# Patient Record
Sex: FEMALE | Race: WHITE | Employment: OTHER | URBAN - METROPOLITAN AREA
[De-identification: names, ages, dates, MRNs, and addresses within clinical notes are randomized per-mention and may not be internally consistent; named-entity substitution may affect disease eponyms.]

---

## 2017-02-28 ENCOUNTER — HOSPITAL ENCOUNTER (OUTPATIENT)
Dept: MAMMOGRAPHY | Age: 67
Discharge: HOME | End: 2017-02-28
Attending: INTERNAL MEDICINE
Payer: MEDICARE

## 2017-02-28 DIAGNOSIS — Z78.0 POSTMENOPAUSAL: ICD-10-CM

## 2017-02-28 PROCEDURE — 77080 DXA BONE DENSITY AXIAL: CPT

## 2017-03-28 ENCOUNTER — HOSPITAL ENCOUNTER (OUTPATIENT)
Dept: PHYSICAL THERAPY | Age: 67
Discharge: HOME | End: 2017-03-28
Attending: INTERNAL MEDICINE
Payer: MEDICARE

## 2017-03-28 DIAGNOSIS — M54.41 ACUTE RIGHT-SIDED LOW BACK PAIN WITH RIGHT-SIDED SCIATICA: ICD-10-CM

## 2017-03-28 PROCEDURE — 97161 PT EVAL LOW COMPLEX 20 MIN: CPT

## 2017-03-28 PROCEDURE — 97110 THERAPEUTIC EXERCISES: CPT

## 2017-03-28 PROCEDURE — G8978 MOBILITY CURRENT STATUS: HCPCS

## 2017-03-28 PROCEDURE — G8979 MOBILITY GOAL STATUS: HCPCS

## 2017-03-28 NOTE — PROGRESS NOTES
Ambulatory/Rehab Services H2 Model Falls Risk Assessment    Risk Factor Pts. ·   Confusion/Disorientation/Impulsivity  []    4 ·   Symptomatic Depression  []   2 ·   Altered Elimination  []   1 ·   Dizziness/Vertigo  []   1 ·   Gender (Male)  []   1 ·   Any administered antiepileptics (anticonvulsants):  []   2 ·   Any administered benzodiazepines:  []   1 ·   Visual Impairment (specify):  []   1 ·   Portable Oxygen Use  []   1 ·   Orthostatic ? BP  []   1 ·   History of Recent Falls (within 3 mos.)  []   5     Ability to Rise from Chair (choose one) Pts. ·   Ability to rise in a single movement  []   0 ·   Pushes up, successful in one attempt  []   1 ·   Multiple attempts, but successful  []   3 ·   Unable to rise without assistance  []   4   Total: (5 or greater = High Risk) 0     Falls Prevention Plan:   []                Physical Limitations to Exercise (specify):   []                Mobility Assistance Device (type):   []                Exercise/Equipment Adaptation (specify):    ©2010 [facility] of [address] Patent #[id number].  Federal Law prohibits the replication, distribution or use without written permission from [facility] [employer]
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
## 2017-03-30 NOTE — PROGRESS NOTES
Received message to call Caltoshia Rolls her at 1:30pm.  She was feeling great after PT session and she believes she may have overdone it. She carried laundry up and down the stairs and woke up this morning and went to the bathroom very early and almost fell. She was able to hold onto the door frame to avoid a fall. I do see Kev Ta tomorrow and told her she is okay to continue the stretching (2) exercises (as long as no increase in pain). Planning on seeing her tomorrow for her appointment.

## 2017-03-31 ENCOUNTER — HOSPITAL ENCOUNTER (OUTPATIENT)
Dept: PHYSICAL THERAPY | Age: 67
Discharge: HOME | End: 2017-03-31
Attending: INTERNAL MEDICINE
Payer: MEDICARE

## 2017-03-31 PROCEDURE — 97140 MANUAL THERAPY 1/> REGIONS: CPT

## 2017-03-31 PROCEDURE — 97110 THERAPEUTIC EXERCISES: CPT
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
## 2017-04-03 ENCOUNTER — APPOINTMENT (OUTPATIENT)
Dept: PHYSICAL THERAPY | Age: 67
End: 2017-04-03
Attending: INTERNAL MEDICINE
Payer: MEDICARE

## 2017-04-04 ENCOUNTER — HOSPITAL ENCOUNTER (OUTPATIENT)
Dept: PHYSICAL THERAPY | Age: 67
Discharge: HOME | End: 2017-04-04
Attending: INTERNAL MEDICINE
Payer: MEDICARE

## 2017-04-06 ENCOUNTER — APPOINTMENT (OUTPATIENT)
Dept: PHYSICAL THERAPY | Age: 67
End: 2017-04-06
Attending: INTERNAL MEDICINE
Payer: MEDICARE

## 2017-04-07 ENCOUNTER — HOSPITAL ENCOUNTER (OUTPATIENT)
Dept: PHYSICAL THERAPY | Age: 67
Discharge: HOME | End: 2017-04-07
Attending: INTERNAL MEDICINE
Payer: MEDICARE

## 2017-04-07 PROCEDURE — 97140 MANUAL THERAPY 1/> REGIONS: CPT
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
## 2017-04-11 ENCOUNTER — HOSPITAL ENCOUNTER (OUTPATIENT)
Dept: PHYSICAL THERAPY | Age: 67
Discharge: HOME | End: 2017-04-11
Attending: INTERNAL MEDICINE
Payer: MEDICARE

## 2017-04-11 PROCEDURE — 97110 THERAPEUTIC EXERCISES: CPT

## 2017-04-11 PROCEDURE — 97140 MANUAL THERAPY 1/> REGIONS: CPT
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
## 2017-04-13 ENCOUNTER — HOSPITAL ENCOUNTER (OUTPATIENT)
Dept: PHYSICAL THERAPY | Age: 67
Discharge: HOME | End: 2017-04-13
Attending: INTERNAL MEDICINE
Payer: MEDICARE
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
## 2017-05-05 ENCOUNTER — HOSPITAL ENCOUNTER (OUTPATIENT)
Age: 67
Setting detail: OUTPATIENT SURGERY
Discharge: HOME | End: 2017-05-05
Attending: SURGERY | Admitting: SURGERY
Payer: MEDICARE

## 2017-05-05 ENCOUNTER — ANESTHESIA (OUTPATIENT)
Dept: ENDOSCOPY | Age: 67
End: 2017-05-05
Payer: MEDICARE

## 2017-05-05 ENCOUNTER — ANESTHESIA EVENT (OUTPATIENT)
Dept: ENDOSCOPY | Age: 67
End: 2017-05-05
Payer: MEDICARE

## 2017-05-05 VITALS
RESPIRATION RATE: 12 BRPM | SYSTOLIC BLOOD PRESSURE: 145 MMHG | TEMPERATURE: 98.6 F | DIASTOLIC BLOOD PRESSURE: 78 MMHG | OXYGEN SATURATION: 98 % | WEIGHT: 118 LBS | HEIGHT: 62 IN | BODY MASS INDEX: 21.71 KG/M2 | HEART RATE: 87 BPM

## 2017-05-05 LAB
GLUCOSE BLD STRIP.AUTO-MCNC: 229 MG/DL (ref 65–100)
GLUCOSE BLD STRIP.AUTO-MCNC: 275 MG/DL (ref 65–100)
GLUCOSE BLD STRIP.AUTO-MCNC: 313 MG/DL (ref 65–100)

## 2017-05-05 PROCEDURE — 82962 GLUCOSE BLOOD TEST: CPT

## 2017-05-05 PROCEDURE — 76060000032 HC ANESTHESIA 0.5 TO 1 HR: Performed by: SURGERY

## 2017-05-05 PROCEDURE — 74011250636 HC RX REV CODE- 250/636: Performed by: ANESTHESIOLOGY

## 2017-05-05 PROCEDURE — 74011250636 HC RX REV CODE- 250/636

## 2017-05-05 PROCEDURE — 76040000025: Performed by: SURGERY

## 2017-05-05 PROCEDURE — 74011636637 HC RX REV CODE- 636/637: Performed by: ANESTHESIOLOGY

## 2017-05-05 PROCEDURE — 74011000250 HC RX REV CODE- 250

## 2017-05-05 RX ORDER — PROPOFOL 10 MG/ML
INJECTION, EMULSION INTRAVENOUS
Status: DISCONTINUED | OUTPATIENT
Start: 2017-05-05 | End: 2017-05-05 | Stop reason: HOSPADM

## 2017-05-05 RX ORDER — SODIUM CHLORIDE, SODIUM LACTATE, POTASSIUM CHLORIDE, CALCIUM CHLORIDE 600; 310; 30; 20 MG/100ML; MG/100ML; MG/100ML; MG/100ML
100 INJECTION, SOLUTION INTRAVENOUS CONTINUOUS
Status: CANCELLED | OUTPATIENT
Start: 2017-05-05

## 2017-05-05 RX ORDER — SODIUM CHLORIDE 0.9 % (FLUSH) 0.9 %
5-10 SYRINGE (ML) INJECTION AS NEEDED
Status: CANCELLED | OUTPATIENT
Start: 2017-05-05

## 2017-05-05 RX ORDER — SODIUM CHLORIDE, SODIUM LACTATE, POTASSIUM CHLORIDE, CALCIUM CHLORIDE 600; 310; 30; 20 MG/100ML; MG/100ML; MG/100ML; MG/100ML
100 INJECTION, SOLUTION INTRAVENOUS CONTINUOUS
Status: DISCONTINUED | OUTPATIENT
Start: 2017-05-05 | End: 2017-05-05 | Stop reason: HOSPADM

## 2017-05-05 RX ORDER — LIDOCAINE HYDROCHLORIDE 20 MG/ML
INJECTION, SOLUTION EPIDURAL; INFILTRATION; INTRACAUDAL; PERINEURAL AS NEEDED
Status: DISCONTINUED | OUTPATIENT
Start: 2017-05-05 | End: 2017-05-05 | Stop reason: HOSPADM

## 2017-05-05 RX ORDER — INSULIN LISPRO 100 [IU]/ML
8 INJECTION, SOLUTION INTRAVENOUS; SUBCUTANEOUS ONCE
Status: COMPLETED | OUTPATIENT
Start: 2017-05-05 | End: 2017-05-05

## 2017-05-05 RX ADMIN — LIDOCAINE HYDROCHLORIDE 60 MG: 20 INJECTION, SOLUTION EPIDURAL; INFILTRATION; INTRACAUDAL; PERINEURAL at 11:06

## 2017-05-05 RX ADMIN — SODIUM CHLORIDE, SODIUM LACTATE, POTASSIUM CHLORIDE, AND CALCIUM CHLORIDE 100 ML/HR: 600; 310; 30; 20 INJECTION, SOLUTION INTRAVENOUS at 10:16

## 2017-05-05 RX ADMIN — PROPOFOL 160 MCG/KG/MIN: 10 INJECTION, EMULSION INTRAVENOUS at 11:06

## 2017-05-05 RX ADMIN — INSULIN LISPRO 8 UNITS: 100 INJECTION, SOLUTION INTRAVENOUS; SUBCUTANEOUS at 10:28

## 2017-05-05 NOTE — ANESTHESIA PREPROCEDURE EVALUATION
Anesthetic History               Review of Systems / Medical History  Patient summary reviewed, nursing notes reviewed and pertinent labs reviewed    Pulmonary        Sleep apnea: No treatment           Neuro/Psych              Cardiovascular    Hypertension              Exercise tolerance: >4 METS     GI/Hepatic/Renal                Endo/Other    Diabetes: poorly controlled, type 2  Hypothyroidism: well controlled       Other Findings   Comments: Sjogren's syndrome         Physical Exam    Airway  Mallampati: II  TM Distance: 4 - 6 cm  Neck ROM: decreased range of motion        Cardiovascular  Regular rate and rhythm,  S1 and S2 normal,  no murmur, click, rub, or gallop             Dental  No notable dental hx       Pulmonary  Breath sounds clear to auscultation               Abdominal  GI exam deferred       Other Findings            Anesthetic Plan    ASA: 3  Anesthesia type: total IV anesthesia            Anesthetic plan and risks discussed with: Patient and Spouse

## 2017-05-05 NOTE — ANESTHESIA POSTPROCEDURE EVALUATION
Post-Anesthesia Evaluation and Assessment    Patient: Ai Mckee MRN: [id number]  SSN: [id number]    YOB: 1950  Age: 78 y.o. Sex: female       Cardiovascular Function/Vital Signs  Visit Vitals    /83    Pulse 87    Temp 37 °C (98.6 °F)    Resp 12    Ht 5' 2\" (1.575 m)    Wt 53.5 kg (118 lb)    SpO2 98%    BMI 21.58 kg/m2       Patient is status post total IV anesthesia anesthesia for Procedure(s):  COLONOSCOPY/ 23.    Nausea/Vomiting: None    Postoperative hydration reviewed and adequate. Pain:  Pain Scale 1: Numeric (0 - 10) (05/05/17 1222)  Pain Intensity 1: 0 (05/05/17 1222)   Managed    Neurological Status: At baseline    Mental Status and Level of Consciousness: Arousable    Pulmonary Status:   O2 Device: Room air (05/05/17 1222)   Adequate oxygenation and airway patent    Complications related to anesthesia: None    Post-anesthesia assessment completed.  No concerns    Signed By: Roverto Cortez MD     May 5, 2017

## 2017-05-05 NOTE — IP AVS SNAPSHOT
Jeff Lopez 
 
 
 [address] [facility] [address] [facility] 
[phone] Patient: Darren Wallace MRN: [id number] LTR:9/58/3901 You are allergic to the following Allergen Reactions Clindamycin Anaphylaxis Amoxicillin Vertigo Cortisone Other (comments) Methylcellulose Other (comments) Vit E Acet-Min Oil-Dimethicone Other (comments) Pt reports no longer has issues with Araceli E. Recent Documentation Height Weight BMI OB Status Smoking Status 1.575 m 53.5 kg 21.58 kg/m2 Postmenopausal Never Smoker Emergency Contacts Name Discharge Info Relation Home Work Mobile Anamikakya Larry DISCHARGE CAREGIVER [3] Spouse [3] [phone] About your hospitalization You were admitted on: May 5, 2017 You last received care in the:  [facility] You were discharged on: May 5, 2017 Unit phone number:  [phone] Why you were hospitalized Your primary diagnosis was:  Not on File Providers Seen During Your Hospitalizations Provider Role Specialty Primary office phone Roxana Soto MD Attending Provider General Surgery [phone] Your Primary Care Physician (PCP) Primary Care Physician Office Phone Office Fax Abdoulaye Tim [phone] Follow-up Information None Your Appointments Wednesday May 17, 2017  3:15 PM EDT  
[facility] SCREENING with [facility] ROOM [address] [place] [address] [facility] ([address] [place] [address] Avenue) [address] [place] [address]  
[phone] ***** NOTE: Appointments for the Mobile Mammography UNIT CANNOT be made on My Chart *****  PATIENT ARRIVAL - Please report 30 minutes early to check in. GENERAL INSTRUCTIONS -  On the day of your exam do not use any bath powder, deodorant or lotions on the chest or armpit area.   Wear two-piece outfit for ease of changing. Allow at least 1 hour for test.  
  
    
  
  
 
  
  
  
Current Discharge Medication List  
  
ASK your doctor about these medications Dose & Instructions Dispensing Information Comments Morning Noon Evening Bedtime ACCU-CHEK LEANN PLUS TEST STRP strip Generic drug:  glucose blood VI test strips Your last dose was: Your next dose is:    
   
   
 Pt test 1 time daily DX: E11.9 Quantity:  100 Strip Refills:  3  
     
   
   
   
  
 acetaminophen 650 mg CR tablet Commonly known as:  TYLENOL Your last dose was: Your next dose is:    
   
   
 Dose:  650 mg Take 650 mg by mouth every six (6) hours as needed for Pain. Refills:  0 AMLODIPINE PO Your last dose was: Your next dose is: Take  by mouth. Refills:  0  
     
   
   
   
  
 ibuprofen 800 mg tablet Commonly known as:  MOTRIN Your last dose was: Your next dose is:    
   
   
 Dose:  800 mg Take 800 mg by mouth. Holding for colonoscopy Refills:  0  
     
   
   
   
  
 levothyroxine 100 mcg tablet Commonly known as:  SYNTHROID Your last dose was: Your next dose is:    
   
   
 Dose:  100 mcg Take 1 Tab by mouth Daily (before breakfast). Quantity:  90 Tab Refills:  1  
     
   
   
   
  
 metFORMIN 500 mg tablet Commonly known as:  GLUCOPHAGE Your last dose was: Your next dose is: TAKE 1 TABLET TWICE A DAY WITH MEALS Quantity:  180 Tab Refills:  1 OTHER Your last dose was: Your next dose is: Take  by mouth daily. Herbal supplement that she use to replace amlodipine  Indications: Bell Blood Pressure # 26 Refills:  0  
     
   
   
   
  
 promethazine 25 mg tablet Commonly known as:  PHENERGAN Your last dose was:     
   
Your next dose is:    
   
   
 Dose:  25 mg  
 Take 1 Tab by mouth every six (6) hours as needed for Nausea. Quantity:  30 Tab Refills:  2 tiZANidine 4 mg tablet Commonly known as:  Geovanny Tomas Your last dose was: Your next dose is:    
   
   
 Dose:  4 mg Take 1 Tab by mouth three (3) times daily. Quantity:  90 Tab Refills:  1 Discharge Instructions Gastrointestinal Colonoscopy/Flexible Sigmoidoscopy - Lower Exam Discharge Instructions 1. Call Dr. Siomara Sanon for any problems or questions. 2. Contact the doctors office for follow up appointment as directed 3. Medication may cause drowsiness for several hours, therefore, do not drive or operate machinery for remainder of the day. 4. No alcohol today. 5. Ordinarily, you may resume regular diet and activity after exam unless otherwise specified by your physician. 6. Because of air put into your colon during exam, you may experience some abdominal distension, relieved by the passage of gas, for several hours. 7. Contact your physician if you have any of the following: 
a. Excessive amount of bleeding  large amount when having a bowel movement. Occasional specks of blood with bowel movement would not be unusual. 
b. Severe abdominal pain 
c. Fever or Chills Any additional instructions:   
 
Repeat colonoscopy in 5 years Instructions given to Meera Hernandez and other family members. Discharge Orders None ACO Transitions of Care Introducing [employer] offers a voluntary care coordination program to provide high quality service and care to [facility] fee-for-service beneficiaries. Ara Gallegos was designed to help you enhance your health and well-being through the following services: ? Transitions of Care  support for individuals who are transitioning from one care setting to another (example: Hospital to home). ? Chronic and Complex Care Coordination  support for individuals and caregivers of those with serious or chronic illnesses or with more than one chronic (ongoing) condition and those who take a number of different medications. If you meet specific medical criteria, a [address] [place] [address] Rd may call you directly to coordinate your care with your primary care physician and your other care providers. For questions about the [facility] programs, please, contact your physicians office. For general questions or additional information about Accountable Care Organizations: 
Please visit www.medicare.gov/acos. html or call 1-800-MEDICARE (2-680.515.7243) TTY users should call 4-553.541.2591. Introducing \[facility] [place] [facility]\"" & HEALTH SERVICES! Dear Nataliia Mahoney: Thank you for requesting a [employer] account. Our records indicate that you already have an active [employer] account. You can access your account anytime at [email or web address] Did you know that you can access your hospital and ER discharge instructions at any time in [employer]? You can also review all of your test results from your hospital stay or ER visit. Additional Information If you have questions, please visit the Frequently Asked Questions section of the [employer] website at [email or web address] Remember, [employer] is NOT to be used for urgent needs. For medical emergencies, dial 911. Now available from your iPhone and Android! General Information Please provide this summary of care documentation to your next provider. Patient Signature:  ____________________________________________________________ Date:  ____________________________________________________________  
  
Maximus Hernández Provider Signature:  ____________________________________________________________ Date:  ____________________________________________________________

## 2017-05-05 NOTE — H&P
Colonoscopy History and Physical      Patient: Meaghan Abdullahi    Physician: Shakira Estrada MD    Referring Physician: No ref. provider found    Chief Complaint: For colonoscopy    History of Present Illness: Pt presents for colonoscopy. Family history of colon cancer in father, PGF. Has had prior exam in 2005, with 2 polyps, and (-) exam in 2011. History:  Past Medical History:   Diagnosis Date    Adverse effect of anesthesia     patient states she is slower to wake up than other and feels groggy longer    Diabetes ([address]     avfbs 225, last HA1C was 11.0, patient prefers to treat with herbal supplements    Hypertension     Sjogren's syndrome ([address]     dry eye and fatigue are her symptoms    Sleep apnea     patient does not use a c-pap, patient states she has eliminated this with meditation    Thyroid disease     hypothyroidism     Past Surgical History:   Procedure Laterality Date    HX LAP CHOLECYSTECTOMY  11/2007    HX TUBAL LIGATION  1976      Social History     Social History    Marital status:      Spouse name: N/A    Number of children: N/A    Years of education: N/A     Social History Main Topics    Smoking status: Never Smoker    Smokeless tobacco: Never Used    Alcohol use No    Drug use: No    Sexual activity: Yes     Partners: Male     Other Topics Concern    None     Social History Narrative      Family History   Problem Relation Age of Onset    Heart Disease Father     Stroke Father     Cancer Father      [place]    Heart Disease Brother     Diabetes Brother      DMII    Cancer Paternal Grandfather      [place]       Medications:   Prior to Admission medications    Medication Sig Start Date End Date Taking? Authorizing Provider   AMLODIPINE BESYLATE (AMLODIPINE PO) Take  by mouth. Yes Historical Provider   acetaminophen (TYLENOL) 650 mg CR tablet Take 650 mg by mouth every six (6) hours as needed for Pain.    Yes Historical Provider   promethazine (PHENERGAN) 25 mg tablet Take 1 Tab by mouth every six (6) hours as needed for Nausea. 3/11/16  Yes Vasyl Urban MD   metFORMIN (GLUCOPHAGE) 500 mg tablet TAKE 1 TABLET TWICE A DAY WITH MEALS  Patient taking differently: TAKE 1 TABLET TWICE A DAY WITH MEALS patient takes spiratically due to feet swelling 3/2/16  Yes Vasyl Urban MD   ibuprofen (MOTRIN) 800 mg tablet Take 800 mg by mouth. Holding for colonoscopy    Historical Provider   tiZANidine (ZANAFLEX) 4 mg tablet Take 1 Tab by mouth three (3) times daily. 4/5/17   Brandan Killian MD   levothyroxine (SYNTHROID) 100 mcg tablet Take 1 Tab by mouth Daily (before breakfast). 3/21/17   Brandan Killian MD   OTHER Take  by mouth daily. Herbal supplement that she use to replace amlodipine  Indications: Bell Blood Pressure # 26    Historical Provider   ACCU-CHEK LEANN PLUS TEST STRP strip Pt test 1 time daily DX: E11.9 3/11/16   Vasyl Urban MD       Allergies: Allergies   Allergen Reactions    Clindamycin Anaphylaxis    Amoxicillin Vertigo    Cortisone Other (comments)    Methylcellulose Other (comments)    Vit E Acet-Min Oil-Dimethicone Other (comments)     Pt reports no longer has issues with Araceli E. Physical Exam:     Vital Signs:   Visit Vitals    BP (!) 208/100    Pulse (!) 102    Temp 99.2 °F (37.3 °C)    Resp 16    Ht 5' 2\" (1.575 m)    Wt 118 lb (53.5 kg)    SpO2 96%    BMI 21.58 kg/m2     . General: in NAD      Heart: regular   Lungs: unlabored   Abdominal: soft   Neurological: grossly normal        Findings/Diagnosis: family history of colon cancer, personal hisstory of polyps    Plan of Care/Planned Procedure: Colonoscopy. Risks of endoscopy include  bleeding, perforation. They understand and agree to proceed.       Signed:  Martha Combs MD   5/5/2017

## 2017-05-05 NOTE — PROCEDURES
Procedure in Detail:  Informed consent was obtained for the procedure. The patient was placed in the left lateral decubitus position and sedation was induced by anesthesia. The [id number] was inserted into the rectum and advanced under direct vision to the cecum, which was identified by the ileocecal valve and appendiceal orifice. The quality of the colonic preparation was good. A careful inspection was made as the colonoscope was withdrawn, including a retroflexed view of the rectum; findings and interventions are described below. Appropriate photodocumentation was obtained. Findings:   ANUS: Anal exam reveals no masses or hemorrhoids, sphincter tone is normal.   RECTUM: Rectal exam reveals no masses or hemorrhoids. SIGMOID COLON: The mucosa is normal with good vascular pattern and without ulcers, diverticula, and polyps. DESCENDING COLON: The mucosa is normal with good vascular pattern and without ulcers, diverticula, and polyps. SPLENIC FLEXURE: The splenic flexure is normal.   TRANSVERSE COLON: The mucosa is normal with good vascular pattern and without ulcers, diverticula, and polyps. HEPATIC FLEXURE: The hepatic flexure is normal.   ASCENDING COLON: The mucosa is normal with good vascular pattern and without ulcers, diverticula, and polyps. CECUM: The appendiceal orifice appears normal. The ileocecal valve appears normal.   TERMINAL ILEUM: The terminal ileum was not entered. Specimens: No specimens were collected. Complications: None; patient tolerated the procedure well. \    EBL - none    Recommendations:   - Repeat colonoscopy in 5 years.      Signed By: Donn Tejada MD                        May 5, 2017

## 2017-05-05 NOTE — ROUTINE PROCESS
Vss. No complaints noted. Education given and reviewed with  who voiced understanding. Pt discharged via wheelchair by Nasrin Godfrey RN.

## 2017-05-05 NOTE — DISCHARGE INSTRUCTIONS
Gastrointestinal Colonoscopy/Flexible Sigmoidoscopy - Lower Exam Discharge Instructions  1. Call Dr. Job Brittle for any problems or questions. 2. Contact the doctors office for follow up appointment as directed  3. Medication may cause drowsiness for several hours, therefore, do not drive or operate machinery for remainder of the day. 4. No alcohol today. 5. Ordinarily, you may resume regular diet and activity after exam unless otherwise specified by your physician. 6. Because of air put into your colon during exam, you may experience some abdominal distension, relieved by the passage of gas, for several hours. 7. Contact your physician if you have any of the following:  a. Excessive amount of bleeding - large amount when having a bowel movement. Occasional specks of blood with bowel movement would not be unusual.  b. Severe abdominal pain  c. Fever or Chills  Any additional instructions:      Repeat colonoscopy in 5 years      Instructions given to Onelia Silva and other family members.

## 2019-08-17 ENCOUNTER — HOSPITAL ENCOUNTER (OUTPATIENT)
Dept: MAMMOGRAPHY | Age: 69
Discharge: HOME | End: 2019-08-17
Attending: FAMILY MEDICINE
Payer: MEDICARE

## 2019-08-17 DIAGNOSIS — Z12.31 ENCOUNTER FOR SCREENING MAMMOGRAM FOR BREAST CANCER: ICD-10-CM

## 2019-08-17 PROCEDURE — 77067 SCR MAMMO BI INCL CAD: CPT

## 2019-08-27 NOTE — PROGRESS NOTES
Your mammogram was normal.  You have dense breasts. This increases your risk of breast cancer. Recommend monthly self breast exams, yearly clinical breast exams, and yearly mammograms.   Cherise Arenas MD

## 2019-11-19 PROBLEM — H25.13 AGE-RELATED NUCLEAR CATARACT OF BOTH EYES: Status: ACTIVE | Noted: 2019-06-18

## 2019-11-19 PROBLEM — F32.9 MAJOR DEPRESSIVE DISORDER WITH SINGLE EPISODE: Status: ACTIVE | Noted: 2019-11-19

## 2019-11-19 PROBLEM — H04.123 DRY EYE SYNDROME, BILATERAL: Status: ACTIVE | Noted: 2019-06-18

## 2020-03-11 PROBLEM — F41.9 ANXIETY: Status: ACTIVE | Noted: 2020-03-11

## 2021-02-18 PROBLEM — Z78.9 STATIN INTOLERANCE: Status: ACTIVE | Noted: 2021-02-18

## 2021-02-18 PROBLEM — E78.5 HYPERLIPIDEMIA ASSOCIATED WITH TYPE 2 DIABETES MELLITUS (HCC): Status: ACTIVE | Noted: 2021-02-18

## 2021-02-18 PROBLEM — E11.69 HYPERLIPIDEMIA ASSOCIATED WITH TYPE 2 DIABETES MELLITUS (HCC): Status: ACTIVE | Noted: 2021-02-18

## 2021-10-30 ENCOUNTER — HOSPITAL ENCOUNTER (INPATIENT)
Age: 71
LOS: 3 days | Discharge: HOME | DRG: 243 | End: 2021-11-02
Attending: INTERNAL MEDICINE | Admitting: INTERNAL MEDICINE
Payer: MEDICARE

## 2021-10-30 ENCOUNTER — APPOINTMENT (OUTPATIENT)
Dept: GENERAL RADIOLOGY | Age: 71
DRG: 243 | End: 2021-10-30
Attending: NURSE PRACTITIONER
Payer: MEDICARE

## 2021-10-30 DIAGNOSIS — Z79.4 DIABETES MELLITUS DUE TO UNDERLYING CONDITION WITH HYPEROSMOLARITY WITHOUT COMA, WITH LONG-TERM CURRENT USE OF INSULIN (HCC): ICD-10-CM

## 2021-10-30 DIAGNOSIS — E08.00 DIABETES MELLITUS DUE TO UNDERLYING CONDITION WITH HYPEROSMOLARITY WITHOUT COMA, WITH LONG-TERM CURRENT USE OF INSULIN (HCC): ICD-10-CM

## 2021-10-30 DIAGNOSIS — H25.13 AGE-RELATED NUCLEAR CATARACT OF BOTH EYES: ICD-10-CM

## 2021-10-30 DIAGNOSIS — E78.5 HYPERLIPIDEMIA ASSOCIATED WITH TYPE 2 DIABETES MELLITUS (HCC): ICD-10-CM

## 2021-10-30 DIAGNOSIS — I10 PRIMARY HYPERTENSION: ICD-10-CM

## 2021-10-30 DIAGNOSIS — R00.1 BRADYCARDIA: ICD-10-CM

## 2021-10-30 DIAGNOSIS — R00.1 SYMPTOMATIC BRADYCARDIA: ICD-10-CM

## 2021-10-30 DIAGNOSIS — H04.123 DRY EYE SYNDROME, BILATERAL: ICD-10-CM

## 2021-10-30 DIAGNOSIS — F41.9 ANXIETY: ICD-10-CM

## 2021-10-30 DIAGNOSIS — E11.69 HYPERLIPIDEMIA ASSOCIATED WITH TYPE 2 DIABETES MELLITUS (HCC): ICD-10-CM

## 2021-10-30 LAB
ANION GAP SERPL CALC-SCNC: 10 MMOL/L (ref 7–16)
BUN SERPL-MCNC: 13 MG/DL (ref 8–23)
CALCIUM SERPL-MCNC: 8.1 MG/DL (ref 8.3–10.4)
CHLORIDE SERPL-SCNC: 105 MMOL/L (ref 98–107)
CO2 SERPL-SCNC: 21 MMOL/L (ref 21–32)
CREAT SERPL-MCNC: 0.77 MG/DL (ref 0.6–1)
GLUCOSE BLD STRIP.AUTO-MCNC: 315 MG/DL (ref 65–100)
GLUCOSE SERPL-MCNC: 253 MG/DL (ref 65–100)
MAGNESIUM SERPL-MCNC: 1.7 MG/DL (ref 1.8–2.4)
POTASSIUM SERPL-SCNC: 3.3 MMOL/L (ref 3.5–5.1)
SERVICE CMNT-IMP: ABNORMAL
SODIUM SERPL-SCNC: 136 MMOL/L (ref 136–145)
TSH SERPL DL<=0.005 MIU/L-ACNC: 0.15 UIU/ML (ref 0.36–3.74)

## 2021-10-30 PROCEDURE — 74011250636 HC RX REV CODE- 250/636: Performed by: INTERNAL MEDICINE

## 2021-10-30 PROCEDURE — 99223 1ST HOSP IP/OBS HIGH 75: CPT | Performed by: INTERNAL MEDICINE

## 2021-10-30 PROCEDURE — 36415 COLL VENOUS BLD VENIPUNCTURE: CPT

## 2021-10-30 PROCEDURE — 74011250636 HC RX REV CODE- 250/636: Performed by: NURSE PRACTITIONER

## 2021-10-30 PROCEDURE — 74011636637 HC RX REV CODE- 636/637: Performed by: NURSE PRACTITIONER

## 2021-10-30 PROCEDURE — 77030032994 HC SOL INJ SOD CL 0.9% LFCR 500ML

## 2021-10-30 PROCEDURE — 65620000000 HC RM CCU GENERAL

## 2021-10-30 PROCEDURE — 80048 BASIC METABOLIC PNL TOTAL CA: CPT

## 2021-10-30 PROCEDURE — 82962 GLUCOSE BLOOD TEST: CPT

## 2021-10-30 PROCEDURE — 2709999900 HC NON-CHARGEABLE SUPPLY

## 2021-10-30 PROCEDURE — 74011250637 HC RX REV CODE- 250/637: Performed by: INTERNAL MEDICINE

## 2021-10-30 PROCEDURE — 71045 X-RAY EXAM CHEST 1 VIEW: CPT

## 2021-10-30 PROCEDURE — 84443 ASSAY THYROID STIM HORMONE: CPT

## 2021-10-30 PROCEDURE — 65610000006 HC RM INTENSIVE CARE

## 2021-10-30 PROCEDURE — 74011250637 HC RX REV CODE- 250/637: Performed by: NURSE PRACTITIONER

## 2021-10-30 PROCEDURE — 83735 ASSAY OF MAGNESIUM: CPT

## 2021-10-30 RX ORDER — MAG HYDROX/ALUMINUM HYD/SIMETH 200-200-20
30 SUSPENSION, ORAL (FINAL DOSE FORM) ORAL
Status: DISCONTINUED | OUTPATIENT
Start: 2021-10-30 | End: 2021-11-02 | Stop reason: HOSPADM

## 2021-10-30 RX ORDER — LEVOTHYROXINE AND LIOTHYRONINE 38; 9 UG/1; UG/1
30 TABLET ORAL DAILY
Status: DISCONTINUED | OUTPATIENT
Start: 2021-10-31 | End: 2021-11-02 | Stop reason: HOSPADM

## 2021-10-30 RX ORDER — MONTELUKAST SODIUM 10 MG/1
10 TABLET ORAL
Status: DISCONTINUED | OUTPATIENT
Start: 2021-10-30 | End: 2021-11-02 | Stop reason: HOSPADM

## 2021-10-30 RX ORDER — SODIUM CHLORIDE 0.9 % (FLUSH) 0.9 %
5-40 SYRINGE (ML) INJECTION EVERY 8 HOURS
Status: DISCONTINUED | OUTPATIENT
Start: 2021-10-30 | End: 2021-11-02 | Stop reason: HOSPADM

## 2021-10-30 RX ORDER — LORAZEPAM 2 MG/ML
INJECTION INTRAMUSCULAR
Status: ACTIVE
Start: 2021-10-30 | End: 2021-10-31

## 2021-10-30 RX ORDER — ASCORBIC ACID 500 MG
500 TABLET ORAL DAILY
Status: DISCONTINUED | OUTPATIENT
Start: 2021-10-31 | End: 2021-11-02 | Stop reason: HOSPADM

## 2021-10-30 RX ORDER — HYDRALAZINE HYDROCHLORIDE 20 MG/ML
20 INJECTION INTRAMUSCULAR; INTRAVENOUS
Status: DISCONTINUED | OUTPATIENT
Start: 2021-10-30 | End: 2021-11-02 | Stop reason: HOSPADM

## 2021-10-30 RX ORDER — SODIUM CHLORIDE 0.9 % (FLUSH) 0.9 %
5-40 SYRINGE (ML) INJECTION AS NEEDED
Status: DISCONTINUED | OUTPATIENT
Start: 2021-10-30 | End: 2021-11-02 | Stop reason: HOSPADM

## 2021-10-30 RX ORDER — NALOXONE HYDROCHLORIDE 0.4 MG/ML
0.4 INJECTION, SOLUTION INTRAMUSCULAR; INTRAVENOUS; SUBCUTANEOUS AS NEEDED
Status: DISCONTINUED | OUTPATIENT
Start: 2021-10-30 | End: 2021-11-02 | Stop reason: HOSPADM

## 2021-10-30 RX ORDER — ACETAMINOPHEN 325 MG/1
650 TABLET ORAL
Status: DISCONTINUED | OUTPATIENT
Start: 2021-10-30 | End: 2021-11-02 | Stop reason: HOSPADM

## 2021-10-30 RX ORDER — ONDANSETRON 2 MG/ML
4 INJECTION INTRAMUSCULAR; INTRAVENOUS
Status: DISCONTINUED | OUTPATIENT
Start: 2021-10-30 | End: 2021-11-02 | Stop reason: HOSPADM

## 2021-10-30 RX ORDER — LORAZEPAM 2 MG/ML
0.5 INJECTION INTRAMUSCULAR ONCE
Status: COMPLETED | OUTPATIENT
Start: 2021-10-30 | End: 2021-10-30

## 2021-10-30 RX ORDER — DIPHENHYDRAMINE HCL 25 MG
25 CAPSULE ORAL
Status: DISCONTINUED | OUTPATIENT
Start: 2021-10-30 | End: 2021-11-02 | Stop reason: HOSPADM

## 2021-10-30 RX ORDER — INSULIN LISPRO 100 [IU]/ML
INJECTION, SOLUTION INTRAVENOUS; SUBCUTANEOUS
Status: DISCONTINUED | OUTPATIENT
Start: 2021-10-30 | End: 2021-11-02 | Stop reason: HOSPADM

## 2021-10-30 RX ORDER — LOSARTAN POTASSIUM 50 MG/1
25 TABLET ORAL DAILY
Status: DISCONTINUED | OUTPATIENT
Start: 2021-10-31 | End: 2021-11-01

## 2021-10-30 RX ORDER — MONTELUKAST SODIUM 4 MG/1
1 TABLET, CHEWABLE ORAL 2 TIMES DAILY
Status: DISCONTINUED | OUTPATIENT
Start: 2021-10-31 | End: 2021-11-01

## 2021-10-30 RX ORDER — DOCUSATE SODIUM 100 MG/1
100 CAPSULE, LIQUID FILLED ORAL
Status: DISCONTINUED | OUTPATIENT
Start: 2021-10-30 | End: 2021-11-02 | Stop reason: HOSPADM

## 2021-10-30 RX ORDER — ALOGLIPTIN 25 MG/1
25 TABLET, FILM COATED ORAL DAILY
Status: DISCONTINUED | OUTPATIENT
Start: 2021-10-31 | End: 2021-11-02 | Stop reason: HOSPADM

## 2021-10-30 RX ORDER — FLUTICASONE PROPIONATE 50 MCG
2 SPRAY, SUSPENSION (ML) NASAL DAILY
Status: DISCONTINUED | OUTPATIENT
Start: 2021-10-31 | End: 2021-11-02 | Stop reason: HOSPADM

## 2021-10-30 RX ORDER — EZETIMIBE 10 MG/1
10 TABLET ORAL DAILY
Status: DISCONTINUED | OUTPATIENT
Start: 2021-10-31 | End: 2021-11-02 | Stop reason: HOSPADM

## 2021-10-30 RX ADMIN — ACETAMINOPHEN 650 MG: 325 TABLET ORAL at 19:25

## 2021-10-30 RX ADMIN — Medication 10 ML: at 19:23

## 2021-10-30 RX ADMIN — MONTELUKAST 10 MG: 10 TABLET, FILM COATED ORAL at 19:52

## 2021-10-30 RX ADMIN — APIXABAN 5 MG: 5 TABLET, FILM COATED ORAL at 19:51

## 2021-10-30 RX ADMIN — SODIUM CHLORIDE 500 ML: 900 INJECTION, SOLUTION INTRAVENOUS at 20:01

## 2021-10-30 RX ADMIN — INSULIN LISPRO 8 UNITS: 100 INJECTION, SOLUTION INTRAVENOUS; SUBCUTANEOUS at 19:33

## 2021-10-30 RX ADMIN — Medication 1 EACH: at 20:10

## 2021-10-30 RX ADMIN — HYDRALAZINE HYDROCHLORIDE 20 MG: 20 INJECTION INTRAMUSCULAR; INTRAVENOUS at 19:26

## 2021-10-30 RX ADMIN — LORAZEPAM 0.5 MG: 2 INJECTION INTRAMUSCULAR; INTRAVENOUS at 20:09

## 2021-10-30 NOTE — Clinical Note
Sheath #2: Sheath: inserted. Sheath inserted/placed in the left subclavian  vein. Hemostasis achieved.

## 2021-10-30 NOTE — Clinical Note
TRANSFER - OUT REPORT:     Verbal report given to: CPRU. Report consisted of patient's Situation, Background, Assessment and   Recommendations(SBAR). Opportunity for questions and clarification was provided. Patient transported with a Registered Nurse and [address] [place] [address] / [facility] [employer].

## 2021-10-30 NOTE — Clinical Note
Right ventricle lead sutured in place using other (document in comment) sutures. Sheath pealed away prior to suturing.  Lead sutured down with O Ti-Cron

## 2021-10-30 NOTE — H&P
History of present illness:71 y.o. female presented to [facility] upon transfer from [facility] emergency department. Patient has a previous history of diabetes that is uncontrolled with last A1c greater than 9 additionally history of hypothyroidism on [place] Thyroid therapy. Patient presented to [facility] after a syncopal event earlier today. Patient states she was in her normal state of health lost consciousness awoke on the floor. She was taken to the [facility] emergency department at that time she was noted to be in atrial fibrillation which was a new diagnosis. Additionally heart rates in the 30 to 40 bpm range that were transient in nature and associated with emesis and brief loss of consciousness. At the time of exam today patient is awake and oriented with no complaints heart rate 90 bpm in atrial fibrillation. The patient is asymptomatic. Assessment  Atrial fibrillation new diagnosis patient with [id number] Score for Atrial Fibrillation Stroke Risk 4. Due to age and female gender history of hypertension and diabetes. Anticoagulation will be initiated with Eliquis dosing. See below regarding plans for device-based therapy  Rate control therapies will be indicated in the future now held due to symptomatic severe bradycardia associated with syncope      Bradycardia patient with episodes of symptomatic bradycardia off rate control medications suggesting underlying conduction disease. This was associated with a syncopal event and rates in the 30 to 40 bpm range. Rhythm strips reviewed. Tentative plan for dual-chamber pacemaker placement following echocardiogram to ensure patient does not have underlying left ventricular systolic dysfunction    Hypertension  Uncontrolled  Resume home therapy in the future. For now patient will be treated with IV hydralazine due to transiently elevated blood pressure readings.       Diabetes uncontrolled  Plan to initiate sliding scale insulin  Patient likely candidate for SGL 2 and GLP-1 therapy in the future    Hypothyroidism  Checking TSH T4 due to episodes of bradycardia      Review of Systems   Constitutional: Negative for fever. HENT: Negative for hearing loss. Respiratory: Negative for cough. Cardiovascular: Negative for chest pain. Gastrointestinal: Negative for heartburn. Genitourinary: Negative for dysuria. Musculoskeletal: Negative for myalgias. Skin: Negative for rash. Endo/Heme/Allergies: Does not bruise/bleed easily. Psychiatric/Behavioral: Negative for depression.      Past Medical History:   Diagnosis Date    Adverse effect of anesthesia     patient states she is slower to wake up than other and feels groggy longer    Anxiety     Depression     Diabetes ([address]     Hypercholesterolemia     Hypertension     IBS (irritable bowel syndrome)     Lumbar disc herniation     Sjogren's syndrome (HCC)     dry eye and fatigue are her symptoms    Sleep apnea     patient does not use a c-pap    TBI [facility]) [facility]     Thyroid disease     hypothyroidism     Past Surgical History:   Procedure Laterality Date    COLONOSCOPY N/A 5/5/2017    COLONOSCOPY/ 23 performed by Venice Amezquita MD at [facility] ENDOSCOPY    HX LAP CHOLECYSTECTOMY  11/2007    HX ORTHOPAEDIC      '18 laser treaatment lumber disc herniation    HX RETINAL DETACHMENT REPAIR Right 09/2021    HX TUBAL LIGATION  1976     Family History   Problem Relation Age of Onset    Heart Disease Father     Stroke Father     Cancer Father         Elma    Heart Disease Brother     Diabetes Brother         DMII    Cancer Paternal Grandfather         Elma    Cancer Paternal Aunt         ovarian    Breast Cancer Neg Hx      Social History     Socioeconomic History    Marital status:      Spouse name: Not on file    Number of children: Not on file    Years of education: Not on file    Highest education level: Not on file   Occupational History    Not on file   Tobacco Use    Smoking status: Never Smoker    Smokeless tobacco: Never Used   Vaping Use    Vaping Use: Never used   Substance and Sexual Activity    Alcohol use: No    Drug use: No    Sexual activity: Yes     Partners: Male   Other Topics Concern    Not on file   Social History Narrative    Not on file     Social Determinants of Health     Financial Resource Strain:     Difficulty of Paying Living Expenses:    Food Insecurity:     Worried About Running Out of Food in the Last Year:     [address] in the Last Year:    Transportation Needs:     Lack of Transportation (Medical):  Lack of Transportation (Non-Medical):    Physical Activity:     Days of Exercise per Week:     Minutes of Exercise per Session:    Stress:     Feeling of Stress :    Social Connections:     Frequency of Communication with Friends and Family:     Frequency of Social Gatherings with Friends and Family:     Attends Latter-day Services:     Active Member of Clubs or Organizations:     Attends Club or Organization Meetings:     Marital Status:    Intimate Partner Violence:     Fear of Current or Ex-Partner:     Emotionally Abused:     Physically Abused:     Sexually Abused:      No current facility-administered medications for this encounter. Visit Vitals  BP (!) 199/100   Pulse 98   Temp 98.9 °F (37.2 °C)   Resp 28   SpO2 97%     Physical Exam  Constitutional:       Appearance: She is well-developed. She is not diaphoretic. HENT:      Head: Normocephalic and atraumatic. Mouth/Throat:      Pharynx: No oropharyngeal exudate. Eyes:      General: No scleral icterus. Right eye: No discharge. Left eye: No discharge. Conjunctiva/sclera: Conjunctivae normal.      Pupils: Pupils are equal, round, and reactive to light. Neck:      Vascular: No JVD. Trachea: No tracheal deviation. Cardiovascular:      Rate and Rhythm: Normal rate. Heart sounds: Normal heart sounds. No murmur heard. Pulmonary:      Effort: Pulmonary effort is normal.      Breath sounds: No wheezing. Abdominal:      General: There is no distension. Palpations: There is no mass. Tenderness: There is no abdominal tenderness. There is no guarding or rebound. Musculoskeletal:         General: No tenderness. Normal range of motion. Cervical back: Normal range of motion. Skin:     General: Skin is warm and dry. Coloration: Skin is not pale. Findings: No rash. Neurological:      Mental Status: She is alert and oriented to person, place, and time. Cranial Nerves: No cranial nerve deficit. Psychiatric:         Behavior: Behavior normal.         Thought Content: Thought content normal.       No intake or output data in the 24 hours ending 10/30/21 1907  Lab Results   Component Value Date/Time    Sodium 139 10/18/2021 11:27 AM    Potassium 4.3 10/18/2021 11:27 AM    Chloride 100 10/18/2021 11:27 AM    CO2 24 10/18/2021 11:27 AM    Glucose 237 (H) 10/18/2021 11:27 AM    BUN 11 10/18/2021 11:27 AM    Creatinine 0.72 10/18/2021 11:27 AM    BUN/Creatinine ratio 15 10/18/2021 11:27 AM    GFR est AA 97 10/18/2021 11:27 AM    GFR est non-AA 84 10/18/2021 11:27 AM    Calcium 9.6 10/18/2021 11:27 AM     Lab Results   Component Value Date/Time    WBC 6.3 10/18/2021 11:27 AM    HGB 14.0 10/18/2021 11:27 AM    HCT 44.1 10/18/2021 11:27 AM    PLATELET 850 76/10/0784 11:27 AM    MCV 88 10/18/2021 11:27 AM     No results found for: CPK, RCK1, RCK2, RCK3, RCK4, CKMB, CKNDX, CKND1, TROPT, TROIQ, BNPP, BNP  Lab Results   Component Value Date/Time    Hemoglobin A1c 9.8 (H) 10/18/2021 11:27 AM         Assessment:    ICD-10-CM ICD-9-CM    1. Age-related nuclear cataract of both eyes  H25.13 366.16    2. Anxiety  F41.9 300.00    3.  Diabetes mellitus due to underlying condition with hyperosmolarity without coma, with long-term current use of insulin ([facility])  E08.00 249.20     Z79.4 V58.67    4. Dry eye syndrome, bilateral  H04. 123 375.15        Recommendations:

## 2021-10-30 NOTE — Clinical Note
Sheath #1: Sheath: inserted. Sheath inserted/placed in the left subclavian  vein. Hemostasis achieved.

## 2021-10-30 NOTE — Clinical Note
Right atrium lead screwed into place.  RAREFORM MRI - 45cm [FreeTextEntry1] : Patient is doing well, denies feeling tired, having cold intolerance, or palpitations. No dryness of the skin or hair loss. No chest pain or SOB. Taking the Levothyroxine regularly ½ hour before breakfast. Her weight has not changed.  The thyroid tests are within normal limits. The US thyroid is basically unchanged, she sees the Head and Neck surgeon Dr. Chaudhry. \par \par \par

## 2021-10-30 NOTE — Clinical Note
Right atrium lead sutured in place using other (document in comment) sutures. CapSureFix Novus MRI - 45cm/ sheath pealed away prior to suturing.  Sutured with O Ti-Cron

## 2021-10-30 NOTE — Clinical Note
TRANSFER - IN REPORT:     Verbal report received from: . Report consisted of patient's Situation, Background, Assessment and   Recommendations(SBAR). Opportunity for questions and clarification was provided. Assessment completed upon patient's arrival to unit and care assumed. Patient transported with a Registered Nurse and [address] [place] [address] / [facility] [employer].

## 2021-10-30 NOTE — Clinical Note
TRANSFER - OUT REPORT:     Verbal report given to: Kevin Whitlock . Report consisted of patient's Situation, Background, Assessment and   Recommendations(SBAR). Opportunity for questions and clarification was provided. Patient transported with a Registered Nurse. Patient transported to: [facility], 102. TRANSFER - OUT REPORT:    Medtronic dual chamber PPM with Dr. Maria Antonia Quinn  DDD 60:120    Primaseal & pressure dressing applied to Left chest.  No bleeding or hematoma, site soft    Left arm placed in sling       4 mg Versed  75 mcg Fentanyl  2 g Ancef     Verbal report given to Kevin Whitlock on Mohan Severino  being transferred to [facility] for routine progression of care       Report consisted of patient's Situation, Background, Assessment and   Recommendations(SBAR). Information from the following report(s) Procedure Summary and MAR was reviewed with the receiving nurse. Opportunity for questions and clarification was provided.       Patient transported with:   Registered Nurse

## 2021-10-31 ENCOUNTER — APPOINTMENT (OUTPATIENT)
Dept: NON INVASIVE DIAGNOSTICS | Age: 71
DRG: 243 | End: 2021-10-31
Attending: NURSE PRACTITIONER
Payer: MEDICARE

## 2021-10-31 LAB
ANION GAP SERPL CALC-SCNC: 7 MMOL/L (ref 7–16)
APPEARANCE UR: CLEAR
BACTERIA URNS QL MICRO: 0 /HPF
BILIRUB UR QL: NEGATIVE
BUN SERPL-MCNC: 13 MG/DL (ref 8–23)
CALCIUM SERPL-MCNC: 8.1 MG/DL (ref 8.3–10.4)
CASTS URNS QL MICRO: ABNORMAL /LPF
CHLORIDE SERPL-SCNC: 107 MMOL/L (ref 98–107)
CO2 SERPL-SCNC: 23 MMOL/L (ref 21–32)
COLOR UR: YELLOW
CREAT SERPL-MCNC: 0.6 MG/DL (ref 0.6–1)
ECHO AO ASC DIAM: 2.7 CM
ECHO AO ROOT DIAM: 2.7 CM
ECHO AV AREA PEAK VELOCITY: 2.05 CM2
ECHO AV AREA/BSA PEAK VELOCITY: 1.3 CM2/M2
ECHO AV PEAK GRADIENT: 12 MMHG
ECHO AV PEAK VELOCITY: 176 CM/S
ECHO EST RA PRESSURE: 3 MMHG
ECHO LA AREA 2C: 21.4 CM2
ECHO LA AREA 4C: 16.8 CM2
ECHO LA MAJOR AXIS: 5.99 CM
ECHO LA MINOR AXIS: 3.86 CM
ECHO LV E' LATERAL VELOCITY: 5.13 CM/S
ECHO LV E' SEPTAL VELOCITY: 5.22 CM/S
ECHO LV EDV A2C: 43.2 CM3
ECHO LV EDV A4C: 48.6 CM3
ECHO LV ESV A2C: 10.3 CM3
ECHO LV ESV A4C: 11.8 CM3
ECHO LV INTERNAL DIMENSION DIASTOLIC: 2.92 CM (ref 3.9–5.3)
ECHO LV INTERNAL DIMENSION SYSTOLIC: 2.19 CM
ECHO LV IVSD: 1.03 CM (ref 0.6–0.9)
ECHO LV MASS 2D: 85.1 G (ref 67–162)
ECHO LV MASS INDEX 2D: 54.9 G/M2 (ref 43–95)
ECHO LV POSTERIOR WALL DIASTOLIC: 1.07 CM (ref 0.6–0.9)
ECHO LVOT DIAM: 1.9 CM
ECHO LVOT PEAK GRADIENT: 6 MMHG
ECHO MV A VELOCITY: 135 CM/S
ECHO MV E DECELERATION TIME (DT): 190 MS
ECHO MV E VELOCITY: 102 CM/S
ECHO MV E/A RATIO: 0.76
ECHO MV E/E' LATERAL: 19.88
ECHO MV E/E' RATIO (AVERAGED): 19.71
ECHO MV E/E' SEPTAL: 19.54
ECHO RV INTERNAL DIMENSION: 2.53 CM
ECHO RV TAPSE: 1.95 CM (ref 1.5–2)
EPI CELLS #/AREA URNS HPF: ABNORMAL /HPF
ERYTHROCYTE [DISTWIDTH] IN BLOOD BY AUTOMATED COUNT: 11.6 % (ref 11.9–14.6)
GLUCOSE BLD STRIP.AUTO-MCNC: 193 MG/DL (ref 65–100)
GLUCOSE BLD STRIP.AUTO-MCNC: 213 MG/DL (ref 65–100)
GLUCOSE BLD STRIP.AUTO-MCNC: 224 MG/DL (ref 65–100)
GLUCOSE BLD STRIP.AUTO-MCNC: 243 MG/DL (ref 65–100)
GLUCOSE SERPL-MCNC: 185 MG/DL (ref 65–100)
GLUCOSE UR STRIP.AUTO-MCNC: 500 MG/DL
HCT VFR BLD AUTO: 36.1 % (ref 35.8–46.3)
HGB BLD-MCNC: 12 G/DL (ref 11.7–15.4)
HGB UR QL STRIP: ABNORMAL
KETONES UR QL STRIP.AUTO: >80 MG/DL
LEUKOCYTE ESTERASE UR QL STRIP.AUTO: NEGATIVE
MAGNESIUM SERPL-MCNC: 2.1 MG/DL (ref 1.8–2.4)
MCH RBC QN AUTO: 28.4 PG (ref 26.1–32.9)
MCHC RBC AUTO-ENTMCNC: 33.2 G/DL (ref 31.4–35)
MCV RBC AUTO: 85.3 FL (ref 79.6–97.8)
NITRITE UR QL STRIP.AUTO: NEGATIVE
NRBC # BLD: 0 K/UL (ref 0–0.2)
PH UR STRIP: 6.5 [PH] (ref 5–9)
PLATELET # BLD AUTO: 236 K/UL (ref 150–450)
PMV BLD AUTO: 10 FL (ref 9.4–12.3)
POTASSIUM SERPL-SCNC: 3.7 MMOL/L (ref 3.5–5.1)
PROT UR STRIP-MCNC: NEGATIVE MG/DL
RBC # BLD AUTO: 4.23 M/UL (ref 4.05–5.2)
RBC #/AREA URNS HPF: ABNORMAL /HPF
SERVICE CMNT-IMP: ABNORMAL
SODIUM SERPL-SCNC: 137 MMOL/L (ref 136–145)
SP GR UR REFRACTOMETRY: 1.02 (ref 1–1.02)
UROBILINOGEN UR QL STRIP.AUTO: 0.2 EU/DL (ref 0.2–1)
WBC # BLD AUTO: 8.3 K/UL (ref 4.3–11.1)
WBC URNS QL MICRO: ABNORMAL /HPF

## 2021-10-31 PROCEDURE — 93306 TTE W/DOPPLER COMPLETE: CPT

## 2021-10-31 PROCEDURE — 80048 BASIC METABOLIC PNL TOTAL CA: CPT

## 2021-10-31 PROCEDURE — 81001 URINALYSIS AUTO W/SCOPE: CPT

## 2021-10-31 PROCEDURE — 74011250637 HC RX REV CODE- 250/637: Performed by: INTERNAL MEDICINE

## 2021-10-31 PROCEDURE — 85027 COMPLETE CBC AUTOMATED: CPT

## 2021-10-31 PROCEDURE — 83735 ASSAY OF MAGNESIUM: CPT

## 2021-10-31 PROCEDURE — 74011250636 HC RX REV CODE- 250/636: Performed by: NURSE PRACTITIONER

## 2021-10-31 PROCEDURE — 74011250637 HC RX REV CODE- 250/637: Performed by: NURSE PRACTITIONER

## 2021-10-31 PROCEDURE — 2709999900 HC NON-CHARGEABLE SUPPLY

## 2021-10-31 PROCEDURE — 65660000004 HC RM CVT STEPDOWN

## 2021-10-31 PROCEDURE — 74011636637 HC RX REV CODE- 636/637: Performed by: NURSE PRACTITIONER

## 2021-10-31 PROCEDURE — 36415 COLL VENOUS BLD VENIPUNCTURE: CPT

## 2021-10-31 PROCEDURE — 99232 SBSQ HOSP IP/OBS MODERATE 35: CPT | Performed by: INTERNAL MEDICINE

## 2021-10-31 PROCEDURE — 82962 GLUCOSE BLOOD TEST: CPT

## 2021-10-31 RX ORDER — LANOLIN ALCOHOL/MO/W.PET/CERES
400 CREAM (GRAM) TOPICAL ONCE
Status: COMPLETED | OUTPATIENT
Start: 2021-10-31 | End: 2021-10-31

## 2021-10-31 RX ORDER — POTASSIUM CHLORIDE 20 MEQ/1
20 TABLET, EXTENDED RELEASE ORAL 2 TIMES DAILY
Status: COMPLETED | OUTPATIENT
Start: 2021-10-31 | End: 2021-10-31

## 2021-10-31 RX ORDER — POTASSIUM CHLORIDE 20 MEQ/1
40 TABLET, EXTENDED RELEASE ORAL
Status: COMPLETED | OUTPATIENT
Start: 2021-10-31 | End: 2021-10-31

## 2021-10-31 RX ADMIN — ACETAMINOPHEN 650 MG: 325 TABLET ORAL at 12:27

## 2021-10-31 RX ADMIN — ONDANSETRON 4 MG: 2 INJECTION INTRAMUSCULAR; INTRAVENOUS at 08:42

## 2021-10-31 RX ADMIN — INSULIN LISPRO 2 UNITS: 100 INJECTION, SOLUTION INTRAVENOUS; SUBCUTANEOUS at 05:56

## 2021-10-31 RX ADMIN — POTASSIUM CHLORIDE 20 MEQ: 20 TABLET, EXTENDED RELEASE ORAL at 17:37

## 2021-10-31 RX ADMIN — INSULIN LISPRO 4 UNITS: 100 INJECTION, SOLUTION INTRAVENOUS; SUBCUTANEOUS at 21:23

## 2021-10-31 RX ADMIN — POTASSIUM CHLORIDE 20 MEQ: 20 TABLET, EXTENDED RELEASE ORAL at 09:23

## 2021-10-31 RX ADMIN — LEVOTHYROXINE, LIOTHYRONINE 30 MG: 38; 9 TABLET ORAL at 09:23

## 2021-10-31 RX ADMIN — POTASSIUM CHLORIDE 40 MEQ: 20 TABLET, EXTENDED RELEASE ORAL at 00:45

## 2021-10-31 RX ADMIN — Medication 400 MG: at 00:45

## 2021-10-31 RX ADMIN — HYDRALAZINE HYDROCHLORIDE 20 MG: 20 INJECTION INTRAMUSCULAR; INTRAVENOUS at 05:41

## 2021-10-31 RX ADMIN — Medication 10 ML: at 21:18

## 2021-10-31 RX ADMIN — EZETIMIBE 10 MG: 10 TABLET ORAL at 09:23

## 2021-10-31 RX ADMIN — OXYCODONE HYDROCHLORIDE AND ACETAMINOPHEN 500 MG: 500 TABLET ORAL at 09:22

## 2021-10-31 RX ADMIN — ACETAMINOPHEN 650 MG: 325 TABLET ORAL at 23:24

## 2021-10-31 RX ADMIN — MONTELUKAST 10 MG: 10 TABLET, FILM COATED ORAL at 21:17

## 2021-10-31 RX ADMIN — HYDRALAZINE HYDROCHLORIDE 20 MG: 20 INJECTION INTRAMUSCULAR; INTRAVENOUS at 23:20

## 2021-10-31 RX ADMIN — Medication 10 ML: at 03:18

## 2021-10-31 RX ADMIN — ALOGLIPTIN 25 MG: 25 TABLET, FILM COATED ORAL at 09:23

## 2021-10-31 RX ADMIN — INSULIN LISPRO 4 UNITS: 100 INJECTION, SOLUTION INTRAVENOUS; SUBCUTANEOUS at 17:35

## 2021-10-31 RX ADMIN — LOSARTAN POTASSIUM 25 MG: 50 TABLET, FILM COATED ORAL at 09:23

## 2021-10-31 RX ADMIN — INSULIN LISPRO 4 UNITS: 100 INJECTION, SOLUTION INTRAVENOUS; SUBCUTANEOUS at 12:28

## 2021-10-31 RX ADMIN — FLUTICASONE PROPIONATE 2 SPRAY: 50 SPRAY, METERED NASAL at 09:23

## 2021-10-31 NOTE — PROGRESS NOTES
TRANSFER - IN REPORT:    Verbal report received from EMS(name) on Mike Mitchell  being received from [facility] (unit) for urgent transfer      Report consisted of patients Situation, Background, Assessment and   Recommendations(SBAR). Information from the following report(s) SBAR, ED Summary, Procedure Summary, MAR and Recent Results was reviewed with the receiving nurse. Screening Assessment for C Diff:     1. Three (3) or more diarrheal (liquid unformed) stools in less than 24 hours  no     2. If yes, has patient off laxatives for more than 24 hours? Not applicable     3. Was a stool specimen sent for C. Difficile toxin A and B? Not applicable     4. Was the patient placed on contact isolation? No    Opportunity for questions and clarification was provided. Assessment completed upon patients arrival to unit and care assumed. Patient's skin is intact with scarum benign.

## 2021-10-31 NOTE — PROGRESS NOTES
TRANSFER - IN REPORT:    Verbal report received from [address] (name) on Remington Lennox  being received from [facility] (unit) for routine progression of care      Report consisted of patients Situation, Background, Assessment and   Recommendations(SBAR). Information from the following report(s) SBAR, Kardex, Intake/Output, MAR and Cardiac Rhythm NSR was reviewed with the receiving nurse. Opportunity for questions and clarification was provided. Assessment completed upon patients arrival to unit and care assumed. Dual nurse skin assessment performed. No areas of breakdown noted.

## 2021-10-31 NOTE — PROGRESS NOTES
Spoke with NP with Cardiology, T. S., pt with prior to admit history of blurry vision, and dizziness. Pt is also scheduled for an outpatient MRI tomorrow, 11/1/21 by her PCP for the dizziness that pt has c/o. Pt aware that she might miss her MRI appt since she is in hospital. NP to discuss with MD in order to coordinate for patient's care.

## 2021-10-31 NOTE — PROGRESS NOTES
History of present illness:71 y.o. female anxiety worse this am No arrhythmia     Assessment    · Atrial fibrillation new diagnosis patient with [id number] Score for Atrial Fibrillation Stroke Risk 4.    · Hold Eliquis for device tonight. Bradycardia   · patient with episodes of symptomatic bradycardia off rate control medications suggesting underlying conduction disease. · This was associated with a syncopal event and rates in the 30 to 40 bpm range. · Echo today. · Class I indications for device therapy. Hypertension  · Uncontrolled  · Resumed home losartan     Diabetes uncontrolled  · sliding scale insulin  · Patient likely candidate for SGL 2 and GLP-1 therapy in the future     Hypothyroidism  · TSH suppressed. Review of Systems   Constitutional: Negative for fever. HENT: Negative for hearing loss. Respiratory: Negative for cough. Cardiovascular: Negative for chest pain. Gastrointestinal: Negative for heartburn. Genitourinary: Negative for dysuria. Musculoskeletal: Negative for myalgias. Skin: Negative for rash. Endo/Heme/Allergies: Does not bruise/bleed easily. Psychiatric/Behavioral: Negative for depression.      Past Medical History:   Diagnosis Date    Adverse effect of anesthesia     patient states she is slower to wake up than other and feels groggy longer    Anxiety     Depression     Diabetes ([address]     Hypercholesterolemia     Hypertension     IBS (irritable bowel syndrome)     Lumbar disc herniation     Sjogren's syndrome (HCC)     dry eye and fatigue are her symptoms    Sleep apnea     patient does not use a c-pap    TBI (traumatic brain injury) ([address]     Thyroid disease     hypothyroidism     Past Surgical History:   Procedure Laterality Date    COLONOSCOPY N/A 5/5/2017    COLONOSCOPY/ 23 performed by Ting Soto MD at [facility] ENDOSCOPY    HX LAP CHOLECYSTECTOMY  11/2007    HX ORTHOPAEDIC      '18 laser treaatment lumber disc herniation  HX RETINAL DETACHMENT REPAIR Right 09/2021    HX TUBAL LIGATION  1976     Family History   Problem Relation Age of Onset    Heart Disease Father     Stroke Father     Cancer Father         Godfrey    Heart Disease Brother     Diabetes Brother         DMII    Cancer Paternal Grandfather         Godfrey    Cancer Paternal Aunt         ovarian    Breast Cancer Neg Hx      Social History     Socioeconomic History    Marital status:      Spouse name: Not on file    Number of children: Not on file    Years of education: Not on file    Highest education level: Not on file   Occupational History    Not on file   Tobacco Use    Smoking status: Never Smoker    Smokeless tobacco: Never Used   Vaping Use    Vaping Use: Never used   Substance and Sexual Activity    Alcohol use: No    Drug use: No    Sexual activity: Yes     Partners: Male   Other Topics Concern    Not on file   Social History Narrative    Not on file     Social Determinants of Health     Financial Resource Strain:     Difficulty of Paying Living Expenses:    Food Insecurity:     Worried About Running Out of Food in the Last Year:     Ran Out of Food in the Last Year:    Transportation Needs:     Lack of Transportation (Medical):      Lack of Transportation (Non-Medical):    Physical Activity:     Days of Exercise per Week:     Minutes of Exercise per Session:    Stress:     Feeling of Stress :    Social Connections:     Frequency of Communication with Friends and Family:     Frequency of Social Gatherings with Friends and Family:     Attends Orthodox Services:     Active Member of Clubs or Organizations:     Attends Club or Organization Meetings:     Marital Status:    Intimate Partner Violence:     Fear of Current or Ex-Partner:     Emotionally Abused:     Physically Abused:     Sexually Abused:      Current Facility-Administered Medications   Medication Dose Route Frequency    potassium chloride (K-DUR, KLOR-CON) SR tablet 20 mEq  20 mEq Oral BID    ascorbic acid (vitamin C) (VITAMIN C) tablet 500 mg  500 mg Oral DAILY    colestipoL (COLESTID) tablet 1 g (Patient Supplied)  1 g Oral BID    ezetimibe (ZETIA) tablet 10 mg  10 mg Oral DAILY    fluticasone propionate (FLONASE) 50 mcg/actuation nasal spray 2 Spray  2 Spray Both Nostrils DAILY    alogliptin (NESINA) tablet 25 mg  25 mg Oral DAILY    losartan (COZAAR) tablet 25 mg  25 mg Oral DAILY    montelukast (SINGULAIR) tablet 10 mg  10 mg Oral QHS    thyroid (Pork) (ARMOUR) tablet 30 mg  30 mg Oral DAILY    sodium chloride (NS) flush 5-40 mL  5-40 mL IntraVENous Q8H    sodium chloride (NS) flush 5-40 mL  5-40 mL IntraVENous PRN    [Held by provider] apixaban (ELIQUIS) tablet 5 mg  5 mg Oral BID    acetaminophen (TYLENOL) tablet 650 mg  650 mg Oral Q4H PRN    naloxone (NARCAN) injection 0.4 mg  0.4 mg IntraVENous PRN    docusate sodium (COLACE) capsule 100 mg  100 mg Oral BID PRN    alum-mag hydroxide-simeth (MYLANTA) oral suspension 30 mL  30 mL Oral Q4H PRN    insulin lispro (HUMALOG) injection   SubCUTAneous AC&HS    alum-mag hydroxide-simeth (MYLANTA) oral suspension 30 mL  30 mL Oral Q4H PRN    diphenhydrAMINE (BENADRYL) capsule 25 mg  25 mg Oral Q6H PRN    ondansetron (ZOFRAN) injection 4 mg  4 mg IntraVENous Q4H PRN    hydrALAZINE (APRESOLINE) 20 mg/mL injection 20 mg  20 mg IntraVENous Q6H PRN    lip protectant (BLISTEX) ointment 1 Each  1 Each Topical PRN    LORazepam (ATIVAN) 2 mg/mL injection         Visit Vitals  BP (!) 140/65   Pulse 85   Temp 98.1 °F (36.7 °C)   Resp 19   Wt 128 lb 12 oz (58.4 kg)   SpO2 95%   BMI 24.94 kg/m²     Physical Exam  Constitutional:       Appearance: She is well-developed. She is not diaphoretic. HENT:      Head: Normocephalic and atraumatic. Mouth/Throat:      Pharynx: No oropharyngeal exudate. Eyes:      General: No scleral icterus. Right eye: No discharge. Left eye: No discharge. Conjunctiva/sclera: Conjunctivae normal.      Pupils: Pupils are equal, round, and reactive to light. Neck:      Vascular: No JVD. Trachea: No tracheal deviation. Cardiovascular:      Rate and Rhythm: Normal rate. Heart sounds: Normal heart sounds. No murmur heard. Pulmonary:      Effort: Pulmonary effort is normal.      Breath sounds: No wheezing. Abdominal:      General: There is no distension. Palpations: There is no mass. Tenderness: There is no abdominal tenderness. There is no guarding or rebound. Musculoskeletal:         General: No tenderness. Normal range of motion. Cervical back: Normal range of motion. Skin:     General: Skin is warm and dry. Coloration: Skin is not pale. Findings: No rash. Neurological:      Mental Status: She is alert and oriented to person, place, and time. Cranial Nerves: No cranial nerve deficit. Psychiatric:         Behavior: Behavior normal.         Thought Content:  Thought content normal.         Intake/Output Summary (Last 24 hours) at 10/31/2021 0717  Last data filed at 10/31/2021 0112  Gross per 24 hour   Intake 801.67 ml   Output 600 ml   Net 201.67 ml     Lab Results   Component Value Date/Time    Sodium 137 10/31/2021 03:17 AM    Potassium 3.7 10/31/2021 03:17 AM    Chloride 107 10/31/2021 03:17 AM    CO2 23 10/31/2021 03:17 AM    Anion gap 7 10/31/2021 03:17 AM    Glucose 185 (H) 10/31/2021 03:17 AM    BUN 13 10/31/2021 03:17 AM    Creatinine 0.60 10/31/2021 03:17 AM    BUN/Creatinine ratio 15 10/18/2021 11:27 AM    GFR est AA >60 10/31/2021 03:17 AM    GFR est non-AA >60 10/31/2021 03:17 AM    Calcium 8.1 (L) 10/31/2021 03:17 AM     Lab Results   Component Value Date/Time    WBC 8.3 10/31/2021 03:17 AM    HGB 12.0 10/31/2021 03:17 AM    HCT 36.1 10/31/2021 03:17 AM    PLATELET 544 21/20/9714 03:17 AM    MCV 85.3 10/31/2021 03:17 AM     No results found for: CPK, RCK1, RCK2, RCK3, RCK4, CKMB, CKNDX, CKND1, TROPT, TROIQ, BNPP, BNP  Lab Results   Component Value Date/Time    Hemoglobin A1c 9.8 (H) 10/18/2021 11:27 AM         Assessment:    ICD-10-CM ICD-9-CM    1. Age-related nuclear cataract of both eyes  H25.13 366.16    2. Anxiety  F41.9 300.00    3. Diabetes mellitus due to underlying condition with hyperosmolarity without coma, with long-term current use of insulin ([facility])  E08.00 249.20     Z79.4 V58.67    4. Dry eye syndrome, bilateral  H04. 123 375.15    5.  Symptomatic bradycardia  R00.1 427.89        Recommendations:

## 2021-10-31 NOTE — PROGRESS NOTES
TRANSFER - OUT REPORT:    Verbal report given to Aron Baltazar on [facility]  being transferred to [facility] [id number] [phone] for routine progression of care       Report consisted of patients Situation, Background, Assessment and Recommendations(SBAR). Information from the following report(s) SBAR, Procedure Summary, MAR, Recent Results and Cardiac Rhythm Sinus rhythm was reviewed with the receiving nurse. Opportunity for questions and clarification was provided.

## 2021-10-31 NOTE — PROGRESS NOTES
Dr. Elza Larkin in to see. Notified of last night's patient complaints of blurry vision, chest pain.

## 2021-11-01 ENCOUNTER — APPOINTMENT (OUTPATIENT)
Dept: GENERAL RADIOLOGY | Age: 71
DRG: 243 | End: 2021-11-01
Attending: INTERNAL MEDICINE
Payer: MEDICARE

## 2021-11-01 LAB
ANION GAP SERPL CALC-SCNC: 10 MMOL/L (ref 7–16)
ATRIAL RATE: 87 BPM
BUN SERPL-MCNC: 16 MG/DL (ref 8–23)
CALCIUM SERPL-MCNC: 8.8 MG/DL (ref 8.3–10.4)
CALCULATED P AXIS, ECG09: 49 DEGREES
CALCULATED R AXIS, ECG10: -6 DEGREES
CALCULATED T AXIS, ECG11: 45 DEGREES
CHLORIDE SERPL-SCNC: 104 MMOL/L (ref 98–107)
CO2 SERPL-SCNC: 20 MMOL/L (ref 21–32)
CREAT SERPL-MCNC: 0.79 MG/DL (ref 0.6–1)
DIAGNOSIS, 93000: NORMAL
ERYTHROCYTE [DISTWIDTH] IN BLOOD BY AUTOMATED COUNT: 11.9 % (ref 11.9–14.6)
GLUCOSE BLD STRIP.AUTO-MCNC: 175 MG/DL (ref 65–100)
GLUCOSE BLD STRIP.AUTO-MCNC: 193 MG/DL (ref 65–100)
GLUCOSE BLD STRIP.AUTO-MCNC: 206 MG/DL (ref 65–100)
GLUCOSE BLD STRIP.AUTO-MCNC: 214 MG/DL (ref 65–100)
GLUCOSE SERPL-MCNC: 203 MG/DL (ref 65–100)
HCT VFR BLD AUTO: 37.7 % (ref 35.8–46.3)
HGB BLD-MCNC: 12.6 G/DL (ref 11.7–15.4)
MAGNESIUM SERPL-MCNC: 2.1 MG/DL (ref 1.8–2.4)
MCH RBC QN AUTO: 28.8 PG (ref 26.1–32.9)
MCHC RBC AUTO-ENTMCNC: 33.4 G/DL (ref 31.4–35)
MCV RBC AUTO: 86.1 FL (ref 79.6–97.8)
NRBC # BLD: 0 K/UL (ref 0–0.2)
P-R INTERVAL, ECG05: 168 MS
PLATELET # BLD AUTO: 278 K/UL (ref 150–450)
PMV BLD AUTO: 10.3 FL (ref 9.4–12.3)
POTASSIUM SERPL-SCNC: 3.5 MMOL/L (ref 3.5–5.1)
Q-T INTERVAL, ECG07: 376 MS
QRS DURATION, ECG06: 68 MS
QTC CALCULATION (BEZET), ECG08: 452 MS
RBC # BLD AUTO: 4.38 M/UL (ref 4.05–5.2)
SERVICE CMNT-IMP: ABNORMAL
SODIUM SERPL-SCNC: 134 MMOL/L (ref 136–145)
VENTRICULAR RATE, ECG03: 87 BPM
WBC # BLD AUTO: 9.9 K/UL (ref 4.3–11.1)

## 2021-11-01 PROCEDURE — 74011000250 HC RX REV CODE- 250: Performed by: INTERNAL MEDICINE

## 2021-11-01 PROCEDURE — 77030041279 HC DRSG PRMSL AG MDII -B: Performed by: INTERNAL MEDICINE

## 2021-11-01 PROCEDURE — 0JH606Z INSERTION OF PACEMAKER, DUAL CHAMBER INTO CHEST SUBCUTANEOUS TISSUE AND FASCIA, OPEN APPROACH: ICD-10-PCS | Performed by: INTERNAL MEDICINE

## 2021-11-01 PROCEDURE — 74011250637 HC RX REV CODE- 250/637: Performed by: NURSE PRACTITIONER

## 2021-11-01 PROCEDURE — 33208 INSRT HEART PM ATRIAL & VENT: CPT | Performed by: INTERNAL MEDICINE

## 2021-11-01 PROCEDURE — 02H63JZ INSERTION OF PACEMAKER LEAD INTO RIGHT ATRIUM, PERCUTANEOUS APPROACH: ICD-10-PCS | Performed by: INTERNAL MEDICINE

## 2021-11-01 PROCEDURE — 71045 X-RAY EXAM CHEST 1 VIEW: CPT

## 2021-11-01 PROCEDURE — 77030031139 HC SUT VCRL2 J&J -A: Performed by: INTERNAL MEDICINE

## 2021-11-01 PROCEDURE — 74011636637 HC RX REV CODE- 636/637: Performed by: NURSE PRACTITIONER

## 2021-11-01 PROCEDURE — 74011250636 HC RX REV CODE- 250/636: Performed by: NURSE PRACTITIONER

## 2021-11-01 PROCEDURE — C1892 INTRO/SHEATH,FIXED,PEEL-AWAY: HCPCS | Performed by: INTERNAL MEDICINE

## 2021-11-01 PROCEDURE — 99152 MOD SED SAME PHYS/QHP 5/>YRS: CPT | Performed by: INTERNAL MEDICINE

## 2021-11-01 PROCEDURE — 77030003028 HC SUT VCRL J&J -A: Performed by: INTERNAL MEDICINE

## 2021-11-01 PROCEDURE — L3650 SO 8 ABD RESTRAINT PRE OTS: HCPCS | Performed by: INTERNAL MEDICINE

## 2021-11-01 PROCEDURE — C1785 PMKR, DUAL, RATE-RESP: HCPCS | Performed by: INTERNAL MEDICINE

## 2021-11-01 PROCEDURE — 77030018579 HC SUT TICRN1 COVD -B: Performed by: INTERNAL MEDICINE

## 2021-11-01 PROCEDURE — 99232 SBSQ HOSP IP/OBS MODERATE 35: CPT | Performed by: INTERNAL MEDICINE

## 2021-11-01 PROCEDURE — 2709999900 HC NON-CHARGEABLE SUPPLY

## 2021-11-01 PROCEDURE — 83735 ASSAY OF MAGNESIUM: CPT

## 2021-11-01 PROCEDURE — C1898 LEAD, PMKR, OTHER THAN TRANS: HCPCS | Performed by: INTERNAL MEDICINE

## 2021-11-01 PROCEDURE — 77030011747 HC SEAL HEMSTAT TELE -C: Performed by: INTERNAL MEDICINE

## 2021-11-01 PROCEDURE — 74011250636 HC RX REV CODE- 250/636: Performed by: INTERNAL MEDICINE

## 2021-11-01 PROCEDURE — 65660000004 HC RM CVT STEPDOWN

## 2021-11-01 PROCEDURE — 80048 BASIC METABOLIC PNL TOTAL CA: CPT

## 2021-11-01 PROCEDURE — 36415 COLL VENOUS BLD VENIPUNCTURE: CPT

## 2021-11-01 PROCEDURE — 82962 GLUCOSE BLOOD TEST: CPT

## 2021-11-01 PROCEDURE — 77030010507 HC ADH SKN DERMBND J&J -B: Performed by: INTERNAL MEDICINE

## 2021-11-01 PROCEDURE — 77030013504 HC DEV ELECSURG MEDT -F: Performed by: INTERNAL MEDICINE

## 2021-11-01 PROCEDURE — 93005 ELECTROCARDIOGRAM TRACING: CPT | Performed by: INTERNAL MEDICINE

## 2021-11-01 PROCEDURE — 85027 COMPLETE CBC AUTOMATED: CPT

## 2021-11-01 PROCEDURE — 02HK3JZ INSERTION OF PACEMAKER LEAD INTO RIGHT VENTRICLE, PERCUTANEOUS APPROACH: ICD-10-PCS | Performed by: INTERNAL MEDICINE

## 2021-11-01 PROCEDURE — 99153 MOD SED SAME PHYS/QHP EA: CPT | Performed by: INTERNAL MEDICINE

## 2021-11-01 DEVICE — LEAD 5076-52 MRI US RCMCRD
Type: IMPLANTABLE DEVICE | Status: FUNCTIONAL
Brand: CAPSUREFIX NOVUS MRI™ SURESCAN®

## 2021-11-01 DEVICE — LEAD 5076-45 MRI US RCMCRD
Type: IMPLANTABLE DEVICE | Status: FUNCTIONAL
Brand: CAPSUREFIX NOVUS MRI™ SURESCAN®

## 2021-11-01 DEVICE — IPG W1DR01 AZURE XT DR MRI USA
Type: IMPLANTABLE DEVICE | Status: FUNCTIONAL
Brand: AZURE™ XT DR MRI SURESCAN™

## 2021-11-01 RX ORDER — LIDOCAINE HYDROCHLORIDE 10 MG/ML
INJECTION INFILTRATION; PERINEURAL AS NEEDED
Status: DISCONTINUED | OUTPATIENT
Start: 2021-11-01 | End: 2021-11-01 | Stop reason: HOSPADM

## 2021-11-01 RX ORDER — LOSARTAN POTASSIUM 50 MG/1
25 TABLET ORAL ONCE
Status: COMPLETED | OUTPATIENT
Start: 2021-11-01 | End: 2021-11-01

## 2021-11-01 RX ORDER — SODIUM CHLORIDE 0.9 % (FLUSH) 0.9 %
5-40 SYRINGE (ML) INJECTION EVERY 8 HOURS
Status: DISCONTINUED | OUTPATIENT
Start: 2021-11-01 | End: 2021-11-02 | Stop reason: HOSPADM

## 2021-11-01 RX ORDER — INSULIN GLARGINE 100 [IU]/ML
5 INJECTION, SOLUTION SUBCUTANEOUS DAILY
Status: DISCONTINUED | OUTPATIENT
Start: 2021-11-01 | End: 2021-11-02 | Stop reason: HOSPADM

## 2021-11-01 RX ORDER — CEFAZOLIN SODIUM/WATER 2 G/20 ML
SYRINGE (ML) INTRAVENOUS
Status: COMPLETED | OUTPATIENT
Start: 2021-11-01 | End: 2021-11-01

## 2021-11-01 RX ORDER — CEFAZOLIN SODIUM/WATER 2 G/20 ML
2 SYRINGE (ML) INTRAVENOUS EVERY 6 HOURS
Status: DISCONTINUED | OUTPATIENT
Start: 2021-11-01 | End: 2021-11-02 | Stop reason: HOSPADM

## 2021-11-01 RX ORDER — SODIUM CHLORIDE 0.9 % (FLUSH) 0.9 %
5-40 SYRINGE (ML) INJECTION AS NEEDED
Status: DISCONTINUED | OUTPATIENT
Start: 2021-11-01 | End: 2021-11-02 | Stop reason: HOSPADM

## 2021-11-01 RX ORDER — LOSARTAN POTASSIUM 50 MG/1
50 TABLET ORAL DAILY
Status: DISCONTINUED | OUTPATIENT
Start: 2021-11-02 | End: 2021-11-02

## 2021-11-01 RX ORDER — MONTELUKAST SODIUM 4 MG/1
1 TABLET, CHEWABLE ORAL AS NEEDED
Status: DISCONTINUED | OUTPATIENT
Start: 2021-11-01 | End: 2021-11-02 | Stop reason: HOSPADM

## 2021-11-01 RX ORDER — FENTANYL CITRATE 50 UG/ML
INJECTION, SOLUTION INTRAMUSCULAR; INTRAVENOUS AS NEEDED
Status: DISCONTINUED | OUTPATIENT
Start: 2021-11-01 | End: 2021-11-01 | Stop reason: HOSPADM

## 2021-11-01 RX ORDER — MIDAZOLAM HYDROCHLORIDE 1 MG/ML
INJECTION, SOLUTION INTRAMUSCULAR; INTRAVENOUS AS NEEDED
Status: DISCONTINUED | OUTPATIENT
Start: 2021-11-01 | End: 2021-11-01 | Stop reason: HOSPADM

## 2021-11-01 RX ADMIN — LOSARTAN POTASSIUM 25 MG: 50 TABLET, FILM COATED ORAL at 08:14

## 2021-11-01 RX ADMIN — EZETIMIBE 10 MG: 10 TABLET ORAL at 08:14

## 2021-11-01 RX ADMIN — Medication 10 ML: at 16:11

## 2021-11-01 RX ADMIN — LEVOTHYROXINE, LIOTHYRONINE 30 MG: 38; 9 TABLET ORAL at 06:34

## 2021-11-01 RX ADMIN — INSULIN LISPRO 4 UNITS: 100 INJECTION, SOLUTION INTRAVENOUS; SUBCUTANEOUS at 21:28

## 2021-11-01 RX ADMIN — OXYCODONE HYDROCHLORIDE AND ACETAMINOPHEN 500 MG: 500 TABLET ORAL at 08:14

## 2021-11-01 RX ADMIN — LOSARTAN POTASSIUM 25 MG: 50 TABLET, FILM COATED ORAL at 16:08

## 2021-11-01 RX ADMIN — Medication 10 ML: at 21:29

## 2021-11-01 RX ADMIN — ACETAMINOPHEN 650 MG: 325 TABLET ORAL at 18:27

## 2021-11-01 RX ADMIN — INSULIN LISPRO 2 UNITS: 100 INJECTION, SOLUTION INTRAVENOUS; SUBCUTANEOUS at 16:08

## 2021-11-01 RX ADMIN — CEFAZOLIN 2 G: 10 INJECTION, POWDER, FOR SOLUTION INTRAVENOUS at 21:28

## 2021-11-01 RX ADMIN — Medication 10 ML: at 06:34

## 2021-11-01 RX ADMIN — Medication 10 ML: at 16:12

## 2021-11-01 RX ADMIN — INSULIN GLARGINE 5 UNITS: 100 INJECTION, SOLUTION SUBCUTANEOUS at 08:25

## 2021-11-01 RX ADMIN — ALOGLIPTIN 25 MG: 25 TABLET, FILM COATED ORAL at 08:14

## 2021-11-01 RX ADMIN — INSULIN LISPRO 4 UNITS: 100 INJECTION, SOLUTION INTRAVENOUS; SUBCUTANEOUS at 08:26

## 2021-11-01 RX ADMIN — MONTELUKAST 10 MG: 10 TABLET, FILM COATED ORAL at 21:28

## 2021-11-01 RX ADMIN — FLUTICASONE PROPIONATE 2 SPRAY: 50 SPRAY, METERED NASAL at 08:27

## 2021-11-01 RX ADMIN — HYDRALAZINE HYDROCHLORIDE 20 MG: 20 INJECTION INTRAMUSCULAR; INTRAVENOUS at 23:29

## 2021-11-01 NOTE — PROGRESS NOTES
Reviewed notes for concerns      Per notes:      Patient progressing in her care goals        Will continue to assess how to best serve this family

## 2021-11-01 NOTE — PROGRESS NOTES
[facility] CARDIOLOGY PROGRESS NOTE    11/1/2021 7:31 AM    Admit Date: 10/30/2021        Subjective:   Stable overnight without angina, CHF, or palpitations. Vitals stable and controlled. No other complaints overnight. Tolerating meds well. No pathologic bradycardia overnight but symptomatic bradycardia in the absence of any rate slowing meds as noted in history and physical. Discussed benefits and risks of dual-chamber pacemaker implantation, plan to proceed today. Objective:      Vitals:    10/31/21 2307 10/31/21 2345 11/01/21 0259 11/01/21 0634   BP: (!) 177/84 (!) 149/67 (!) 155/63    Pulse: 91 (!) 108 99    Resp: 18  20    Temp: 98.9 °F (37.2 °C)  99.3 °F (37.4 °C)    SpO2: 97%  95%    Weight:    123 lb 4.8 oz (55.9 kg)   Height:           Physical Exam:  Neck- supple, no JVD  CV- regular rate and rhythm no MRG  Lung- clear bilaterally  Abd- soft, nontender, nondistended  Ext- no edema  Skin- warm and dry    Data Review:   Recent Labs     11/01/21  0321 10/31/21  0317   * 137   K 3.5 3.7   MG 2.1 2.1   BUN 16 13   CREA 0.79 0.60   * 185*   WBC 9.9 8.3   HGB 12.6 12.0   HCT 37.7 36.1    236     Echo 10/31/2021:  Left Ventricle Normal cavity size and systolic function (ejection fraction normal). Mildly increased wall thickness. The estimated EF is 65 - 70%. There is inconclusive left ventricular diastolic function. Left Atrium Normal cavity size. Right Ventricle Normal cavity size and global systolic function. Right Atrium Normal cavity size. Aortic Valve Aortic valve not well visualized. Moderate aortic valve sclerosis. Mitral Valve Mitral valve thickening. Moderate mitral annular calcification. Trace regurgitation. Tricuspid Valve Normal valve structure. Trace regurgitation. Pulmonic Valve Pulmonic valve not well visualized. Aorta The visualized portion of the aortic root is normal size.        Assessment and Plan:     Paroxysmal atrial fibrillation- new diagnosis patient with [id number] Score for Atrial Fibrillation Stroke Risk 4.    · Hold Eliquis for pacemaker today, resume tomorrow afternoon if site and interrogation stable tomorrow morning.     Bradycardia   ·   episodes of symptomatic bradycardia off rate control medications suggesting underlying conduction disease/sinus node dysfunction. · This was associated with a syncopal event and rates in the 30 to 40 bpm range. · Echo as noted above  · Class I indications for device therapy. The benefits and risks of dual-chamber pacemaker implantation were discussed with the patient. Risks including but not limited to bleeding, infection, contrast allergy reaction, acute kidney injury, MI, stroke, emergent CABG, pericardiocentesis and death were discussed. The patient understands the risks of the procedure and wishes to proceed.        Hypertension  · Uncontrolled  · Resumed home losartan -titrate meds as tolerated post pacemaker implantation, augment beta-blockers given PAF history     Diabetes uncontrolled  · sliding scale insulin  · Patient likely candidate for SGL 2 and GLP-1 therapy in the future     Hypothyroidism  · TSH suppressed.        DARRYL Coleman MD  [facility] Cardiology  Pager [phone]

## 2021-11-01 NOTE — PROGRESS NOTES
TRANSFER - IN REPORT:    Verbal report received from cath lab, RN(name) on Blaze Colby  being received from cath lab(unit) for routine post - op      Report consisted of patients Situation, Background, Assessment and   Recommendations(SBAR). Information from the following report(s) SBAR, Kardex, Procedure Summary, Intake/Output, MAR, Recent Results, Med Rec Status and Cardiac Rhythm NSR was reviewed with the receiving nurse. Opportunity for questions and clarification was provided. Assessment completed upon patients arrival to unit and care assumed. Dual skin assessment completed with RN. No breakdown noted. Left subclavian pacemaker site with pressure dressing c/d/i with no bruising or hematoma.

## 2021-11-01 NOTE — PROGRESS NOTES
Notified cath lab staff SBP is 170 and asked if they wanted me to give her the losartan that was prescribed. They said not at the moment. Will reassess upon patient's return to unit.

## 2021-11-01 NOTE — DIABETES MGMT
Patient admitted with symptomatic bradycardia. Admitting blood glucose 315. Blood glucose ranged 185-243 yesterday with patient receiving Humalog 14 units and Alogliptin 25mg. Blood glucose this morning was 214. Reviewed patient current regimen: Humalog correctional insulin and Alogliptin 25mg. Patient NPO for pacemaker implantation today. Patient would likely benefit from initiation of low dose basal insulin as fasting blood glucose is not at goal. Discussed patient glycemic control with provider. New orders received to initiate Lantus 5 units daily.

## 2021-11-01 NOTE — PROGRESS NOTES
Problem: Diabetes Self-Management  Goal: *Disease process and treatment process  Description: Define diabetes and identify own type of diabetes; list 3 options for treating diabetes. Outcome: Progressing Towards Goal  Goal: *Incorporating nutritional management into lifestyle  Description: Describe effect of type, amount and timing of food on blood glucose; list 3 methods for planning meals. Outcome: Progressing Towards Goal  Goal: *Incorporating physical activity into lifestyle  Description: State effect of exercise on blood glucose levels. Outcome: Progressing Towards Goal  Goal: *Developing strategies to promote health/change behavior  Description: Define the ABC's of diabetes; identify appropriate screenings, schedule and personal plan for screenings. Outcome: Progressing Towards Goal  Goal: *Monitoring blood glucose, interpreting and using results  Description: Identify recommended blood glucose targets  and personal targets. Outcome: Progressing Towards Goal  Goal: *Prevention, detection, treatment of acute complications  Description: List symptoms of hyper- and hypoglycemia; describe how to treat low blood sugar and actions for lowering  high blood glucose level. Outcome: Progressing Towards Goal     Problem: Patient Education: Go to Patient Education Activity  Goal: Patient/Family Education  Outcome: Progressing Towards Goal     Problem: Falls - Risk of  Goal: *Absence of Falls  Description: Document Earma Gordy Fall Risk and appropriate interventions in the flowsheet.   Outcome: Progressing Towards Goal  Note: Fall Risk Interventions:  Mobility Interventions: Bed/chair exit alarm, Patient to call before getting OOB         Medication Interventions: Teach patient to arise slowly, Patient to call before getting OOB, Bed/chair exit alarm    Elimination Interventions: Call light in reach    History of Falls Interventions: Bed/chair exit alarm, Door open when patient unattended, Room close to nurse's station         Problem: Patient Education: Go to Patient Education Activity  Goal: Patient/Family Education  Outcome: Progressing Towards Goal     Problem: Pain  Goal: *Control of Pain  Outcome: Progressing Towards Goal     Problem: Patient Education: Go to Patient Education Activity  Goal: Patient/Family Education  Outcome: Progressing Towards Goal

## 2021-11-01 NOTE — PROGRESS NOTES
Pt BP reported to nurse 177/84. Pt given Hydralazine 20 mg IVP as ordered. PT was sinus rhythm now sinus tach on monitor with heart rate up to 110's. Will continue to monitor pt with this also noted some couplets and bigeminy as well.

## 2021-11-01 NOTE — DIABETES MGMT
Attempted to see pt for diabetes education. Spouse at bedside. Pt is off the unit for a procedure. Plan is to see pt tomorrow for diabetes education.

## 2021-11-01 NOTE — PROGRESS NOTES
Care Management Interventions  Transition of Care Consult (CM Consult): Discharge Planning  Support Systems: Spouse/Significant Other    Chart screened by  for discharge planning. No needs identified at this time. Please consult  if any new issues arise.

## 2021-11-02 ENCOUNTER — APPOINTMENT (OUTPATIENT)
Dept: NON INVASIVE DIAGNOSTICS | Age: 71
DRG: 243 | End: 2021-11-02
Attending: NURSE PRACTITIONER
Payer: MEDICARE

## 2021-11-02 VITALS
WEIGHT: 130 LBS | OXYGEN SATURATION: 96 % | TEMPERATURE: 98 F | RESPIRATION RATE: 20 BRPM | BODY MASS INDEX: 25.52 KG/M2 | DIASTOLIC BLOOD PRESSURE: 81 MMHG | HEART RATE: 96 BPM | SYSTOLIC BLOOD PRESSURE: 161 MMHG | HEIGHT: 60 IN

## 2021-11-02 LAB
ANION GAP SERPL CALC-SCNC: 11 MMOL/L (ref 7–16)
BUN SERPL-MCNC: 16 MG/DL (ref 8–23)
CALCIUM SERPL-MCNC: 8.3 MG/DL (ref 8.3–10.4)
CHLORIDE SERPL-SCNC: 104 MMOL/L (ref 98–107)
CO2 SERPL-SCNC: 17 MMOL/L (ref 21–32)
CREAT SERPL-MCNC: 0.74 MG/DL (ref 0.6–1)
ERYTHROCYTE [DISTWIDTH] IN BLOOD BY AUTOMATED COUNT: 11.9 % (ref 11.9–14.6)
GLUCOSE BLD STRIP.AUTO-MCNC: 217 MG/DL (ref 65–100)
GLUCOSE BLD STRIP.AUTO-MCNC: 239 MG/DL (ref 65–100)
GLUCOSE SERPL-MCNC: 244 MG/DL (ref 65–100)
HCT VFR BLD AUTO: 39.8 % (ref 35.8–46.3)
HGB BLD-MCNC: 13.4 G/DL (ref 11.7–15.4)
MAGNESIUM SERPL-MCNC: 1.9 MG/DL (ref 1.8–2.4)
MCH RBC QN AUTO: 29.3 PG (ref 26.1–32.9)
MCHC RBC AUTO-ENTMCNC: 33.7 G/DL (ref 31.4–35)
MCV RBC AUTO: 87.1 FL (ref 79.6–97.8)
NRBC # BLD: 0 K/UL (ref 0–0.2)
PLATELET # BLD AUTO: 257 K/UL (ref 150–450)
PMV BLD AUTO: 10.1 FL (ref 9.4–12.3)
POTASSIUM SERPL-SCNC: 3.7 MMOL/L (ref 3.5–5.1)
RBC # BLD AUTO: 4.57 M/UL (ref 4.05–5.2)
SERVICE CMNT-IMP: ABNORMAL
SERVICE CMNT-IMP: ABNORMAL
SODIUM SERPL-SCNC: 132 MMOL/L (ref 136–145)
WBC # BLD AUTO: 9.1 K/UL (ref 4.3–11.1)

## 2021-11-02 PROCEDURE — 74011250637 HC RX REV CODE- 250/637: Performed by: NURSE PRACTITIONER

## 2021-11-02 PROCEDURE — 85027 COMPLETE CBC AUTOMATED: CPT

## 2021-11-02 PROCEDURE — 99024 POSTOP FOLLOW-UP VISIT: CPT | Performed by: INTERNAL MEDICINE

## 2021-11-02 PROCEDURE — 82962 GLUCOSE BLOOD TEST: CPT

## 2021-11-02 PROCEDURE — 36415 COLL VENOUS BLD VENIPUNCTURE: CPT

## 2021-11-02 PROCEDURE — 74011250636 HC RX REV CODE- 250/636: Performed by: INTERNAL MEDICINE

## 2021-11-02 PROCEDURE — 83735 ASSAY OF MAGNESIUM: CPT

## 2021-11-02 PROCEDURE — 74011250636 HC RX REV CODE- 250/636: Performed by: NURSE PRACTITIONER

## 2021-11-02 PROCEDURE — 74011636637 HC RX REV CODE- 636/637: Performed by: NURSE PRACTITIONER

## 2021-11-02 PROCEDURE — 74011250637 HC RX REV CODE- 250/637: Performed by: INTERNAL MEDICINE

## 2021-11-02 PROCEDURE — 2709999900 HC NON-CHARGEABLE SUPPLY

## 2021-11-02 PROCEDURE — 80048 BASIC METABOLIC PNL TOTAL CA: CPT

## 2021-11-02 PROCEDURE — 93308 TTE F-UP OR LMTD: CPT

## 2021-11-02 PROCEDURE — 74011000250 HC RX REV CODE- 250: Performed by: INTERNAL MEDICINE

## 2021-11-02 RX ORDER — METOPROLOL SUCCINATE 50 MG/1
50 TABLET, EXTENDED RELEASE ORAL DAILY
Qty: 30 TABLET | Refills: 5 | Status: SHIPPED | OUTPATIENT
Start: 2021-11-03 | End: 2022-03-11 | Stop reason: SINTOL

## 2021-11-02 RX ORDER — LOSARTAN POTASSIUM 50 MG/1
50 TABLET ORAL DAILY
Status: DISCONTINUED | OUTPATIENT
Start: 2021-11-02 | End: 2021-11-02 | Stop reason: HOSPADM

## 2021-11-02 RX ORDER — ACETAMINOPHEN 325 MG/1
650 TABLET ORAL
Status: SHIPPED | COMMUNITY
Start: 2021-11-02

## 2021-11-02 RX ORDER — METOPROLOL SUCCINATE 25 MG/1
25 TABLET, EXTENDED RELEASE ORAL DAILY
Status: DISCONTINUED | OUTPATIENT
Start: 2021-11-02 | End: 2021-11-02 | Stop reason: HOSPADM

## 2021-11-02 RX ADMIN — CEFAZOLIN 2 G: 10 INJECTION, POWDER, FOR SOLUTION INTRAVENOUS at 03:09

## 2021-11-02 RX ADMIN — INSULIN LISPRO 4 UNITS: 100 INJECTION, SOLUTION INTRAVENOUS; SUBCUTANEOUS at 09:07

## 2021-11-02 RX ADMIN — CEFAZOLIN 2 G: 10 INJECTION, POWDER, FOR SOLUTION INTRAVENOUS at 09:22

## 2021-11-02 RX ADMIN — ALOGLIPTIN 25 MG: 25 TABLET, FILM COATED ORAL at 09:07

## 2021-11-02 RX ADMIN — INSULIN LISPRO 4 UNITS: 100 INJECTION, SOLUTION INTRAVENOUS; SUBCUTANEOUS at 10:52

## 2021-11-02 RX ADMIN — Medication 10 ML: at 06:43

## 2021-11-02 RX ADMIN — OXYCODONE HYDROCHLORIDE AND ACETAMINOPHEN 500 MG: 500 TABLET ORAL at 09:07

## 2021-11-02 RX ADMIN — METOPROLOL SUCCINATE 25 MG: 25 TABLET, EXTENDED RELEASE ORAL at 06:41

## 2021-11-02 RX ADMIN — Medication 10 ML: at 06:44

## 2021-11-02 RX ADMIN — FLUTICASONE PROPIONATE 2 SPRAY: 50 SPRAY, METERED NASAL at 09:08

## 2021-11-02 RX ADMIN — INSULIN GLARGINE 5 UNITS: 100 INJECTION, SOLUTION SUBCUTANEOUS at 09:07

## 2021-11-02 RX ADMIN — LOSARTAN POTASSIUM 50 MG: 50 TABLET, FILM COATED ORAL at 06:41

## 2021-11-02 RX ADMIN — LEVOTHYROXINE, LIOTHYRONINE 30 MG: 38; 9 TABLET ORAL at 06:41

## 2021-11-02 RX ADMIN — EZETIMIBE 10 MG: 10 TABLET ORAL at 09:07

## 2021-11-02 RX ADMIN — ONDANSETRON 4 MG: 2 INJECTION INTRAMUSCULAR; INTRAVENOUS at 03:14

## 2021-11-02 NOTE — DISCHARGE SUMMARY
[facility] Cardiology Discharge Summary     Patient ID:  Ofelia Oreilly  [id number]  66 y.o.  1950    Admit date: 10/30/2021    Discharge date and time:  11/02/21     Admitting Physician: Edgar Lucero MD     Discharge Physician: Janeth De La Fuente NP/Dr. 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 ([facility]    Symptomatic bradycardia    Hyperlipidemia associated with type 2 diabetes mellitus ([facility]    Statin intolerance    Anxiety    Major depressive disorder with single episode    Age-related nuclear cataract of both eyes    Dry eye syndrome, bilateral    Primary hypothyroidism    DM (diabetes mellitus) ([facility]    HTN (hypertension)    IBS (irritable bowel syndrome)    Sjogren's syndrome [facility])       Cardiology Procedures this admission:  Pacemaker Implantation, CXR  Consults: none    Hospital Course: Pt was admitted with Sick Sinus Syndrome and new onset A Fib from [facility] as a transfer. Pt was scheduled for an AM Admission PM implantation at [facility] on 11/1/2021 after echo showed normal EF with no major valvular problems noted. Pt came in to [facility] and was taken to the cath lab by Dr. Camryn Edmonds. Pt received a [employer] pacemaker without complication. Follow up CXR showed no pneumothorax. Pt tolerated the procedure well and was taken to the telemetry floor for recovery. With tachycardia post procedure follow up limited echo showed the following:    Pericardium: Trivial circumferential pericardial effusion. LV: Estimated LVEF is 60 - 65%. Normal cavity size, wall thickness and systolic function (ejection fraction normal). Right Ventricle: Normal cavity size, wall thickness and global systolic function. The following morning Pt was up feeling well without any complaints of CP, SOB or palpitations. Pt's left subclavian PM site was clean, dry and intact without hematoma. Pt's labs were WNL.  Pt was seen and examined by Dr. Vero Barnes and determined stable and ready for discharge. Pt was instructed to follow PM discharge instructions given by nursing staff. The patient BB was increased and she was started on Eliquis as well for new onset A Fib and she will restart tomorrow morning. With HTN the patient had increased in BB therapy after implantation of PPM device. The patient will follow up with Dr Vero Barnes and the [address] [place] [address] per his request.    DISPOSITION: The patient is being discharged home in stable condition on a low saturated fat, low cholesterol and low salt diet. Pt is instructed to advance activities as tolerated limited to fatigue or shortness of breath. Pt is instructed not to lift anything over 5-10 lbs with affected arm. No pushing or pulling or lifting arm above shoulder. See complete discharge instructions. Pt is instructed to watch PM site for bleeding/oozing, if seen Pt is instructed to apply firm pressure with clean cloth and call office at [phone]. Pt is instructed to watch for signs of infection which include increasing area of redness around site, fever/hot to touch or purulent drainage. Pt is instructed to call office or return to ER for immediate evaluation of any shortness of breath, chest pain or palpitations. Discharge Exam:   Visit Vitals  BP (!) 154/70   Pulse (!) 101   Temp 99.1 °F (37.3 °C) Comment: RN WILMA NOTIFIED   Resp 20   Ht 5' (1.524 m)   Wt 59 kg (130 lb)   SpO2 96%   BMI 25.39 kg/m²   Pt has been seen by Vero Barnes MD: see his progress note for exam details.     Recent Results (from the past 24 hour(s))   GLUCOSE, POC    Collection Time: 11/01/21 11:33 AM   Result Value Ref Range    Glucose (POC) 175 (H) 65 - 100 mg/dL    Performed by EmeliaTabisi    EKG, 12 LEAD, INITIAL    Collection Time: 11/01/21  3:40 PM   Result Value Ref Range    Ventricular Rate 87 BPM    Atrial Rate 87 BPM    P-R Interval 168 ms    QRS Duration 68 ms    Q-T Interval 376 ms    QTC Calculation (Bezet) 452 ms    Calculated P Axis 49 degrees    Calculated R Axis -6 degrees    Calculated T Axis 45 degrees    Diagnosis       Normal sinus rhythm  Normal ECG  No previous ECGs available  Confirmed by ERIKA DIXON ()DARRYL ([id number]) on 11/1/2021 4:01:15 PM     GLUCOSE, POC    Collection Time: 11/01/21  3:59 PM   Result Value Ref Range    Glucose (POC) 193 (H) 65 - 100 mg/dL    Performed by Lamar    GLUCOSE, POC    Collection Time: 11/01/21  8:26 PM   Result Value Ref Range    Glucose (POC) 206 (H) 65 - 100 mg/dL    [facility] by [facility]    METABOLIC PANEL, BASIC    Collection Time: 11/02/21  3:35 AM   Result Value Ref Range    Sodium 132 (L) 136 - 145 mmol/L    Potassium 3.7 3.5 - 5.1 mmol/L    Chloride 104 98 - 107 mmol/L    CO2 17 (L) 21 - 32 mmol/L    Anion gap 11 7 - 16 mmol/L    Glucose 244 (H) 65 - 100 mg/dL    BUN 16 8 - 23 MG/DL    Creatinine 0.74 0.6 - 1.0 MG/DL    GFR est AA >60 >60 ml/min/1.73m2    GFR est non-AA >60 >60 ml/min/1.73m2    Calcium 8.3 8.3 - 10.4 MG/DL   MAGNESIUM    Collection Time: 11/02/21  3:35 AM   Result Value Ref Range    Magnesium 1.9 1.8 - 2.4 mg/dL   CBC W/O DIFF    Collection Time: 11/02/21  3:35 AM   Result Value Ref Range    WBC 9.1 4.3 - 11.1 K/uL    RBC 4.57 4.05 - 5.2 M/uL    HGB 13.4 11.7 - 15.4 g/dL    HCT 39.8 35.8 - 46.3 %    MCV 87.1 79.6 - 97.8 FL    MCH 29.3 26.1 - 32.9 PG    MCHC 33.7 31.4 - 35.0 g/dL    RDW 11.9 11.9 - 14.6 %    PLATELET 648 851 - 978 K/uL    MPV 10.1 9.4 - 12.3 FL    ABSOLUTE NRBC 0.00 0.0 - 0.2 K/uL   GLUCOSE, POC    Collection Time: 11/02/21  6:33 AM   Result Value Ref Range    Glucose (POC) 239 (H) 65 - 100 mg/dL    Performed by Satnam          Patient Instructions:   Current Discharge Medication List        START taking these medications    Details   metoprolol succinate (TOPROL-XL) 50 mg XL tablet Take 1 Tablet by mouth daily.   Qty: 30 Tablet, Refills: 5      apixaban (ELIQUIS) 5 mg tablet Take 1 Tablet by mouth two (2) times a day.  Qty: 60 Tablet, Refills: 5      acetaminophen (TYLENOL) 325 mg tablet Take 2 Tablets by mouth every four (4) hours as needed. CONTINUE these medications which have NOT CHANGED    Details   meclizine (ANTIVERT) 25 mg tablet Take 2 mg by mouth as needed. thyroid, Pork, ([place] Thyroid) 30 mg tablet Take 1 Tablet by mouth daily. Qty: 90 Tablet, Refills: 0    Associated Diagnoses: Primary hypothyroidism      zinc sulfate (ZINC-15 PO) Take  by mouth. ascorbic acid, vitamin C, (Vitamin C) 500 mg tablet Take  by mouth. selenium 200 mcg cap Take  by mouth. SKULLCAP PO Take  by mouth. VITAMIN A PO Take  by mouth. cholecalciferol, vitamin D3, (VITAMIN D3) 2,000 unit tab Take 5,000 Units by mouth. FLAXSEED PO Take  by mouth. turmeric (CURCUMIN) by Does Not Apply route. dietary supplement cap Take  by mouth daily. Indications: AD-Pro Vit A & D      ezetimibe (ZETIA) 10 mg tablet Take 1 Tablet by mouth daily. Indications: high cholesterol  Qty: 90 Tablet, Refills: 0    Associated Diagnoses: Hyperlipidemia associated with type 2 diabetes mellitus (HCC)      losartan (COZAAR) 25 mg tablet Take 1 Tablet by mouth daily. Indications: high blood pressure  Qty: 90 Tablet, Refills: 0    Associated Diagnoses: Essential hypertension      pioglitazone (ACTOS) 30 mg tablet Take 1 Tablet by mouth nightly. Indications: type 2 diabetes mellitus  Qty: 90 Tablet, Refills: 0    Associated Diagnoses: Type 2 diabetes mellitus with hyperglycemia, without long-term current use of insulin (HCC)      linaGLIPtin (TRADJENTA) 5 mg tablet Take 1 Tablet by mouth daily. Indications: type 2 diabetes mellitus  Qty: 90 Tablet, Refills: 0    Associated Diagnoses: Type 2 diabetes mellitus with hyperglycemia, without long-term current use of insulin ([facility])      colestipoL (COLESTID) 1 gram tablet Take 1 Tab by mouth two (2) times a day.   Qty: 60 Tab, Refills: 0    Associated Diagnoses: Acute diarrhea multivitamin (ONE A DAY) tablet Take 1 Tab by mouth daily. !! OTHER,NON-FORMULARY, Recovery metabollc rescue      !! OTHER,NON-FORMULARY, Total restore      Blood-Glucose Meter monitoring kit 1 glucometer, use as directed  Qty: 1 Kit, Refills: 0    Associated Diagnoses: Type 2 diabetes mellitus without complication, without long-term current use of insulin (HCC)      glucose blood VI test strips (blood glucose test) strip 1 strip daily  Qty: 100 Strip, Refills: 1    Associated Diagnoses: Type 2 diabetes mellitus without complication, without long-term current use of insulin ([facility])      montelukast (SINGULAIR) 10 mg tablet Take 1 Tab by mouth nightly. Indications: inflammation of the nose due to an allergy  Qty: 90 Tab, Refills: 1    Associated Diagnoses: Seasonal allergic rhinitis due to other allergic trigger      fluticasone propionate (FLONASE) 50 mcg/actuation nasal spray 2 Sprays by Both Nostrils route daily. Qty: 1 Bottle, Refills: 5    Associated Diagnoses: Seasonal allergic rhinitis due to other allergic trigger      !! OTHER,NON-FORMULARY, 1 Tab daily. Indications: Lectin Shield, intestinal health      vitamin E acetate (VITAMIN E PO) Take  by mouth. !! - Potential duplicate medications found. Please discuss with provider. STOP taking these medications       ibuprofen (MOTRIN) 800 mg tablet Comments:   Reason for Stopping:                 Signed:  Philippe Kendall NP  11/2/2021  9:53 AM  ATTENDING ADDENDUM:    Patient seen and examined by me. Agree with above note by physician extender. Key findings are:  No CP or JEFFERS, pacer site and interrogation look good today. Ready to go. CV- RRR without murmur  Lungs- Clear bilaterally  Abd- soft, nontender, nondistended  Ext- no edema, pacer site CDI    Plan: As above.     Nola Ramírez MD  [facility] Cardiology  Pager [phone]

## 2021-11-02 NOTE — ROUTINE PROCESS
Discharge instructions reviewed with patient. Prescriptions given for all new medications with Info sheets. Opportunity for questions provided. Patient voiced understanding of all discharge instructions. IVs removed. Heart monitor returned to monitor room. Pt waiting on her  to arrive.

## 2021-11-02 NOTE — PROGRESS NOTES
Problem: Diabetes Self-Management  Goal: *Disease process and treatment process  Description: Define diabetes and identify own type of diabetes; list 3 options for treating diabetes. Outcome: Progressing Towards Goal     Problem: Patient Education: Go to Patient Education Activity  Goal: Patient/Family Education  Outcome: Progressing Towards Goal     Problem: Falls - Risk of  Goal: *Absence of Falls  Description: Document Travis Mitchell Fall Risk and appropriate interventions in the flowsheet.   Outcome: Progressing Towards Goal  Note: Fall Risk Interventions:  Mobility Interventions: Bed/chair exit alarm, Patient to call before getting OOB         Medication Interventions: Teach patient to arise slowly    Elimination Interventions: Bed/chair exit alarm, Call light in reach    History of Falls Interventions: Bed/chair exit alarm, Room close to nurse's station         Problem: Patient Education: Go to Patient Education Activity  Goal: Patient/Family Education  Outcome: Progressing Towards Goal     Problem: Pain  Goal: *Control of Pain  Outcome: Progressing Towards Goal  Goal: *PALLIATIVE CARE:  Alleviation of Pain  Outcome: Progressing Towards Goal     Problem: Patient Education: Go to Patient Education Activity  Goal: Patient/Family Education  Outcome: Progressing Towards Goal     Problem: Pacer/ICD: Post-Procedure  Goal: Off Pathway (Use only if patient is Off Pathway)  Outcome: Progressing Towards Goal  Goal: Activity/Safety  Outcome: Progressing Towards Goal  Goal: Consults, if ordered  Outcome: Progressing Towards Goal  Goal: Diagnostic Test/Procedures  Outcome: Progressing Towards Goal  Goal: Nutrition/Diet  Outcome: Progressing Towards Goal  Goal: Treatments/Interventions/Procedures  Outcome: Progressing Towards Goal  Goal: Psychosocial  Outcome: Progressing Towards Goal  Goal: *Hemodynamically stable  Outcome: Progressing Towards Goal  Goal: *Absence of signs and symptoms of infection or wound complication  Outcome: Progressing Towards Goal

## 2021-11-02 NOTE — DIABETES MGMT
Patient admitted with symptomatic bradycardia. Blood glucose ranged 175-214 yesterday with patient receiving Lantus 5 units daily, Humalog correctional insulin, and Alogliptin 25g. Blood glucose this morning was 239. Most recent FSBS 217. Reviewed patient current regimen: Lantus 5 units daily, Humalog correctional insulin, and Alogliptin 25mg. Spoke with provider plan is for patient to discharge on oral diabetes medications and follow up with PCP.

## 2021-11-02 NOTE — PROGRESS NOTES
Pt with discharge orders this day. Chart screened by  for discharge planning. No CM needs identified at time of discharge. Milestones met.      Care Management Interventions  Transition of Care Consult (CM Consult): Discharge Planning  Support Systems: Spouse/Significant Other  Discharge Location  Discharge Placement: Home

## 2021-11-02 NOTE — PROGRESS NOTES
Pt became nauseous and vomited 120 oz of yellow bile fluid. Zofran 4mg given to pt. Will continue to monitor pt.

## 2021-11-02 NOTE — DISCHARGE INSTRUCTIONS
DISPOSITION: The patient is being discharged home in stable condition on a low saturated fat, low cholesterol and low salt diet. Pt is instructed to advance activities as tolerated limited to fatigue or shortness of breath. Pt is instructed not to lift anything over 5-10 lbs with affected arm. No pushing or pulling or lifting arm above shoulder. See complete discharge instructions. Pt is instructed to watch PM site for bleeding/oozing, if seen Pt is instructed to apply firm pressure with clean cloth and call office at [phone]. Pt is instructed to watch for signs of infection which include increasing area of redness around site, fever/hot to touch or purulent drainage. Pt is instructed to call office or return to ER for immediate evaluation of any shortness of breath, chest pain or palpitations.

## 2021-11-02 NOTE — DIABETES MGMT
Patient admitted with symptomatic bradycardia s/p [employer] pacemaker. Patient has been discharged. History of retinopathy, hyperlipidemia, anxiety, major depressive disorder, HTN, IBS, and hypothyroidism. Patient states that she was diagnosed with diabetes about 20-25 years ago and has a positive family history. Patient states that prior to admission medications include Metformin 500 mg bid. Pt states that she has taken Actos and Januvia in the past, but didn't like the side effects and stopped taking them. HbA1c 9.8%. Pt states that she has a working blood glucose meter at home and monitors her blood glucose 2x/week. Discussed target goals for blood glucose and HbA1c and the significance of an Hba1c of 9.8% (eA). Discussed the relationship between hyperglycemia and infection. Recommended monitoring bid until follow up with her PCP and to record in log book to bring to PCP appointments. Patient given educational material, \"Diabetes Self-Management: A Patient Teaching Guide\", which was reviewed with patient. Explained basic physiology of diabetes, as well as causes, signs and symptoms, and treatments for hypoglycemia and hyperglycemia. Described the effects of poor glycemic control and the development of long-term complications such as renal, eye, nerve, and cardiovascular disease. Described proper diabetic foot care and the importance of checking feet daily. Reviewed effects of sweetened beverages on glycemic control. Patient states that she only drinks unsweetened beverages. Educated re: effects of carbohydrates on blood glucose, the \"plate method\" of healthy meal planning, basics of healthy meal plan, and Consistent Carbohydrate Diet. Explained the relationship between hyperglycemia and infection and delayed healing. Educated patient regarding diabetic medications including mechanism of action, timing, and possible side effects. Patient verbalizes understanding of teaching.   Stressed the importance pf follow up care with PCP regarding diabetes management and HbA1c of 9.8%. Recommended that pt call PCP if blood glucose levels are above 180. Pt would likely benefit from further outpatient diabetes education. Referral has been initiated to [facility] and pt is agreeable to plan. Offered to provide insulin instruction. Pt states that she has given herself allergy injections in the past and would prefer to discuss with her primary care provider. Pt has no further questions regarding diabetes management at this time.

## 2021-11-08 NOTE — PROGRESS NOTES
Physician Progress Note      PATIENTDenise Mcallister  [facility] #:                  [id number]  :                       1950  ADMIT DATE:       10/30/2021 6:43 PM  Tye Felton DATE:        2021 12:36 PM  RESPONDING  PROVIDER #:        Mickie Mayorga MD          QUERY TEXT:    Patient admitted with symptomatic bradycardia, noted to have new onset atrial fibrillation and is maintained on Eliquis. If possible, please document in progress notes and discharge summary if you are evaluating and/or treating any of the following: The medical record reflects the following:  Risk Factors: Age > 72, HTN, DM  Clinical Indicators: Age 66, /100, , \"[id number] Score for Atrial Fibrillation \"  Treatment: Eliquis 5 mg 2 times a day  Options provided:  -- Secondary hypercoagulable state in a patient with atrial fibrillation  -- Other - I will add my own diagnosis  -- Disagree - Not applicable / Not valid  -- Disagree - Clinically unable to determine / Unknown  -- Refer to Clinical Documentation Reviewer    PROVIDER RESPONSE TEXT:    This patient has secondary hypercoagulable state related to atrial fibrillation.     Query created by: Greer Hunt on 2021 8:19 AM      Electronically signed by:  Mickie Mayorga MD 2021 9:41 AM

## 2021-11-16 PROBLEM — M54.50 ACUTE MIDLINE LOW BACK PAIN WITHOUT SCIATICA: Status: ACTIVE | Noted: 2021-11-16

## 2021-11-16 PROBLEM — Z95.0 PACEMAKER: Status: ACTIVE | Noted: 2021-11-16

## 2021-11-16 PROBLEM — G44.309 POST-TRAUMATIC HEADACHE: Status: ACTIVE | Noted: 2021-11-16

## 2021-11-24 ENCOUNTER — HOSPITAL ENCOUNTER (OUTPATIENT)
Dept: CT IMAGING | Age: 71
Discharge: HOME | End: 2021-11-24
Attending: INTERNAL MEDICINE
Payer: MEDICARE

## 2021-11-24 DIAGNOSIS — I10 ESSENTIAL HYPERTENSION: ICD-10-CM

## 2021-11-24 DIAGNOSIS — G44.329 CHRONIC POST-TRAUMATIC HEADACHE, NOT INTRACTABLE: ICD-10-CM

## 2021-11-24 PROCEDURE — 70450 CT HEAD/BRAIN W/O DYE: CPT

## 2021-12-09 ENCOUNTER — HOSPITAL ENCOUNTER (OUTPATIENT)
Dept: LAB | Age: 71
Discharge: HOME | End: 2021-12-09
Payer: MEDICARE

## 2021-12-09 DIAGNOSIS — I10 ESSENTIAL HYPERTENSION: ICD-10-CM

## 2021-12-09 LAB
ANION GAP SERPL CALC-SCNC: 5 MMOL/L (ref 7–16)
BUN SERPL-MCNC: 16 MG/DL (ref 8–23)
CALCIUM SERPL-MCNC: 9.8 MG/DL (ref 8.3–10.4)
CHLORIDE SERPL-SCNC: 101 MMOL/L (ref 98–107)
CO2 SERPL-SCNC: 27 MMOL/L (ref 21–32)
CREAT SERPL-MCNC: 0.98 MG/DL (ref 0.6–1)
GLUCOSE SERPL-MCNC: 262 MG/DL (ref 65–100)
POTASSIUM SERPL-SCNC: 4.4 MMOL/L (ref 3.5–5.1)
SODIUM SERPL-SCNC: 133 MMOL/L (ref 136–145)

## 2021-12-09 PROCEDURE — 36415 COLL VENOUS BLD VENIPUNCTURE: CPT

## 2021-12-09 PROCEDURE — 80048 BASIC METABOLIC PNL TOTAL CA: CPT

## 2021-12-16 ENCOUNTER — HOSPITAL ENCOUNTER (OUTPATIENT)
Dept: MRI IMAGING | Age: 71
Discharge: HOME | End: 2021-12-16
Attending: FAMILY MEDICINE
Payer: MEDICARE

## 2021-12-16 ENCOUNTER — HOSPITAL ENCOUNTER (OUTPATIENT)
Dept: MRI IMAGING | Age: 71
Discharge: HOME | End: 2021-12-16
Attending: PHYSICIAN ASSISTANT
Payer: MEDICARE

## 2021-12-16 DIAGNOSIS — R41.3 MEMORY IMPAIRMENT: ICD-10-CM

## 2021-12-16 DIAGNOSIS — M54.6 THORACIC SPINE PAIN: ICD-10-CM

## 2021-12-16 DIAGNOSIS — S22.050A COMPRESSION FRACTURE OF T6 VERTEBRA, INITIAL ENCOUNTER (HCC): ICD-10-CM

## 2021-12-16 DIAGNOSIS — M54.6 ACUTE MIDLINE THORACIC BACK PAIN: ICD-10-CM

## 2021-12-16 PROCEDURE — 72146 MRI CHEST SPINE W/O DYE: CPT

## 2021-12-16 PROCEDURE — 70551 MRI BRAIN STEM W/O DYE: CPT

## 2021-12-16 NOTE — PROGRESS NOTES
She has some changes that are consistent with encephalopathy. But could also be signs of small strokes in the past.  The radiologist is recommending another MRI of the brain in about 3 months to see if these lesions resolve or not. The MRI does show some evidence of some fluid buildup in the sinuses. If she is having any sinusitis symptoms I recommend she schedule an appointment.

## 2022-02-18 ENCOUNTER — TRANSCRIBE ORDER (OUTPATIENT)
Dept: SCHEDULING | Age: 72
End: 2022-02-18

## 2022-02-18 DIAGNOSIS — C50.912 MALIGNANT NEOPLASM OF LEFT FEMALE BREAST, UNSPECIFIED ESTROGEN RECEPTOR STATUS, UNSPECIFIED SITE OF BREAST (HCC): Primary | ICD-10-CM

## 2022-02-18 DIAGNOSIS — Z12.31 SCREENING MAMMOGRAM FOR HIGH-RISK PATIENT: Primary | ICD-10-CM

## 2022-03-01 ENCOUNTER — NURSE TRIAGE (OUTPATIENT)
Dept: OTHER | Facility: CLINIC | Age: 72
End: 2022-03-01

## 2022-03-01 NOTE — TELEPHONE ENCOUNTER
Received call from [facility] with Red Flag Complaint. Subjective: Caller states \"feeling off today and sugars are up\"     Current Symptoms: HX short term memory loss for several years. Woke up this morning feeling shaky. Checked glucose this morning, 150. Normal ranges from 170 to 116 fasting over the last several days. Just started insulin therapy 2mo ago. No dizziness, no HA. Goal fasting glucose below 120 with direction to increase by 2 units until goal reached. Stopped at 18units after 120 goal reached. . Eating breakfast while talking to RN. No dizziness, HA or nausea. Denies any other complaints. Onset: Today    Associated Symptoms: NA    Pain Severity: N/A    Temperature: Denies - chill last night    What has been tried: Eating breakfast    LMP: NA Pregnant: NA    Recommended disposition: Home Care - Pt would like a call back from PCP office to discuss Insulin and Goal ranges. Care advice provided, patient verbalizes understanding; denies any other questions or concerns; instructed to call back for any new or worsening symptoms. Spoke with Kassie Dobbins at [facility] who will have care team contact patient for further discussion on insulin    Attention Provider: Thank you for allowing me to participate in the care of your patient. The patient was connected to triage in response to information provided to the ECC. Please do not respond through this encounter as the response is not directed to a shared pool.       Reason for Disposition   Blood glucose  mg/dL (3.9 -13.3 mmol/L)    Protocols used: DIABETES - HIGH BLOOD SUGAR-ADULT-OH

## 2022-03-06 ENCOUNTER — HOSPITAL ENCOUNTER (OUTPATIENT)
Dept: SLEEP MEDICINE | Age: 72
Discharge: HOME | End: 2022-03-06
Payer: MEDICARE

## 2022-03-06 PROCEDURE — 95810 POLYSOM 6/> YRS 4/> PARAM: CPT

## 2022-03-08 ENCOUNTER — HOSPITAL ENCOUNTER (EMERGENCY)
Age: 72
Discharge: HOME | End: 2022-03-08
Attending: STUDENT IN AN ORGANIZED HEALTH CARE EDUCATION/TRAINING PROGRAM
Payer: MEDICARE

## 2022-03-08 ENCOUNTER — APPOINTMENT (OUTPATIENT)
Dept: GENERAL RADIOLOGY | Age: 72
End: 2022-03-08
Attending: EMERGENCY MEDICINE
Payer: MEDICARE

## 2022-03-08 VITALS
RESPIRATION RATE: 20 BRPM | BODY MASS INDEX: 22.09 KG/M2 | HEIGHT: 61 IN | SYSTOLIC BLOOD PRESSURE: 175 MMHG | TEMPERATURE: 98.4 F | HEART RATE: 84 BPM | DIASTOLIC BLOOD PRESSURE: 87 MMHG | WEIGHT: 117 LBS | OXYGEN SATURATION: 98 %

## 2022-03-08 DIAGNOSIS — R07.89 ATYPICAL CHEST PAIN: Primary | ICD-10-CM

## 2022-03-08 LAB
ALBUMIN SERPL-MCNC: 3.9 G/DL (ref 3.2–4.6)
ALBUMIN/GLOB SERPL: 1 {RATIO} (ref 1.2–3.5)
ALP SERPL-CCNC: 65 U/L (ref 50–136)
ALT SERPL-CCNC: 26 U/L (ref 12–65)
ANION GAP SERPL CALC-SCNC: 7 MMOL/L (ref 7–16)
AST SERPL-CCNC: 21 U/L (ref 15–37)
BASOPHILS # BLD: 0.1 K/UL (ref 0–0.2)
BASOPHILS NFR BLD: 1 % (ref 0–2)
BILIRUB SERPL-MCNC: 0.3 MG/DL (ref 0.2–1.1)
BUN SERPL-MCNC: 16 MG/DL (ref 8–23)
CALCIUM SERPL-MCNC: 9.4 MG/DL (ref 8.3–10.4)
CHLORIDE SERPL-SCNC: 101 MMOL/L (ref 98–107)
CO2 SERPL-SCNC: 28 MMOL/L (ref 21–32)
CREAT SERPL-MCNC: 0.9 MG/DL (ref 0.6–1)
DIFFERENTIAL METHOD BLD: ABNORMAL
EOSINOPHIL # BLD: 0.2 K/UL (ref 0–0.8)
EOSINOPHIL NFR BLD: 2 % (ref 0.5–7.8)
ERYTHROCYTE [DISTWIDTH] IN BLOOD BY AUTOMATED COUNT: 11.8 % (ref 11.9–14.6)
GLOBULIN SER CALC-MCNC: 3.8 G/DL (ref 2.3–3.5)
GLUCOSE SERPL-MCNC: 258 MG/DL (ref 65–100)
HCT VFR BLD AUTO: 42.7 % (ref 35.8–46.3)
HGB BLD-MCNC: 14 G/DL (ref 11.7–15.4)
IMM GRANULOCYTES # BLD AUTO: 0 K/UL (ref 0–0.5)
IMM GRANULOCYTES NFR BLD AUTO: 0 % (ref 0–5)
LIPASE SERPL-CCNC: 188 U/L (ref 73–393)
LYMPHOCYTES # BLD: 2.9 K/UL (ref 0.5–4.6)
LYMPHOCYTES NFR BLD: 32 % (ref 13–44)
MAGNESIUM SERPL-MCNC: 2.2 MG/DL (ref 1.8–2.4)
MCH RBC QN AUTO: 28.7 PG (ref 26.1–32.9)
MCHC RBC AUTO-ENTMCNC: 32.8 G/DL (ref 31.4–35)
MCV RBC AUTO: 87.7 FL (ref 79.6–97.8)
MONOCYTES # BLD: 0.4 K/UL (ref 0.1–1.3)
MONOCYTES NFR BLD: 4 % (ref 4–12)
NEUTS SEG # BLD: 5.3 K/UL (ref 1.7–8.2)
NEUTS SEG NFR BLD: 60 % (ref 43–78)
NRBC # BLD: 0 K/UL (ref 0–0.2)
PLATELET # BLD AUTO: 311 K/UL (ref 150–450)
PMV BLD AUTO: 10.6 FL (ref 9.4–12.3)
POTASSIUM SERPL-SCNC: 3.8 MMOL/L (ref 3.5–5.1)
PROT SERPL-MCNC: 7.7 G/DL (ref 6.3–8.2)
RBC # BLD AUTO: 4.87 M/UL (ref 4.05–5.2)
SODIUM SERPL-SCNC: 136 MMOL/L (ref 136–145)
TROPONIN-HIGH SENSITIVITY: 4.1 PG/ML (ref 0–14)
WBC # BLD AUTO: 8.8 K/UL (ref 4.3–11.1)

## 2022-03-08 PROCEDURE — 93005 ELECTROCARDIOGRAM TRACING: CPT | Performed by: STUDENT IN AN ORGANIZED HEALTH CARE EDUCATION/TRAINING PROGRAM

## 2022-03-08 PROCEDURE — 71046 X-RAY EXAM CHEST 2 VIEWS: CPT

## 2022-03-08 PROCEDURE — 85025 COMPLETE CBC W/AUTO DIFF WBC: CPT

## 2022-03-08 PROCEDURE — 80053 COMPREHEN METABOLIC PANEL: CPT

## 2022-03-08 PROCEDURE — 83735 ASSAY OF MAGNESIUM: CPT

## 2022-03-08 PROCEDURE — 99285 EMERGENCY DEPT VISIT HI MDM: CPT

## 2022-03-08 PROCEDURE — 83690 ASSAY OF LIPASE: CPT

## 2022-03-08 PROCEDURE — 93005 ELECTROCARDIOGRAM TRACING: CPT | Performed by: EMERGENCY MEDICINE

## 2022-03-08 PROCEDURE — 84484 ASSAY OF TROPONIN QUANT: CPT

## 2022-03-08 RX ORDER — SODIUM CHLORIDE 0.9 % (FLUSH) 0.9 %
5-10 SYRINGE (ML) INJECTION AS NEEDED
Status: DISCONTINUED | OUTPATIENT
Start: 2022-03-08 | End: 2022-03-08 | Stop reason: HOSPADM

## 2022-03-08 RX ORDER — SODIUM CHLORIDE 0.9 % (FLUSH) 0.9 %
5-10 SYRINGE (ML) INJECTION EVERY 8 HOURS
Status: DISCONTINUED | OUTPATIENT
Start: 2022-03-08 | End: 2022-03-08 | Stop reason: HOSPADM

## 2022-03-08 NOTE — ED NOTES
First encounter with pt assumed care.  Patient ambulated steadily and independently to room 4 in ED, changed into gown, awaiting ed provider evaluation at this time

## 2022-03-08 NOTE — ED NOTES
I have reviewed discharge instructions with the patient. The spouse verbalized understanding. Patient left ED via Discharge Method: ambulatory to Home with     Opportunity for questions and clarification provided. Patient given 0 scripts. To continue your aftercare when you leave the hospital, you may receive an automated call from our care team to check in on how you are doing. This is a free service and part of our promise to provide the best care and service to meet your aftercare needs.  If you have questions, or wish to unsubscribe from this service please call [phone]. Thank you for Choosing our [facility] Emergency Department.

## 2022-03-08 NOTE — ED TRIAGE NOTES
Patient arrives ambulatory to triage with mask in place. Patient reports onset of chest pain that began 3 days ago. Reports pain that feels like a squeezing band around chest.  Denies sob. Saw cardiology last week.

## 2022-03-08 NOTE — DISCHARGE INSTRUCTIONS
Take Tylenol if your chest pain returns. Follow-up with primary care physician as well as cardiology within 1 week. Call to make these appointments. Return to the emergency department for worsening or worrisome symptoms.

## 2022-03-08 NOTE — ED PROVIDER NOTES
69-year-old female history of atrial fibrillation presents to the emerge department complaining of chest and back pain which began earlier today. History of sick sinus syndrome for which she has a pacemaker. Also is compliant on her Eliquis. States naproxen 11 AM this morning while at rest, eating her breakfast, she began to have back pain that radiated to her chest around her torso in a bandlike pattern. Described as a squeezing sensation states it was 8/10 in severity. Did not radiate. No other associated symptoms. No improving or worsening factors. No exertional component. States the chest pain improved on its own but lasted for 1 hour at 8/10 in severity. States the chest pain then was mild for 4-5 more hours. Reports it has resolved since being here in the emergency department. Again denies any history of CAD.            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 ([address]     Sjogren's syndrome ([address]     dry eye and fatigue are her symptoms    Sleep apnea     patient does not use a c-pap    SSS (sick sinus syndrome) ([address]     TBI (traumatic brain injury) ([address]     Thyroid disease     hypothyroidism       Past Surgical History:   Procedure Laterality Date    COLONOSCOPY N/A 5/5/2017    COLONOSCOPY/ 23 performed by Raymond Richmond MD at [facility] ENDOSCOPY    HX LAP CHOLECYSTECTOMY  11/2007    HX ORTHOPAEDIC      '18 laser treaatment lumber disc herniation    HX PACEMAKER  11/01/2021    HX RETINAL DETACHMENT REPAIR Right 09/2021    HX TUBAL LIGATION  1976         Family History:   Problem Relation Age of Onset    Heart Disease Father    Themichi Milder Stroke Father     Cancer Father         [place]    Heart Disease Brother     Diabetes Brother         DMII    Cancer Paternal Grandfather         [place]    Cancer Paternal Aunt         ovarian    Breast Cancer Neg Hx        Social History     Socioeconomic History    Marital status:      Spouse name: Not on file    Number of children: Not on file    Years of education: Not on file    Highest education level: Not on file   Occupational History    Not on file   Tobacco Use    Smoking status: Never Smoker    Smokeless tobacco: Never Used   Vaping Use    Vaping Use: Never used   Substance and Sexual Activity    Alcohol use: No    Drug use: No    Sexual activity: Yes     Partners: Male   Other Topics Concern    Not on file   Social History Narrative    Not on file     Social Determinants of Health     Financial Resource Strain:     Difficulty of Paying Living Expenses: Not on file   Food Insecurity:     Worried About Running Out of Food in the Last Year: Not on file    Dequan of Food in the Last Year: Not on file   Transportation Needs:     Lack of Transportation (Medical): Not on file    Lack of Transportation (Non-Medical):  Not on file   Physical Activity:     Days of Exercise per Week: Not on file    Minutes of Exercise per Session: Not on file   Stress:     Feeling of Stress : Not on file   Social Connections:     Frequency of Communication with Friends and Family: Not on file    Frequency of Social Gatherings with Friends and Family: Not on file    Attends Taoism Services: Not on file    Active Member of Clubs or Organizations: Not on file    Attends Club or Organization Meetings: Not on file    Marital Status: Not on file   Intimate Partner Violence:     Fear of Current or Ex-Partner: Not on file    Emotionally Abused: Not on file    Physically Abused: Not on file    Sexually Abused: Not on file   Housing Stability:     Unable to Pay for Housing in the Last Year: Not on file    Number of Places Lived in the Last Year: Not on file    Unstable Housing in the Last Year: Not on file         ALLERGIES: Clindamycin, Amlodipine, Metformin, Amoxicillin, Cortisone, Methylcellulose, Statins-hmg-coa reductase inhibitors, Tradjenta [linagliptin], and Vit e acet-min oil-dimethicone    Review of Systems   Constitutional: Negative for chills and fever. HENT: Negative for sinus pressure and sore throat. Eyes: Negative for visual disturbance. Respiratory: Negative for cough and shortness of breath. Cardiovascular: Positive for chest pain. Gastrointestinal: Negative for abdominal pain, diarrhea, nausea and vomiting. Endocrine: Negative for polyuria. Genitourinary: Negative for difficulty urinating and dysuria. Musculoskeletal: Negative for neck pain and neck stiffness. Skin: Negative for rash. Neurological: Negative for weakness and headaches. Psychiatric/Behavioral: Negative for confusion. All other systems reviewed and are negative. Vitals:    03/08/22 1649 03/08/22 1748   BP: (!) 215/98    Pulse: 95    Resp: 16    Temp: 98.4 °F (36.9 °C)    SpO2: 98% 97%   Weight: 53.1 kg (117 lb)    Height: 5' 1\" (1.549 m)             Physical Exam  Vitals and nursing note reviewed. Constitutional:       Appearance: Normal appearance. She is not ill-appearing or toxic-appearing. HENT:      Head: Normocephalic and atraumatic. Nose: Nose normal.      Mouth/Throat:      Mouth: Mucous membranes are moist.   Eyes:      Extraocular Movements: Extraocular movements intact. Cardiovascular:      Rate and Rhythm: Normal rate and regular rhythm. Pulses: Normal pulses. Heart sounds: Normal heart sounds. Pulmonary:      Effort: Pulmonary effort is normal. No respiratory distress. Breath sounds: Normal breath sounds. Abdominal:      General: Abdomen is flat. There is no distension. Palpations: Abdomen is soft. Tenderness:  There is no abdominal tenderness. Musculoskeletal:         General: Normal range of motion. Cervical back: Normal range of motion. No rigidity. Skin:     General: Skin is warm and dry. Neurological:      General: No focal deficit present. Mental Status: She is alert and oriented to person, place, and time. Psychiatric:         Mood and Affect: Mood normal.          MDM  Number of Diagnoses or Management Options  Diagnosis management comments: 79-year-old female history of A. fib with pacemaker, compliant on Eliquis presents to the emerge department complaining of chest pain. Began 7 hours prior to arrival, not present currently. No history of CAD. Is established with cardiology. Lab work obtained approximately 5 PM, 6 hours after onset of her symptoms showed normal troponin, normal white count, stable H&H, normal electrolytes and kidney function, normal liver enzymes and lipase. Chest x-ray was normal.  Given the patient's chest pain is completely resolved and her initial troponin is normal.  Patient's chest pain has been present for approximately 6 hours prior to the initial troponin which resulted 4. I feel the patient is safe to discharge home with no additional work-up. 1 troponin rule ACS in the patient who has a history of CAD and had chest pain 6 hours prior to arrival.  Blood pressure was slightly elevated initially, has improved without interventions. She be discharged home with close outpatient follow-up. Patient given strict return precautions. Patient and family voiced understanding and agreement with this plan. EKG obtained shows sinus rhythm, rate 94, , QRS 84, QTc 467, normal axis, no significant ST elevation or depression.        Amount and/or Complexity of Data Reviewed  Clinical lab tests: reviewed and ordered  Tests in the radiology section of CPT®: ordered and reviewed  Independent visualization of images, tracings, or specimens: yes    Risk of Complications, Morbidity, and/or Mortality  Presenting problems: moderate  Diagnostic procedures: moderate  Management options: moderate           Procedures

## 2022-03-09 LAB
ATRIAL RATE: 94 BPM
CALCULATED P AXIS, ECG09: 57 DEGREES
CALCULATED R AXIS, ECG10: 43 DEGREES
CALCULATED T AXIS, ECG11: 10 DEGREES
DIAGNOSIS, 93000: NORMAL
P-R INTERVAL, ECG05: 190 MS
Q-T INTERVAL, ECG07: 374 MS
QRS DURATION, ECG06: 74 MS
QTC CALCULATION (BEZET), ECG08: 467 MS
VENTRICULAR RATE, ECG03: 94 BPM

## 2022-03-10 ENCOUNTER — HOSPITAL ENCOUNTER (OUTPATIENT)
Dept: MRI IMAGING | Age: 72
Discharge: HOME | End: 2022-03-10
Attending: PSYCHIATRY & NEUROLOGY
Payer: MEDICARE

## 2022-03-10 ENCOUNTER — HOSPITAL ENCOUNTER (OUTPATIENT)
Dept: CT IMAGING | Age: 72
Discharge: HOME | End: 2022-03-10
Attending: PSYCHIATRY & NEUROLOGY
Payer: MEDICARE

## 2022-03-10 DIAGNOSIS — I67.83 POSTERIOR REVERSIBLE ENCEPHALOPATHY SYNDROME: ICD-10-CM

## 2022-03-10 DIAGNOSIS — I63.9 CEREBROVASCULAR ACCIDENT (CVA), UNSPECIFIED MECHANISM (HCC): ICD-10-CM

## 2022-03-10 LAB — CREAT BLD-MCNC: 0.55 MG/DL (ref 0.8–1.5)

## 2022-03-10 PROCEDURE — 70553 MRI BRAIN STEM W/O & W/DYE: CPT

## 2022-03-10 PROCEDURE — 82565 ASSAY OF CREATININE: CPT

## 2022-03-10 PROCEDURE — A9576 INJ PROHANCE MULTIPACK: HCPCS | Performed by: PSYCHIATRY & NEUROLOGY

## 2022-03-10 PROCEDURE — 74011000258 HC RX REV CODE- 258: Performed by: PSYCHIATRY & NEUROLOGY

## 2022-03-10 PROCEDURE — 74011000636 HC RX REV CODE- 636: Performed by: PSYCHIATRY & NEUROLOGY

## 2022-03-10 PROCEDURE — 74011250636 HC RX REV CODE- 250/636: Performed by: PSYCHIATRY & NEUROLOGY

## 2022-03-10 PROCEDURE — 70498 CT ANGIOGRAPHY NECK: CPT

## 2022-03-10 RX ORDER — SODIUM CHLORIDE 0.9 % (FLUSH) 0.9 %
10 SYRINGE (ML) INJECTION
Status: COMPLETED | OUTPATIENT
Start: 2022-03-10 | End: 2022-03-10

## 2022-03-10 RX ADMIN — GADOTERIDOL 10 ML: 279.3 INJECTION, SOLUTION INTRAVENOUS at 12:04

## 2022-03-10 RX ADMIN — Medication 10 ML: at 11:22

## 2022-03-10 RX ADMIN — SODIUM CHLORIDE 100 ML: 9 INJECTION, SOLUTION INTRAVENOUS at 11:22

## 2022-03-10 RX ADMIN — IOPAMIDOL 50 ML: 755 INJECTION, SOLUTION INTRAVENOUS at 11:21

## 2022-03-10 RX ADMIN — Medication 10 ML: at 12:04

## 2022-03-14 ENCOUNTER — HOSPITAL ENCOUNTER (OUTPATIENT)
Dept: MAMMOGRAPHY | Age: 72
Discharge: HOME | End: 2022-03-14
Attending: FAMILY MEDICINE
Payer: MEDICARE

## 2022-03-14 DIAGNOSIS — Z12.31 ENCOUNTER FOR SCREENING MAMMOGRAM FOR MALIGNANT NEOPLASM OF BREAST: ICD-10-CM

## 2022-03-14 DIAGNOSIS — N64.4 BREAST PAIN, LEFT: ICD-10-CM

## 2022-03-14 PROCEDURE — 76642 ULTRASOUND BREAST LIMITED: CPT

## 2022-03-14 PROCEDURE — 77066 DX MAMMO INCL CAD BI: CPT

## 2022-03-18 PROBLEM — G44.309 POST-TRAUMATIC HEADACHE: Status: ACTIVE | Noted: 2021-11-16

## 2022-03-18 PROBLEM — H25.13 AGE-RELATED NUCLEAR CATARACT OF BOTH EYES: Status: ACTIVE | Noted: 2019-06-18

## 2022-03-18 PROBLEM — F41.9 ANXIETY: Status: ACTIVE | Noted: 2020-03-11

## 2022-03-19 PROBLEM — H04.123 DRY EYE SYNDROME, BILATERAL: Status: ACTIVE | Noted: 2019-06-18

## 2022-03-19 PROBLEM — Z78.9 STATIN INTOLERANCE: Status: ACTIVE | Noted: 2021-02-18

## 2022-03-19 PROBLEM — F32.9 MAJOR DEPRESSIVE DISORDER WITH SINGLE EPISODE: Status: ACTIVE | Noted: 2019-11-19

## 2022-03-19 PROBLEM — E11.69 HYPERLIPIDEMIA ASSOCIATED WITH TYPE 2 DIABETES MELLITUS (HCC): Status: ACTIVE | Noted: 2021-02-18

## 2022-03-19 PROBLEM — R00.1 SYMPTOMATIC BRADYCARDIA: Status: ACTIVE | Noted: 2021-10-30

## 2022-03-19 PROBLEM — Z95.0 PACEMAKER: Status: ACTIVE | Noted: 2021-11-16

## 2022-03-19 PROBLEM — E78.5 HYPERLIPIDEMIA ASSOCIATED WITH TYPE 2 DIABETES MELLITUS (HCC): Status: ACTIVE | Noted: 2021-02-18

## 2022-03-20 PROBLEM — M54.50 ACUTE MIDLINE LOW BACK PAIN WITHOUT SCIATICA: Status: ACTIVE | Noted: 2021-11-16

## 2022-04-12 ENCOUNTER — HOSPITAL ENCOUNTER (OUTPATIENT)
Dept: PHYSICAL THERAPY | Age: 72
Discharge: HOME | End: 2022-04-12
Payer: MEDICARE

## 2022-04-12 PROCEDURE — 97110 THERAPEUTIC EXERCISES: CPT

## 2022-04-12 PROCEDURE — 97161 PT EVAL LOW COMPLEX 20 MIN: CPT

## 2022-04-12 PROCEDURE — 97140 MANUAL THERAPY 1/> REGIONS: CPT

## 2022-04-12 NOTE — PROGRESS NOTES
Mandy Benson  : 1950  [place]  Secondary: [address] at [facility]  [address], [address], [place], [address]  [phone]   [phone]      OUTPATIENT PHYSICAL THERAPY: Daily Treatment Note 2022    ICD-10: Treatment Diagnosis: Left shoulder pain [M25.512]                Treatment Diagnosis 2: Primary osteoarthritis, left shoulder [M19.012]                Treatment Diagnosis 3: Left shoulder stiffness [M25.612]                 Treatment Diagnosis 4: Muscle weakness, generalized [M62.81]  Precautions: Pacemaker, h/o A-fib, osteoporosis, h/o T3/T6 spine fractures. Allergies: Clindamycin, Amlodipine, Metformin, Amoxicillin, Cortisone, Methylcellulose, Statins-hmg-coa reductase inhibitors, Tradjenta [linagliptin], and Vit e acet-min oil-dimethicone   TREATMENT PLAN:  Effective Dates: 2022 TO 2022. Frequency/Duration: 2 times a week for 8 weeks. MEDICAL/REFERRING DIAGNOSIS:  Pain in left shoulder [M25.512]  Primary osteoarthritis, left shoulder [M19.012]   DATE OF ONSET: Original injury from fall 10/2021. REFERRING PHYSICIAN: JUSTIN Huber MD Orders: Evaluate and Treat   Return MD Appointment: 2022. Pre-treatment Symptoms/Complaints:  Pt reports since shoulder injection pain levels have been minimal at rest and moderate with active use of LUE. Pain: Initial: Pain Intensity 1: 3  Pain Location 1: Shoulder  Pain Orientation 1: Left  Post Session:  2/10   Medications Last Reviewed:  2022  Updated Objective Findings:  See evaluation note from today   TREATMENT:   THERAPEUTIC EXERCISE: (23 minutes):  Exercises per grid below to improve mobility, strength and coordination. Required moderate visual, verbal, manual and tactile cues to promote proper body alignment, promote proper body posture and promote proper body mechanics.   Progressed resistance, range, repetitions and complexity of movement as indicated. Date:  4/12/2022 Date:   Date:     Activity/Exercise Parameters Parameters Parameters   Scap retract x20     Upper trap stretch [id number]     Shoulder flexion Supine with cane, x10     Shoulder ER Supine with cane, x10                         Time spent with patient reviewing proper muscle recruitment and technique with exercises. MANUAL THERAPY: (15 minutes): Joint mobilization, Soft tissue mobilization and PROM was utilized and necessary because of the patient's restricted joint motion, painful spasm, loss of articular motion and restricted motion of soft tissue   Soft tissue mobilization: Deltoid, upper trap, bicep.  Joint mobs: [address] joint AP grade II   PROM: all planes to tolerance. MODALITIES: (0 minutes):      None today. HEP: As above; handouts given to patient for all exercises. Treatment/Session Summary:    · Response to Treatment:  Good tolerance to manual and therex interentions. .  · Communication/Consultation:  HEP handout provided. · Equipment provided today:  None today  · Recommendations/Intent for next treatment session: Next visit will focus on pain control, improve left shoulder ROM, improve gh joint/scapulohumeral joint mobility, improve left shoulder strength/stability. Total Treatment Billable Duration:  58 minutes: 20 evaluation, 23 therex, 15 manual therapy.   PT Patient Time In/Time Out  Time In: 1330  Time Out: 1639  No Phelan, PT

## 2022-04-12 NOTE — THERAPY EVALUATION
Josiah De Anda  : 1950  [place]  Secondary: [facility] at [address], [address]  [phone]   [phone]       OUTPATIENT PHYSICAL THERAPY:Initial Assessment 2022    ICD-10: Treatment Diagnosis: Left shoulder pain [M25.512]                Treatment Diagnosis 2: Primary osteoarthritis, left shoulder [M19.012]                Treatment Diagnosis 3: Left shoulder stiffness [M25.612]                 Treatment Diagnosis 4: Muscle weakness, generalized [M62.81]  Precautions: Pacemaker, h/o A-fib, osteoporosis, h/o T3/T6 spine fractures. Allergies: Clindamycin, Amlodipine, Metformin, Amoxicillin, Cortisone, Methylcellulose, Statins-hmg-coa reductase inhibitors, Tradjenta [linagliptin], and Vit e acet-min oil-dimethicone   TREATMENT PLAN:  Effective Dates: 2022 TO 2022. Frequency/Duration: 2 times a week for 8 weeks. MEDICAL/REFERRING DIAGNOSIS:  Pain in left shoulder [M25.512]  Primary osteoarthritis, left shoulder [M19.012]   DATE OF ONSET: Original injury from fall 10/2021. REFERRING PHYSICIAN: JUSTIN Avalos MD Orders: Evaluate and Treat   Return MD Appointment: 2022. INITIAL ASSESSMENT:  Ms. Gen Farley is a 66 y.o. female presenting to physical therapy with complaints of left shoulder pain and stiffness secondary to MD diagnosis left shoulder/AC joint OA. Per pt original injury occurred from fall 2777 due to complications from A-fib. Per pt had pacemaker 2021. Pt does reports did experience a concussion from fall however has recovered from concussion symptoms fully at this time. Pt reports history of spinal fractures at T3 and T6 from fall which are being monitored by MD. Pt reports x-ray performed on let shoulder indicating AC joint OA. Pt denies numbness and tingling symptoms. Pt reports gets most relief from laying supine.  Primary complaints at this time include pain with reaching and lifting activities. Pt received injection to left shoulder at most recent MD visit which significantly reduced overall pain levels. Pt referred by MD for PT eval and treat. Pt will benefit from skilled PT treatment to address deficits in strength, AROM, and functional mobility in order to return to prior level of function and improve quality of life. PROBLEM LIST (Impacting functional limitations):  1. Decreased Strength  2. Decreased ADL/Functional Activities  3. Decreased Transfer Abilities  4. Increased Pain  5. Decreased Activity Tolerance  6. Increased Fatigue  7. Decreased Flexibility/Joint Mobility  8. Decreased [place] with Home Exercise Program INTERVENTIONS PLANNED: (Treatment may consist of any combination of the following)  1. Bed Mobility  2. Cold  3. Electrical Stimulation  4. Heat  5. Home Exercise Program (HEP)  6. Iontophoresis  7. Manual Therapy  8. Neuromuscular Re-education/Strengthening  9. Range of Motion (ROM)  10. Therapeutic Activites  11. Therapeutic Exercise/Strengthening  12. Transcutaneous Electrical Nerve Stimulation (TENS)  13. Transfer Training  14. Ultrasound (US)  15. Therapeutic dry needling     GOALS: (Goals have been discussed and agreed upon with patient.)  Short-Term Functional Goals: Time Frame: 4/12/2022 to 5/10/2022  1. Patient demonstrates independence with home exercise program without verbal cueing provided by therapist.  2. Pt will reports worst pain 3/10 or less at rest in order to return to unrestricted prolonged sitting 30 mins or greater. 3. Pt will return to unrestricted sleeping through night 75% of week in order to return to sleeping habits consistent with prior level of function. Discharge Goals: Time Frame: 4/12/2022 to 6/7/2022  1. Pt will demonstrate left shoulder active flexion ROM to 135 or greater in order to improve independence with functional reaching activities.   2. Pt will demonstrate left shoulder functional IR to T10 or greater in order to improve activities requiring reaching behind back including ADL dressing/bathing. 3. Pt will demonstrate gross LUE strength 4/5 or greater all planes in order to improve independence with functional lifting and carrying during housework activities. 4. Quick DASH score of 15/55 or less in order to demonstrate overall functional improvement. Outcome Measure: Tool Used: Disabilities of the Arm, Shoulder and Hand (DASH) Questionnaire - Quick Version  Score:  Initial: 22/55  Most Recent: X/55 (Date: -- )   Interpretation of Score: The DASH is designed to measure the activities of daily living in person's with upper extremity dysfunction or pain. Each section is scored on a 1-5 scale, 5 representing the greatest disability. The scores of each section are added together for a total score of 55. Medical Necessity:   · Patient is expected to demonstrate progress in strength, range of motion, coordination and functional technique to increase independence with functional reaching/carrying activities, house work, ADLs including dressing and bathing. .  · Skilled intervention continues to be required due to decreased AROM, decreased strength, decreased functional mobility. .  Reason for Services/Other Comments:  · Patient continues to require skilled intervention due to increasing complexity of exercise. .  Total Evaluation Duration: 20 minutes    Rehabilitation Potential For Stated Goals: Good  Regarding Lorraine Gray therapy, I certify that the treatment plan above will be carried out by a therapist or under their direction.   Thank you for this referral,  Kyle Landeros, PT   DPT  Referring Physician Signature: JUSTIN Eddy _______________________________ Date _____________             PAIN/SUBJECTIVE:    Initial: Pain Intensity 1: 3  Pain Location 1: Shoulder  Pain Orientation 1: Left  Post Session:  2/10    HISTORY:    History of Injury/Illness (Reason for Referral):  Ms. Purvi Levine is a 66 y.o. female presenting to physical therapy with complaints of left shoulder pain and stiffness secondary to MD diagnosis left shoulder/AC joint OA. Per pt original injury occurred from fall October 4418 due to complications from A-fib. Per pt had pacemaker November 2021. Pt does reports did experience a concussion from fall however has recovered from concussion symptoms fully at this time. Pt reports history of spinal fractures at T3 and T6 from fall which are being monitored by MD. Pt reports x-ray performed on let shoulder indicating AC joint OA. Pt denies numbness and tingling symptoms. Pt reports gets most relief from laying supine. Primary complaints at this time include pain with reaching and lifting activities. Pt received injection to left shoulder at most recent MD visit which significantly reduced overall pain levels. Pt referred by MD for PT eval and treat. Past Medical History/Comorbidities:   Ms. Purvi Levine  has a past medical history of Adverse effect of anesthesia, Anxiety, Atrial fibrillation ([address] Depression, Diabetes ([address] Hypercholesterolemia, Hypertension, IBS (irritable bowel syndrome), Lumbar disc herniation, Pacemaker, 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 ([address] Sjogren's syndrome ([address] Sleep apnea, SSS (sick sinus syndrome) ([address] TBI (traumatic brain injury) ([address] and Thyroid disease. Ms. Purvi Levine  has a past surgical history that includes hx lap cholecystectomy (11/2007); hx tubal ligation (1976); colonoscopy (N/A, 5/5/2017); hx orthopaedic; hx retinal detachment repair (Right, 09/2021); and hx pacemaker (11/01/2021). Social History/Living Environment:     Lives with spouse. Prior Level of Function/Work/Activity:  Prior level of function includes independent functional reaching all levels, independent ADLs, independent sleeping through night, independent housework with intermittent mild left shoulder pain.   Dominant Side:         RIGHT Ambulatory/Rehab Services H2 Model Falls Risk Assessment    Risk Factors:       No Risk Factors Identified Ability to Rise from Chair:       (1)  Pushes up, successful in one attempt    Falls Prevention Plan:       No modifications necessary    Total: (5 or greater = High Risk): 1    ©2010 [facility]. All Rights Reserved. Robbi Thapa Patent #[id number]. Federal Law prohibits the replication, distribution or use without written permission from [facility]    Current Medications:        Current Outpatient Medications:     insulin glargine (LANTUS,BASAGLAR) 100 unit/mL (3 mL) inpn, 30 Units by SubCUTAneous route nightly., Disp: 3 Adjustable Dose Pre-filled Pen Syringe, Rfl: 2    OTHER, Healthy Blood Pressure Support-OTC, Disp: , Rfl:     losartan (COZAAR) 25 mg tablet, Take 1 Tablet by mouth two (2) times a day. Indications: high blood pressure (Patient taking differently: Take 25 mg by mouth two (2) times a day. Pt reports she takes 1 tab in the morning and 1.5 tabs in the evening  Indications: high blood pressure), Disp: 180 Tablet, Rfl: 1    ezetimibe (ZETIA) 10 mg tablet, TAKE 1 TABLET DAILY FOR HIGH CHOLESTEROL, Disp: 90 Tablet, Rfl: 0    apixaban (ELIQUIS) 5 mg tablet, Take 1 Tablet by mouth two (2) times a day., Disp: 180 Tablet, Rfl: 3    thyroid, [place], ([place] Thyroid) 30 mg tablet, Take 1 Tablet by mouth daily. , Disp: 90 Tablet, Rfl: 0    OTHER, Herbal supplements, Disp: , Rfl:     azelastine (ASTELIN) 137 mcg (0.1 %) nasal spray, 2 Sprays by Both Nostrils route two (2) times a day. Use in each nostril as directed, Disp: 2 Each, Rfl: 5    Insulin Needles, Disposable, (Pen Needle) 32 gauge x 5/32\" ndle, 1 needle daily with insulin, Disp: 100 Pen Needle, Rfl: 2    acetaminophen (TYLENOL) 325 mg tablet, Take 2 Tablets by mouth every four (4) hours as needed. , Disp: , Rfl:     meclizine (ANTIVERT) 25 mg tablet, Take 2 mg by mouth as needed. , Disp: , Rfl:     zinc sulfate (ZINC-15 PO), Take  by mouth., Disp: , Rfl:     multivitamin (ONE A DAY) tablet, Take 1 Tab by mouth daily. , Disp: , Rfl:     ascorbic acid, vitamin C, (Vitamin C) 500 mg tablet, Take  by mouth., Disp: , Rfl:     OTHER,NON-FORMULARY,, Recovery metabollc rescue, Disp: , Rfl:     OTHER,NON-FORMULARY,, Total restore, Disp: , Rfl:     Blood-Glucose Meter monitoring kit, 1 glucometer, use as directed, Disp: 1 Kit, Rfl: 0    glucose blood VI test strips (blood glucose test) strip, 1 strip daily, Disp: 100 Strip, Rfl: 1    selenium 200 mcg cap, Take  by mouth., Disp: , Rfl:     SKULLCAP PO, Take  by mouth., Disp: , Rfl:     fluticasone propionate (FLONASE) 50 mcg/actuation nasal spray, 2 Sprays by Both Nostrils route daily. , Disp: 1 Bottle, Rfl: 5    OTHER,NON-FORMULARY,, 1 Tablet daily. Indications: Lectin Shield, intestinal health, Disp: , Rfl:     FLAXSEED PO, Take  by mouth., Disp: , Rfl:     turmeric (CURCUMIN), by Does Not Apply route., Disp: , Rfl:     dietary supplement cap, Take  by mouth daily. Indications: AD-Pro Vit A & D, Disp: , Rfl:     Date Last Reviewed:  4/12/2022    Number of Personal Factors/Comorbidities that affect the Plan of Care: 1-2: MODERATE COMPLEXITY    EXAMINATION:    Observation/Postural and Gait Assessment:  Posture: Fair, moderate thoracic kyphosis,     Palpation: Mild tenderness left upper trap, deltoid, bicep. ROM:   PROM in degrees Left Right   Shoulder flexion 110° 142°   Shoulder abduction  NT° NT°   Shoulder internal rotation (IR) 70° WNL°   Shoulder external rotation (ER) 55° WNL°   Functional ER C7 T4   Functional IR L3 T7     Passive Accessory Motion: Mild hypomobility left GH joint. Strength:     Manual Muscle Test (out of 5) Left Right   Shoulder scaption 4- 4   Shoulder ER 4- 4+   Shoulder IR 4+ 5   Shoulder Abduction 4- 4     Special Tests:  + impingement. Neurological Screen:  Myotomes: Key muscle strength testing for bilateral UE is WNL.   Dermatomes: Sensation testing through bilateral UE for light touch is WNL. Reflexes: Biceps (C5), brachioradialis (C6), and triceps (C7) are WNL and WNL. Functional Mobility:  Functional reach: Mild shrugging compensations observed L shoulder when reaching above shoulder level. Body Structures Involved:  1. Nerves  2. Bones  3. Joints  4. Muscles  5. Ligaments Body Functions Affected:  1. Neuromusculoskeletal  2. Movement Related Activities and Participation Affected:  1. Mobility  2. Self Care  3. Domestic Life  4. Interpersonal Interactions and Relationships  5.  Community, Social and [place]    Number of elements (examined above) that affect the Plan of Care: 4+: HIGH COMPLEXITY    CLINICAL PRESENTATION:    Presentation: Evolving clinical presentation with changing clinical characteristics: MODERATE COMPLEXITY    CLINICAL DECISION MAKING:    Use of outcome tool(s) and clinical judgement create a POC that gives a: Clear prediction of patient's progress: LOW COMPLEXITY

## 2022-04-15 ENCOUNTER — HOSPITAL ENCOUNTER (OUTPATIENT)
Dept: PHYSICAL THERAPY | Age: 72
Discharge: HOME | End: 2022-04-15
Payer: MEDICARE

## 2022-04-15 PROCEDURE — 97110 THERAPEUTIC EXERCISES: CPT

## 2022-04-15 PROCEDURE — 97140 MANUAL THERAPY 1/> REGIONS: CPT

## 2022-04-15 NOTE — PROGRESS NOTES
Andrew Dawson  : 1950  [place]  Secondary: [address] [facility] [address] at [facility]  [address], [address] [facility], [address]  [phone]   [phone]      OUTPATIENT PHYSICAL THERAPY: Daily Treatment Note 4/15/2022    ICD-10: Treatment Diagnosis: Left shoulder pain [M25.512]                Treatment Diagnosis 2: Primary osteoarthritis, left shoulder [M19.012]                Treatment Diagnosis 3: Left shoulder stiffness [M25.612]                 Treatment Diagnosis 4: Muscle weakness, generalized [M62.81]  Precautions: Pacemaker, h/o A-fib, osteoporosis, h/o T3/T6 spine fractures. Allergies: Clindamycin, Amlodipine, Metformin, Amoxicillin, Cortisone, Methylcellulose, Statins-hmg-coa reductase inhibitors, Tradjenta [linagliptin], and Vit e acet-min oil-dimethicone   TREATMENT PLAN:  Effective Dates: 2022 TO 2022. Frequency/Duration: 2 times a week for 8 weeks. MEDICAL/REFERRING DIAGNOSIS:  Pain in left shoulder [M25.512]  Primary osteoarthritis, left shoulder [M19.012]   DATE OF ONSET: Original injury from fall 10/2021. REFERRING PHYSICIAN: JUSTIN Pak MD Orders: Evaluate and Treat   Return MD Appointment: 2022. Pre-treatment Symptoms/Complaints:  Pt reports should \"feels about the same\". HEP compliance good. Pain: Initial: Pain Location 1: Shoulder  Pain Orientation 1: Left  Post Session:  2/10   Medications Last Reviewed:  4/15/2022  Updated Objective Findings:  Palpation: upper trap tightness/trigger points. TREATMENT:   THERAPEUTIC EXERCISE: (30 minutes):  Exercises per grid below to improve mobility, strength and coordination. Required moderate visual, verbal, manual and tactile cues to promote proper body alignment, promote proper body posture and promote proper body mechanics. Progressed resistance, range, repetitions and complexity of movement as indicated.      Date:  2022 Date:  4/15/22 Date:     Activity/Exercise Parameters Parameters Parameters   Pulley  Next visit    Scap retract x20 x20    Upper trap stretch [id number] each    Shoulder flexion Supine with cane, x10 Supine with cane, 2x10    Shoulder ER Supine with cane, x10 Supine with cane, 2x10    Supine punch  Supine with cane, x10 (watching for back pain)    Sidelying ER  x10    Pec stretch  Next visit, arms low    Theraband row  Next visit    Theraband extension  Next visit            Time spent with patient reviewing proper muscle recruitment and technique with exercises. MANUAL THERAPY: (25 minutes): Joint mobilization, Soft tissue mobilization and PROM was utilized and necessary because of the patient's restricted joint motion, painful spasm, loss of articular motion and restricted motion of soft tissue   Soft tissue mobilization: Deltoid, upper trap, bicep.  Joint mobs: GH joint AP grade II/III, AC joint AP grade II.   PROM: all planes to tolerance. MODALITIES: (0 minutes):      None today. HEP: As above; handouts given to patient for all exercises. Treatment/Session Summary:    · Response to Treatment:  Good tolerance to progression of exercise program. Verbal cues provided throughout treatment to ensure correct exercise technique. · Communication/Consultation:  HEP update provided. · Equipment provided today:  None today  · Recommendations/Intent for next treatment session: Next visit will focus on pain control, improve left shoulder ROM, improve gh joint/scapulohumeral joint mobility, improve left shoulder strength/stability. Total Treatment Billable Duration:  55 mins.   PT Patient Time In/Time Out  Time In: 1432  Time Out: Stan 32, PT

## 2022-04-19 ENCOUNTER — HOSPITAL ENCOUNTER (OUTPATIENT)
Dept: PHYSICAL THERAPY | Age: 72
Discharge: HOME | End: 2022-04-19
Payer: MEDICARE

## 2022-04-21 ENCOUNTER — HOSPITAL ENCOUNTER (OUTPATIENT)
Dept: PHYSICAL THERAPY | Age: 72
Discharge: HOME | End: 2022-04-21
Payer: MEDICARE

## 2022-04-21 PROCEDURE — 97110 THERAPEUTIC EXERCISES: CPT

## 2022-04-21 PROCEDURE — 97140 MANUAL THERAPY 1/> REGIONS: CPT

## 2022-04-21 NOTE — PROGRESS NOTES
Jolene Guillen  : 1950  [place]  Secondary: [address] [facility] [address] at [facility]  [address], [address] [facility], [address]  [phone]   [phone]      OUTPATIENT PHYSICAL THERAPY: Daily Treatment Note 2022    ICD-10: Treatment Diagnosis: Left shoulder pain [M25.512]                Treatment Diagnosis 2: Primary osteoarthritis, left shoulder [M19.012]                Treatment Diagnosis 3: Left shoulder stiffness [M25.612]                 Treatment Diagnosis 4: Muscle weakness, generalized [M62.81]  Precautions: Pacemaker, h/o A-fib, osteoporosis, h/o T3/T6 spine fractures. Allergies: Clindamycin, Amlodipine, Metformin, Amoxicillin, Cortisone, Methylcellulose, Statins-hmg-coa reductase inhibitors, Tradjenta [linagliptin], and Vit e acet-min oil-dimethicone   TREATMENT PLAN:  Effective Dates: 2022 TO 2022. Frequency/Duration: 2 times a week for 8 weeks. MEDICAL/REFERRING DIAGNOSIS:  Pain in left shoulder [M25.512]  Primary osteoarthritis, left shoulder [M19.012]   DATE OF ONSET: Original injury from fall 10/2021. REFERRING PHYSICIAN: JUSTIN Schreiber MD Orders: Evaluate and Treat   Return MD Appointment: 2022. Pre-treatment Symptoms/Complaints:  Pt. Reported cardiologist did not want her to take her left arm above her head. Pt. Reported no pain today. Pain: Initial: Pain Intensity 1: 0  Pain Location 1: Shoulder  Pain Orientation 1: Left  Post Session:  2/10   Medications Last Reviewed:  2022  Updated Objective Findings:  Pt. was guarded initially then relaxed and was compliant with all exercises. TREATMENT:   THERAPEUTIC EXERCISE: (40 minutes):  Exercises per grid below to improve mobility, strength and coordination. Required moderate visual, verbal, manual and tactile cues to promote proper body alignment, promote proper body posture and promote proper body mechanics.   Progressed resistance, range, repetitions and complexity of movement as indicated. Date:  4/12/2022 Date:  4/15/22 Date:     Activity/Exercise Parameters Parameters Parameters   Pulley  Next visit Modified not to go over her head. X 3 mins    Scap retract x20 x20 X 20 reps    Upper trap stretch [id number] each 4x30 sec hold    Shoulder flexion Supine with cane, x10 Supine with cane, 2x10 Supine with wand to 90 degrees 2x10 reps    Shoulder ER Supine with cane, x10 Supine with cane, 2x10 Supine, with wand 2x10 reps     Supine punch  Supine with cane, x10 (watching for back pain) Supine with cane 2x10 reps    Sidelying ER  x10 X 10 reps    Pec stretch  Next visit, arms low 4x30 sec hold    Theraband row  Next visit Yellow band 2x10 reps    Theraband extension  Next visit Yellow band 2x10 reps            Time spent with patient reviewing proper muscle recruitment and technique with exercises. MANUAL THERAPY: (15 minutes): Joint mobilization, Soft tissue mobilization and PROM was utilized and necessary because of the patient's restricted joint motion, painful spasm, loss of articular motion and restricted motion of soft tissue   Soft tissue mobilization: Deltoid, upper trap, bicep.  Joint mobs: GH joint AP grade II/III, AC joint AP grade II.   PROM: all planes to tolerance. MODALITIES: (0 minutes):      None today. HEP: As above; handouts given to patient for all exercises. Treatment/Session Summary:    · Response to Treatment:  Pt. compliant with all exercises and reported feeling better after session  · Communication/Consultation:  HEP update provided. · Equipment provided today:  Pt. issued wall pulley and yellow t-band  · Recommendations/Intent for next treatment session: Next visit will focus on pain control, improve left shoulder ROM, improve gh joint/scapulohumeral joint mobility, improve left shoulder strength/stability. Total Treatment Billable Duration:  55 mins.   PT Patient Time In/Time Out  Time In: 1430  Time Out: [place], [place]

## 2022-04-25 ENCOUNTER — HOSPITAL ENCOUNTER (OUTPATIENT)
Dept: PHYSICAL THERAPY | Age: 72
Discharge: HOME | End: 2022-04-25
Payer: MEDICARE

## 2022-04-25 PROCEDURE — 97140 MANUAL THERAPY 1/> REGIONS: CPT

## 2022-04-25 PROCEDURE — 97110 THERAPEUTIC EXERCISES: CPT

## 2022-04-25 NOTE — PROGRESS NOTES
Lamar Chinchilla  : 1950  [place]  Secondary: [facility] at [facility]  [address], [address], [place], [address] [place] [address] Street  [phone]   [phone]      OUTPATIENT PHYSICAL THERAPY: Daily Treatment Note 2022    ICD-10: Treatment Diagnosis: Left shoulder pain [M25.512]                Treatment Diagnosis 2: Primary osteoarthritis, left shoulder [M19.012]                Treatment Diagnosis 3: Left shoulder stiffness [M25.612]                 Treatment Diagnosis 4: Muscle weakness, generalized [M62.81]  Precautions: Pacemaker, h/o A-fib, osteoporosis, h/o T3/T6 spine fractures. Allergies: Clindamycin, Amlodipine, Metformin, Amoxicillin, Cortisone, Methylcellulose, Statins-hmg-coa reductase inhibitors, Tradjenta [linagliptin], and Vit e acet-min oil-dimethicone   TREATMENT PLAN:  Effective Dates: 2022 TO 2022. Frequency/Duration: 2 times a week for 8 weeks. MEDICAL/REFERRING DIAGNOSIS:  Pain in left shoulder [M25.512]  Primary osteoarthritis, left shoulder [M19.012]   DATE OF ONSET: Original injury from fall 10/2021. REFERRING PHYSICIAN: JUSTIN Avila MD Orders: Evaluate and Treat   Return MD Appointment: 2022. Pre-treatment Symptoms/Complaints:  Pt reports has noticed less pain during the night; per pt pain mostly before going to sleep and first thing in AM.    Pain: Initial: Pain Intensity 1: 1  Pain Location 1: Shoulder  Pain Orientation 1: Left  Post Session:  1/10   Medications Last Reviewed:  2022  Updated Objective Findings:  Observation: shrugging compensations observed during band exercise; correct with verbal cues. TREATMENT:   THERAPEUTIC EXERCISE: (38 minutes):  Exercises per grid below to improve mobility, strength and coordination.   Required moderate visual, verbal, manual and tactile cues to promote proper body alignment, promote proper body posture and promote proper body mechanics. Progressed resistance, range, repetitions and complexity of movement as indicated. Date:  4/12/2022 Date:  4/15/22 Date:  4/25/22   Activity/Exercise Parameters Parameters Parameters   [place]  Next visit Shoulder level, 3 mins   Shoulder rolls   x15   Scap retract x20 x20 --   Upper trap stretch [id number] each 3x30 sec hold    Shoulder flexion Supine with cane, x10 Supine with cane, 2x10 Supine with wand to 90 degrees 2x10 reps    Shoulder ER Supine with cane, x10 Supine with cane, 2x10 Supine, with wand 2x10 reps     Supine punch  Supine with cane, x10 (watching for back pain) Supine with cane 2x10 reps    Sidelying ER  x10 2X 10 reps    Pec stretch  Next visit, arms low 3x30 sec hold    Theraband row  Next visit Yellow band x10 reps    Theraband extension  Next visit Yellow band x10 reps    Wall slides   x10 shoulder level. Time spent with patient reviewing proper muscle recruitment and technique with exercises. MANUAL THERAPY: (15 minutes): Joint mobilization, Soft tissue mobilization and PROM was utilized and necessary because of the patient's restricted joint motion, painful spasm, loss of articular motion and restricted motion of soft tissue   Soft tissue mobilization: Deltoid, upper trap, bicep.  Joint mobs: GH joint AP grade II/III, AC joint AP grade II.   PROM: all planes to tolerance. MODALITIES: (0 minutes):      None today. HEP: As above; handouts given to patient for all exercises. Treatment/Session Summary:    · Response to Treatment:  Exercise repititions reduced slightly today to prevent excessive post treatment soreness. · Communication/Consultation:  None today  · Equipment provided today:  None today  · Recommendations/Intent for next treatment session: Next visit will focus on pain control, improve left shoulder ROM, improve gh joint/scapulohumeral joint mobility, improve left shoulder strength/stability.     Total Treatment Billable Duration:  53 mins.  PT Patient Time In/Time Out  Time In: 1333  Time Out: 1429  Nasim Reyes PT

## 2022-04-29 ENCOUNTER — HOSPITAL ENCOUNTER (OUTPATIENT)
Dept: PHYSICAL THERAPY | Age: 72
Discharge: HOME | End: 2022-04-29
Payer: MEDICARE

## 2022-05-05 ENCOUNTER — HOSPITAL ENCOUNTER (OUTPATIENT)
Dept: PHYSICAL THERAPY | Age: 72
Discharge: HOME | End: 2022-05-05

## 2022-05-05 NOTE — PROGRESS NOTES
Physical Therapy  [facility]  Date: 5/5/2022    Holding PT treatment at this time per pt request until after MD follow up appointment week of 5/9/2022.       [facility], DPT

## 2022-05-06 ENCOUNTER — APPOINTMENT (OUTPATIENT)
Dept: PHYSICAL THERAPY | Age: 72
End: 2022-05-06

## 2022-05-11 ENCOUNTER — HOSPITAL ENCOUNTER (OUTPATIENT)
Dept: PHYSICAL THERAPY | Age: 72
Discharge: HOME | End: 2022-05-11

## 2022-05-11 NOTE — PROGRESS NOTES
Physical Therapy  [address] [place] [address]  Date: 5/11/2022    Patient requesting to hold additional treatment at this time per pt request and MD advisement.       Conception NADYA Breaux

## 2022-05-13 ENCOUNTER — APPOINTMENT (OUTPATIENT)
Dept: PHYSICAL THERAPY | Age: 72
End: 2022-05-13

## 2022-05-16 ENCOUNTER — APPOINTMENT (OUTPATIENT)
Dept: PHYSICAL THERAPY | Age: 72
End: 2022-05-16

## 2022-05-17 NOTE — THERAPY DISCHARGE
Chidi Farr  : 1950  [place]  Secondary: [address] [facility] at [place] [id number] [address], [address]  [phone]   [phone]       OUTPATIENT PHYSICAL THERAPY:Discontinuation Summary 2022    ICD-10: Treatment Diagnosis: Left shoulder pain [M25.512]                Treatment Diagnosis 2: Primary osteoarthritis, left shoulder [M19.012]                Treatment Diagnosis 3: Left shoulder stiffness [M25.612]                 Treatment Diagnosis 4: Muscle weakness, generalized [M62.81]  Precautions: Pacemaker, h/o A-fib, osteoporosis, h/o T3/T6 spine fractures. Allergies: Clindamycin, Amlodipine, Metformin, Amoxicillin, Cortisone, Methylcellulose, Statins-hmg-coa reductase inhibitors, Tradjenta [linagliptin], and Vit e acet-min oil-dimethicone   TREATMENT PLAN:  Effective Dates: 2022 TO 2022. Frequency/Duration: 2 times a week for 8 weeks. MEDICAL/REFERRING DIAGNOSIS:  Pain in left shoulder [M25.512]  Primary osteoarthritis, left shoulder [M19.012]   DATE OF ONSET: Original injury from fall 10/2021. REFERRING PHYSICIAN: JUSTIN Newsome MD Orders: Evaluate and Treat   Return MD Appointment: 2022. INITIAL ASSESSMENT:  Ms. Amanda Gomes is a 66 y.o. female presenting to physical therapy with complaints of left shoulder pain and stiffness secondary to MD diagnosis left shoulder/AC joint OA. Per pt original injury occurred from fall 8753 due to complications from A-fib. Per pt had pacemaker 2021. Pt does reports did experience a concussion from fall however has recovered from concussion symptoms fully at this time. Pt reports history of spinal fractures at T3 and T6 from fall which are being monitored by MD. Pt reports x-ray performed on let shoulder indicating AC joint OA. Pt denies numbness and tingling symptoms. Pt reports gets most relief from laying supine.  Primary complaints at this time include pain with reaching and lifting activities. Pt received injection to left shoulder at most recent MD visit which significantly reduced overall pain levels. Pt referred by MD for PT eval and treat. Pt will benefit from skilled PT treatment to address deficits in strength, AROM, and functional mobility in order to return to prior level of function and improve quality of life. DISCONTINUATION NOTE 5/17/2022: Pt completed 4 PT treatment sessions and 3 cancelled visits from 4/12/2022 to 4/25/2022. Pt contacted PT office following last cancelled visit and informed PT office that she was instructed to hold PT treatment per advisement from her cardiac MD. Pt reported overall improved pain levels and function at last contact. Pt will be discharged from PT treatment at this time. PROBLEM LIST (Impacting functional limitations):  1. Decreased Strength  2. Decreased ADL/Functional Activities  3. Decreased Transfer Abilities  4. Increased Pain  5. Decreased Activity Tolerance  6. Increased Fatigue  7. Decreased Flexibility/Joint Mobility  8. Decreased [place] with Home Exercise Program INTERVENTIONS PLANNED: (Treatment may consist of any combination of the following)  1. Bed Mobility  2. Cold  3. Electrical Stimulation  4. Heat  5. Home Exercise Program (HEP)  6. Iontophoresis  7. Manual Therapy  8. Neuromuscular Re-education/Strengthening  9. Range of Motion (ROM)  10. Therapeutic Activites  11. Therapeutic Exercise/Strengthening  12. Transcutaneous Electrical Nerve Stimulation (TENS)  13. Transfer Training  14. Ultrasound (US)  15. Therapeutic dry needling     GOALS: (Goals have been discussed and agreed upon with patient.)  Short-Term Functional Goals: Time Frame: 4/12/2022 to 5/10/2022  1. Patient demonstrates independence with home exercise program without verbal cueing provided by therapist. - NOT MET  2.  Pt will reports worst pain 3/10 or less at rest in order to return to unrestricted prolonged sitting 30 mins or greater.- NOT MET  3. Pt will return to unrestricted sleeping through night 75% of week in order to return to sleeping habits consistent with prior level of function.- NOT MET  Discharge Goals: Time Frame: 4/12/2022 to 6/7/2022  1. Pt will demonstrate left shoulder active flexion ROM to 135 or greater in order to improve independence with functional reaching activities.- NOT MET  2. Pt will demonstrate left shoulder functional IR to T10 or greater in order to improve activities requiring reaching behind back including ADL dressing/bathing.- NOT MET  3. Pt will demonstrate gross LUE strength 4/5 or greater all planes in order to improve independence with functional lifting and carrying during housework activities.- NOT MET  4. Quick DASH score of 15/55 or less in order to demonstrate overall functional improvement.- NOT MET    Outcome Measure: Tool Used: Disabilities of the Arm, Shoulder and Hand (DASH) Questionnaire - Quick Version  Score:  Initial: 22/55  Most Recent: X/55 (Date: -- )   Interpretation of Score: The DASH is designed to measure the activities of daily living in person's with upper extremity dysfunction or pain. Each section is scored on a 1-5 scale, 5 representing the greatest disability. The scores of each section are added together for a total score of 55. Observation/Postural and Gait Assessment:  Posture: Fair, moderate thoracic kyphosis,     Palpation: Mild tenderness left upper trap, deltoid, bicep. ROM:   PROM in degrees Left Right   Shoulder flexion 110° 142°   Shoulder abduction  NT° NT°   Shoulder internal rotation (IR) 70° WNL°   Shoulder external rotation (ER) 55° WNL°   Functional ER C7 T4   Functional IR L3 T7     Passive Accessory Motion: Mild hypomobility left GH joint.     Strength:     Manual Muscle Test (out of 5) Left Right   Shoulder scaption 4- 4   Shoulder ER 4- 4+   Shoulder IR 4+ 5   Shoulder Abduction 4- 4     Special Tests:  + impingement. Neurological Screen:  Myotomes: Key muscle strength testing for bilateral UE is WNL. Dermatomes: Sensation testing through bilateral UE for light touch is WNL. Reflexes: Biceps (C5), brachioradialis (C6), and triceps (C7) are WNL and WNL. Functional Mobility:  Functional reach: Mild shrugging compensations observed L shoulder when reaching above shoulder level. Regarding Baron Delgado's therapy, I certify that the treatment plan above will be carried out by a therapist or under their direction. Thank you for this referral,  Joanne Casper, PT   DPT  Referring Physician Signature: JUSTIN Nunez No Signature is Required for this note.

## 2022-05-19 ENCOUNTER — APPOINTMENT (OUTPATIENT)
Dept: PHYSICAL THERAPY | Age: 72
End: 2022-05-19

## 2022-05-23 ENCOUNTER — APPOINTMENT (OUTPATIENT)
Dept: PHYSICAL THERAPY | Age: 72
End: 2022-05-23

## 2022-05-25 ENCOUNTER — APPOINTMENT (OUTPATIENT)
Dept: PHYSICAL THERAPY | Age: 72
End: 2022-05-25

## 2022-05-31 ENCOUNTER — APPOINTMENT (OUTPATIENT)
Dept: PHYSICAL THERAPY | Age: 72
End: 2022-05-31

## 2022-06-02 ENCOUNTER — APPOINTMENT (OUTPATIENT)
Dept: PHYSICAL THERAPY | Age: 72
End: 2022-06-02

## 2022-06-07 ENCOUNTER — APPOINTMENT (OUTPATIENT)
Dept: PHYSICAL THERAPY | Age: 72
End: 2022-06-07

## 2022-06-07 ASSESSMENT — SLEEP AND FATIGUE QUESTIONNAIRES
HOW LIKELY ARE YOU TO NOD OFF OR FALL ASLEEP WHILE WATCHING TV: 1
HOW LIKELY ARE YOU TO NOD OFF OR FALL ASLEEP WHEN YOU ARE A PASSENGER IN A CAR FOR AN HOUR WITHOUT A BREAK: 2
HOW LIKELY ARE YOU TO NOD OFF OR FALL ASLEEP WHILE LYING DOWN TO REST IN THE AFTERNOON WHEN CIRCUMSTANCES PERMIT: 3
HOW LIKELY ARE YOU TO NOD OFF OR FALL ASLEEP IN A CAR, WHILE STOPPED FOR A FEW MINUTES IN TRAFFIC: 0
HOW LIKELY ARE YOU TO NOD OFF OR FALL ASLEEP WHILE SITTING AND TALKING TO SOMEONE: 1
HOW LIKELY ARE YOU TO NOD OFF OR FALL ASLEEP WHILE SITTING AND READING: 1
HOW LIKELY ARE YOU TO NOD OFF OR FALL ASLEEP WHILE SITTING INACTIVE IN A PUBLIC PLACE: 1
HOW LIKELY ARE YOU TO NOD OFF OR FALL ASLEEP WHILE SITTING QUIETLY AFTER LUNCH WITHOUT ALCOHOL: 2
ESS TOTAL SCORE: 11

## 2022-06-07 NOTE — PROGRESS NOTES
[address] [place] [address], [address] [facility]  [phone]    Patient Name:  Adiel Oh  YOB: 1950      Office Visit 6/8/2022    CHIEF COMPLAINT:    Chief Complaint   Patient presents with    New Patient    Other       HISTORY OF PRESENT ILLNESS:      The patient presents in outpatient consultation at the request of Dr. Duane Rakers for management of obstructive sleep apnea. PMH includes HTN, symptomatic bradycardia now with PPM, DM, HLD, TBI, Afib, hypothyroidism, anxiety, depression, and sjogren's. The diagnostic polysomnography was notable for an apnea hypopnea index of 2.3 including 4 obstructive apneas, 8 hypopneas. Oxygen desaturations are low as 84% were noted with SpO2 less than 89% for a total of 37.2 minutes of the test.  Sleep efficiency was only 67%. Significant cardiac arrhythmias were not evident. The patient was noted to have 17.0 limb movements with a limb movement arousal index being about 0.6. Patient has been told that she snores, has witnessed apneas but denies any awakening gasping or choking. She has rare vivid dreams but denies any parasomnias. She does have evidence of bruxism and has previously had a guard for this. She does have leg cramps but rare aching sensation, does endorse frequent leg movements. She states she is falling asleep easily but will awaken 3-4 times per night due to nocturia or just because and has some difficulty going back to sleep. She typically sleeps from 11 PM and will wake up around 9 AM-11 AM.  She states she tosses and turns all night. She does not feel rested usually in the morning although this has improved somewhat with her herbal supplement. She is having daytime fatigue and is napping daily. She has rare morning headaches but does awaken with dry mouth and dry eyes. Current [place] score is 11/24.     She denies any tobacco or alcohol, has 2 cups of tea with caffeine per day and will drink herbal tea throughout the day as well. She has previously tried melatonin but is now taking blue skullcap which she states is an herbal supplement that is helping her with sleep. The pathophysiology of obstructive sleep apnea was reviewed with the patient. It's potential to promote severe neurologic, cardiac, pulmonary, and gastrointestinal problems was discussed. Specifically, the increased incidence of hypertension, coronary artery disease, congestive heart failure, pulmonary hypertension, gastroesophageal reflux, pathologic hypersomnolence, memory loss, and glucose intolerance was related to the consequences of hypoxemia, hypercapnia, airway obstruction, and sympathetic overdrive. We also discussed the ability of nasal CPAP to correct these abnormalities through maintenance of a patent airway. Therapeutic options including surgery, oral appliances, and weight loss were also reviewed.         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 ([address]     Sjogren's syndrome (HCC)     dry eye and fatigue are her symptoms    Sleep apnea     patient does not use a c-pap    SSS (sick sinus syndrome) ([address]     TBI (traumatic brain injury) ([address]     Thyroid disease     hypothyroidism         Patient Active Problem List   Diagnosis    Anxiety    Post-traumatic headache    DM (diabetes mellitus) ([address]    Age-related nuclear cataract of both eyes    Statin intolerance    Symptomatic bradycardia    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 (HCC)    Major depressive disorder with single episode    Hyperlipidemia associated with type 2 diabetes mellitus ([facility]    Dry eye syndrome, bilateral    Pacemaker    Sjogren's syndrome ([facility]    Primary hypothyroidism    IBS (irritable bowel syndrome)    HTN (hypertension)    Acute midline low back pain without sciatica           Past Surgical History:   Procedure Laterality Date    CHOLECYSTECTOMY, LAPAROSCOPIC  11/2007    COLONOSCOPY N/A 5/5/2017    COLONOSCOPY/ 23 performed by Eligio Pate MD at [address]      '18 laser treaatment lumber disc herniation    PACEMAKER  11/01/2021    RETINAL DETACHMENT SURGERY Right 09/2021    TUBAL LIGATION  1976         Social History     Socioeconomic History    Marital status:      Spouse name: Not on file    Number of children: Not on file    Years of education: Not on file    Highest education level: Not on file   Occupational History    Not on file   Tobacco Use    Smoking status: Never Smoker    Smokeless tobacco: Never Used   Substance and Sexual Activity    Alcohol use: No    Drug use: No    Sexual activity: Not on file   Other Topics Concern    Not on file   Social History Narrative    Not on file     Social Determinants of Health     Financial Resource Strain:     Difficulty of Paying Living Expenses: Not on file   Food Insecurity:     Worried About [facility] in the Last Year: Not on file    Jonny of Food in the Last Year: Not on file   Transportation Needs:     Lack of Transportation (Medical): Not on file    Lack of Transportation (Non-Medical):  Not on file   Physical Activity:     Days of Exercise per Week: Not on file    Minutes of Exercise per Session: Not on file   Stress:     Feeling of Stress : Not on file   Social Connections:     Frequency of Communication with Friends and Family: Not on file    Frequency of Social Gatherings with Friends and Family: Not on file    Attends Hindu Services: Not on file    Active Member of Clubs or Organizations: Not on file   Primo Lomas Attends Club or Organization Meetings: Not on file    Marital Status: Not on file   Intimate Partner Violence:     Fear of Current or Ex-Partner: Not on file    Emotionally Abused: Not on file    Physically Abused: Not on file    Sexually Abused: Not on file   Housing Stability:     Unable to Pay for Housing in the Last Year: Not on file    Number of [place] in the Last Year: Not on file    Unstable Housing in the Last Year: Not on file         Family History   Problem Relation Age of Onset    Cancer Father         [place]    Cancer Paternal Aunt         ovarian    Heart Disease Brother     Breast Cancer Neg Hx     Cancer Paternal Grandfather         [place]    Heart Disease Father     Stroke Father     Diabetes Brother         DMII         Allergies   Allergen Reactions    Clindamycin Anaphylaxis    Amlodipine Diarrhea    Metformin Nausea Only    Amoxicillin Other (See Comments)     Tolerates up to 500 mg dose OK    Cortisone Other (See Comments)    Linagliptin Other (See Comments)     floaters    Methylcellulose Other (See Comments)         Current Outpatient Medications   Medication Sig    acetaminophen (TYLENOL) 325 MG tablet Take 650 mg by mouth every 4 hours as needed    apixaban (ELIQUIS) 5 MG TABS tablet Take 5 mg by mouth 2 times daily    ascorbic acid (VITAMIN C) 500 MG tablet Take by mouth    azelastine (ASTELIN) 0.1 % nasal spray 2 sprays by Nasal route 2 times daily    ezetimibe (ZETIA) 10 MG tablet TAKE 1 TABLET DAILY FOR HIGH CHOLESTEROL    fluticasone (FLONASE) 50 MCG/ACT nasal spray 2 sprays by Nasal route daily    insulin glargine (LANTUS;BASAGLAR) 100 UNIT/ML injection pen Inject 30 Units into the skin    losartan (COZAAR) 25 MG tablet Take 25 mg by mouth 2 times daily    meclizine (ANTIVERT) 25 MG tablet Take 2 mg by mouth as needed    thyroid (ARMOUR) 30 MG tablet Take 30 mg by mouth daily     No current facility-administered medications for this visit. REVIEW OF SYSTEMS:   CONSTITUTIONAL: Hypersomnia. There is no history of fever, chills, night sweats, weight loss, weight gain, persistent fatigue   EYES:   Denies problems with eye pain, erythema, blurred vision, or visual field loss. ENTM:   Denies history of tinnitus, epistaxis, sore throat, hoarseness, or dysphonia. LYMPH:   Denies swollen glands. CARDIAC: HTN, A. fib   No chest pain, pressure, discomfort, palpitations, orthopnea, murmurs, or edema. GI:   No dysphagia, heartburn reflux, nausea/vomiting, diarrhea, abdominal pain, or bleeding. :   Denies history of dysuria, hematuria, polyuria, or decreased urine output. MS:   No history of myalgias, arthralgias, bone pain, or muscle cramps. SKIN:   No history of rashes, jaundice, cyanosis, nodules, or ulcers. ENDO: Hypothyroidism, DM   Negative for heat or cold intolerance. PSYCH:   Negative for anxiety, depression, insomnia, hallucinations. NEURO: Short-term memory loss   There is no history of AMS, persistent headache, decreased level of consciousness, seizures, or motor or sensory deficits. PHYSICAL EXAM:    Vitals:    06/08/22 1459   BP: (!) 166/88   Pulse: 88   Resp: 14   Temp: 97.6 °F (36.4 °C)   SpO2: 96%        GENERAL APPEARANCE:   The patient is normal weight and in no respiratory distress. HEENT:   PERRL. Conjunctivae unremarkable. Nasal mucosa is without epistaxis, exudate, or polyps. Gums and dentition are unremarkable. There is oropharyngeal narrowing. TMs are clear. NECK/LYMPHATIC:   Symmetrical with no elevation of jugular venous pulsation. Trachea midline. No thyroid enlargement. No cervical adenopathy. LUNGS:   Normal respiratory effort with symmetrical lung expansion. Breath sounds clear. HEART:   There is a regular rate and rhythm. No murmur, rub, or gallop. There is no edema in the lower extremities. ABDOMEN:   Soft and non-tender. No hepatosplenomegaly.   Bowel sounds are normal. SKIN:   There are no rashes, cyanosis, jaundice, or ecchymosis present. EXTREMITIES:   The extremities are unremarkable without clubbing, cyanosis, joint inflammation, degenerative, or ischemic change. MUSCULOSKELETAL:   There is no abnormal tone, muscle atrophy, or abnormal movement present. NEURO:   The patient is alert and oriented to person, place, and time. Memory appears intact and mood is normal.  No gross sensorimotor deficits are present. DIAGNOSTIC TESTS:  Npsg 3/6/22              ASSESSMENT:  (Medical Decision Making)        Diagnosis Orders   1. Non-restorative sleep likely related to untreated sleep apnea. Patient has multiple symptoms. We will proceed with HST as PSG had only a 67% sleep efficiency. Ambulatory Referral to Sleep Studies   2. Nocturnal hypoxemia -this was seen on PSG. SPO2 as low as 84% with SPO2 less than 89% for 37 minutes of the test.  She does not have any known lung issues. Ambulatory Referral to Sleep Studies   3. Snoring -patient endorses snoring as well as witnessed apneas. This is likely related to untreated sleep apnea. Ambulatory Referral to Sleep Studies   4. Hypersomnia -overall this is mild with current [place] score 11/24. Ambulatory Referral to Sleep Studies   5. Bruxism -patient has previously had mouthguard for this. Ambulatory Referral to Sleep Studies   6. Suspected sleep apnea-given extensive medical history and multiple symptoms we will proceed with HST. PLAN:  Given significant symptoms and extensive medical history we will proceed with HST. If patient is positive for sleep apnea strongly recommend CPAP. Appropriate handouts were given to patient including information on sleep apnea, sleep hygiene and PAP therapy.   Follow up with the Sleep Center will be after sleep study or sooner if needed          Orders Placed This Encounter   Procedures    Ambulatory Referral to Sleep Studies     Referral Priority:   Routine     Referral Type: Consult for Advice and Opinion     Referral Reason:   Specialty Services Required     Requested Specialty:   Sleep Center     Number of Visits Requested:   1       Collaborating Physician: Dr. Francisco Kirkpatrick     Over 50% of today's office visit was spent in face to face time reviewing test results, prognosis, importance of compliance, education about disease process, benefits of medications, instructions for management of acute flare-ups, and follow up plans. Total face to face time spent with patient was 40 minutes.     SUZANNE Ni CNP  Electronically signed

## 2022-06-08 ENCOUNTER — OFFICE VISIT (OUTPATIENT)
Dept: SLEEP MEDICINE | Age: 72
End: 2022-06-08
Payer: MEDICARE

## 2022-06-08 VITALS
BODY MASS INDEX: 23.6 KG/M2 | WEIGHT: 125 LBS | OXYGEN SATURATION: 96 % | SYSTOLIC BLOOD PRESSURE: 166 MMHG | TEMPERATURE: 97.6 F | HEIGHT: 61 IN | DIASTOLIC BLOOD PRESSURE: 88 MMHG | RESPIRATION RATE: 14 BRPM | HEART RATE: 88 BPM

## 2022-06-08 DIAGNOSIS — G47.10 HYPERSOMNIA: ICD-10-CM

## 2022-06-08 DIAGNOSIS — R06.83 SNORING: ICD-10-CM

## 2022-06-08 DIAGNOSIS — G47.34 NOCTURNAL HYPOXEMIA: ICD-10-CM

## 2022-06-08 DIAGNOSIS — G47.8 NON-RESTORATIVE SLEEP: Primary | ICD-10-CM

## 2022-06-08 DIAGNOSIS — F45.8 BRUXISM: ICD-10-CM

## 2022-06-08 DIAGNOSIS — R29.818 SUSPECTED SLEEP APNEA: ICD-10-CM

## 2022-06-08 PROCEDURE — 1036F TOBACCO NON-USER: CPT | Performed by: STUDENT IN AN ORGANIZED HEALTH CARE EDUCATION/TRAINING PROGRAM

## 2022-06-08 PROCEDURE — 1123F ACP DISCUSS/DSCN MKR DOCD: CPT | Performed by: STUDENT IN AN ORGANIZED HEALTH CARE EDUCATION/TRAINING PROGRAM

## 2022-06-08 PROCEDURE — G8400 PT W/DXA NO RESULTS DOC: HCPCS | Performed by: STUDENT IN AN ORGANIZED HEALTH CARE EDUCATION/TRAINING PROGRAM

## 2022-06-08 PROCEDURE — G8420 CALC BMI NORM PARAMETERS: HCPCS | Performed by: STUDENT IN AN ORGANIZED HEALTH CARE EDUCATION/TRAINING PROGRAM

## 2022-06-08 PROCEDURE — 99203 OFFICE O/P NEW LOW 30 MIN: CPT | Performed by: STUDENT IN AN ORGANIZED HEALTH CARE EDUCATION/TRAINING PROGRAM

## 2022-06-08 PROCEDURE — 1090F PRES/ABSN URINE INCON ASSESS: CPT | Performed by: STUDENT IN AN ORGANIZED HEALTH CARE EDUCATION/TRAINING PROGRAM

## 2022-06-08 PROCEDURE — G8427 DOCREV CUR MEDS BY ELIG CLIN: HCPCS | Performed by: STUDENT IN AN ORGANIZED HEALTH CARE EDUCATION/TRAINING PROGRAM

## 2022-06-08 PROCEDURE — 3017F COLORECTAL CA SCREEN DOC REV: CPT | Performed by: STUDENT IN AN ORGANIZED HEALTH CARE EDUCATION/TRAINING PROGRAM

## 2022-06-08 NOTE — PATIENT INSTRUCTIONS
You will get a call from [employer] to schedule your at-home sleep study. The day of your sleep study, you will go to [facility] ([address], 3rd floor, [address]). There you will be given the device and taught how to use the device. You will go home, sleep that night with the home sleep test, and bring the device back the next day. A physician will read the study and you will receive a call from our office with results or to schedule a follow-up appointment with the [facility] to discuss treatment options. Patient Education        Sleep Apnea: Care Instructions  Overview     Sleep apnea means that you frequently stop breathing for 10 seconds or longer during sleep. It can be mild to severe, based on the number of times an hourthat you stop breathing. Blocked or narrowed airways in your nose, mouth, or throat can cause sleep apnea. Your airway can become blocked when your throat muscles and tongue relaxduring sleep. You can help treat sleep apnea at home by making lifestyle changes. You also can use a CPAP breathing machine that keeps tissues in the throat from blocking your airway. Or your doctor may suggest that you use a breathing device while you sleep. It helps keep your airway open. This could be a device that you put in your mouth. In some cases, surgery may be needed to remove enlarged tissuesin the throat. Follow-up care is a key part of your treatment and safety. Be sure to make and go to all appointments, and call your doctor if you are having problems. It's also a good idea to know your test results and keep alist of the medicines you take. How can you care for yourself at home?  Lose weight, if needed.  Sleep on your side. It may help mild apnea.  Avoid alcohol and medicines such as sleeping pills, opioids, or sedatives before bed.  Don't smoke. If you need help quitting, talk to your doctor.  Prop up the head of your bed.    Treat breathing problems, such as a stuffy nose, that are caused by a cold or allergies.  Try a continuous positive airway pressure (CPAP) breathing machine if your doctor recommends it.  If CPAP doesn't work for you, ask your doctor if you can try other masks, settings, or breathing machines.  Try oral breathing devices or other nasal devices.  Talk to your doctor if your nose feels dry or bleeds, or if it gets runny or stuffy when you use a breathing machine.  Tell your doctor if you're sleepy during the day and it affects your daily life. Don't drive or operate machinery when you're drowsy. When should you call for help? Watch closely for changes in your health, and be sure to contact your doctor if:     You still have sleep apnea even though you have made lifestyle changes.      You are thinking of trying a device such as CPAP.      You are having problems using a CPAP or similar machine.      You are still sleepy during the day, and it affects your daily life. Where can you learn more? Go to https://Synthelisrohit.Qlika. org and sign in to your [email or web address] account. Enter Z768 in the StatusNet box to learn more about \"Sleep Apnea: Care Instructions. \"     If you do not have an account, please click on the \"Sign Up Now\" link. Current as of: July 6, 2021               Content Version: 13.2  © 8965-4836 Healthwise, Incorporated. Care instructions adapted under license by [facility] ([facility]). If you have questions about a medical condition or this instruction, always ask your healthcare professional. [place] [address] any warranty or liability for your use of this information. Patient Education        Learning About CPAP for Sleep Apnea  What is CPAP? CPAP is a small machine that you use at home every night while you sleep. It increases air pressure in your throat to keep your airway open.  When you have sleep apnea, this can help you sleep better, feel better, and avoid futurehealth problems. CPAP stands for \"continuous positive airway pressure. \"  The CPAP machine will have one of the following:   A mask that covers your nose and mouth   A mask that covers your nose only   A nasal pillow that covers only the openings of your nose  Why is it done? CPAP is usually the best treatment for obstructive sleep apnea. It is the firsttreatment choice and the most widely used. CPAP:   Helps you have more normal sleep, so you feel less sleepy and more alert during the daytime.  May help keep heart failure or other heart problems from getting worse.  May help lower your blood pressure. If you have a bed partner, they may also sleep better when you use a CPAP. That's because you aren't snoring or restless. Your doctor may suggest CPAP if you have:   Moderate to severe sleep apnea.  Sleep apnea and coronary artery disease (CAD).  Sleep apnea and heart failure. What are the side effects? Some people who use CPAP have:   A dry or stuffy nose and a sore throat.  Irritated skin on the face.  Bloating. How can you care for yourself? If using CPAP is not comfortable, or if you have certain side effects, workwith your doctor to fix them. Here are some things you can try:   Be sure the mask, nasal mask, or nasal pillow fits well.  See if your doctor can adjust the pressure of your CPAP.  If your nose or mouth is dry, set the machine to deliver warmer or wetter air. Or try using a humidifier.  If your nose is runny or stuffy, talk to your doctor about using a decongestant medicine or steroid nasal spray. Be safe with medicines. Read and follow all instructions on the label. Do not use the medicine longer than the label says.  Your doctor may also help you with problems like swallowing air, bloating, or claustrophobia. Talk to your doctor if you're still having problems. If these things don'thelp, you might try a different type of machine. Where can you learn more?   Go to https://chpepiceweb.[email or web address] org and sign in to your "[employer] account. Enter H483 in the Friend Traveler box to learn more about \"Learning About CPAP for Sleep Apnea. \"     If you do not have an account, please click on the \"Sign Up Now\" link. Current as of: July 6, 2021               Content Version: 13.2  © 6275-7454 Healthwise, [employer]. Care instructions adapted under license by [facility] ([facility]). If you have questions about a medical condition or this instruction, always ask your healthcare professional. [place] [address] any warranty or liability for your use of this information. Mild/Moderate Sleep Apnea can be treated with a custom-fit oral appliance.

## 2022-06-09 ENCOUNTER — APPOINTMENT (OUTPATIENT)
Dept: PHYSICAL THERAPY | Age: 72
End: 2022-06-09

## 2022-06-13 ENCOUNTER — OFFICE VISIT (OUTPATIENT)
Dept: NEUROLOGY | Age: 72
End: 2022-06-13
Payer: MEDICARE

## 2022-06-13 VITALS
HEART RATE: 82 BPM | HEIGHT: 60 IN | BODY MASS INDEX: 24.74 KG/M2 | SYSTOLIC BLOOD PRESSURE: 185 MMHG | WEIGHT: 126 LBS | DIASTOLIC BLOOD PRESSURE: 94 MMHG | OXYGEN SATURATION: 96 %

## 2022-06-13 DIAGNOSIS — I63.9 CEREBROVASCULAR ACCIDENT (CVA), UNSPECIFIED MECHANISM (HCC): ICD-10-CM

## 2022-06-13 DIAGNOSIS — I67.83 PRES (POSTERIOR REVERSIBLE ENCEPHALOPATHY SYNDROME): Primary | ICD-10-CM

## 2022-06-13 DIAGNOSIS — G45.0 VBI (VERTEBROBASILAR INSUFFICIENCY): ICD-10-CM

## 2022-06-13 PROCEDURE — 1090F PRES/ABSN URINE INCON ASSESS: CPT | Performed by: PSYCHIATRY & NEUROLOGY

## 2022-06-13 PROCEDURE — 1123F ACP DISCUSS/DSCN MKR DOCD: CPT | Performed by: PSYCHIATRY & NEUROLOGY

## 2022-06-13 PROCEDURE — 99214 OFFICE O/P EST MOD 30 MIN: CPT | Performed by: PSYCHIATRY & NEUROLOGY

## 2022-06-13 PROCEDURE — G8400 PT W/DXA NO RESULTS DOC: HCPCS | Performed by: PSYCHIATRY & NEUROLOGY

## 2022-06-13 PROCEDURE — 3017F COLORECTAL CA SCREEN DOC REV: CPT | Performed by: PSYCHIATRY & NEUROLOGY

## 2022-06-13 PROCEDURE — 1036F TOBACCO NON-USER: CPT | Performed by: PSYCHIATRY & NEUROLOGY

## 2022-06-13 PROCEDURE — G8427 DOCREV CUR MEDS BY ELIG CLIN: HCPCS | Performed by: PSYCHIATRY & NEUROLOGY

## 2022-06-13 PROCEDURE — G8420 CALC BMI NORM PARAMETERS: HCPCS | Performed by: PSYCHIATRY & NEUROLOGY

## 2022-06-13 ASSESSMENT — PATIENT HEALTH QUESTIONNAIRE - PHQ9
SUM OF ALL RESPONSES TO PHQ QUESTIONS 1-9: 2
SUM OF ALL RESPONSES TO PHQ QUESTIONS 1-9: 2
4. FEELING TIRED OR HAVING LITTLE ENERGY: 1
SUM OF ALL RESPONSES TO PHQ QUESTIONS 1-9: 2
3. TROUBLE FALLING OR STAYING ASLEEP: 0
9. THOUGHTS THAT YOU WOULD BE BETTER OFF DEAD, OR OF HURTING YOURSELF: 0
7. TROUBLE CONCENTRATING ON THINGS, SUCH AS READING THE NEWSPAPER OR WATCHING TELEVISION: 0
SUM OF ALL RESPONSES TO PHQ QUESTIONS 1-9: 2
8. MOVING OR SPEAKING SO SLOWLY THAT OTHER PEOPLE COULD HAVE NOTICED. OR THE OPPOSITE, BEING SO FIGETY OR RESTLESS THAT YOU HAVE BEEN MOVING AROUND A LOT MORE THAN USUAL: 0
2. FEELING DOWN, DEPRESSED OR HOPELESS: 1
6. FEELING BAD ABOUT YOURSELF - OR THAT YOU ARE A FAILURE OR HAVE LET YOURSELF OR YOUR FAMILY DOWN: 0
5. POOR APPETITE OR OVEREATING: 0
10. IF YOU CHECKED OFF ANY PROBLEMS, HOW DIFFICULT HAVE THESE PROBLEMS MADE IT FOR YOU TO DO YOUR WORK, TAKE CARE OF THINGS AT HOME, OR GET ALONG WITH OTHER PEOPLE: 1

## 2022-06-13 ASSESSMENT — ENCOUNTER SYMPTOMS
RESPIRATORY NEGATIVE: 1
ALLERGIC/IMMUNOLOGIC NEGATIVE: 1
GASTROINTESTINAL NEGATIVE: 1
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
## 2022-07-28 ENCOUNTER — HOSPITAL ENCOUNTER (OUTPATIENT)
Dept: SLEEP CENTER | Age: 72
Discharge: HOME | End: 2022-07-31
Payer: MEDICARE

## 2022-07-28 PROCEDURE — 95806 SLEEP STUDY UNATT&RESP EFFT: CPT

## 2022-07-29 ENCOUNTER — TELEPHONE (OUTPATIENT)
Dept: NEUROLOGY | Age: 72
End: 2022-07-29

## 2022-07-29 NOTE — TELEPHONE ENCOUNTER
Pt is wanting a call back in reference to the referral to the neurosurgeon. She hasn't heard anything from the referral and tried to call and leave message and haven't heard anything. She is wanting to talk with Dr. Hilaria Olsen on options about this surgery.

## 2022-08-02 NOTE — TELEPHONE ENCOUNTER
Referral was sent to Dr Katie Waters office on 6/17. I called Dr Katie Waters office and they did receive the referral and have tried to call the patient to schedule an appt. Patient given Dr Katie Waters phone number to call and schedule.

## 2022-08-04 ENCOUNTER — TELEPHONE (OUTPATIENT)
Dept: FAMILY MEDICINE CLINIC | Facility: CLINIC | Age: 72
End: 2022-08-04

## 2022-08-04 ENCOUNTER — NURSE TRIAGE (OUTPATIENT)
Dept: OTHER | Facility: CLINIC | Age: 72
End: 2022-08-04

## 2022-08-04 DIAGNOSIS — E11.9 TYPE 2 DIABETES MELLITUS WITHOUT COMPLICATION, WITH LONG-TERM CURRENT USE OF INSULIN (HCC): Primary | ICD-10-CM

## 2022-08-04 DIAGNOSIS — Z79.4 TYPE 2 DIABETES MELLITUS WITHOUT COMPLICATION, WITH LONG-TERM CURRENT USE OF INSULIN (HCC): Primary | ICD-10-CM

## 2022-08-04 NOTE — TELEPHONE ENCOUNTER
Have her increase her insulin 4 units. If her fasting blood sugar is still above 150 then increase 3 units every 3 days. We can check her labs next week and see her in the office in 1-2 weeks. Diagnoses and all orders for this visit:    Type 2 diabetes mellitus without complication, with long-term current use of insulin (HCC)  -     Hemoglobin A1C; Future  -     Comprehensive Metabolic Panel;  Future

## 2022-08-04 NOTE — TELEPHONE ENCOUNTER
Received call from Ab Pandey at [facility] with [employer] Complaint. Subjective: Caller states \"weakness\"     Current Symptoms: feels weak is diabetic and checked her sugar was 195 today no other symptoms no nausea no h/a, no blurred vision, having memory issues, has been more active recently hx diabetes bradycardia and pacemaker, states her legs feels different no pain, no dizziness, pt having a hard time describing her symptoms just feels weak denies nay pain     Onset: 1 week ago; intermittent    Associated Symptoms: NA    Pain Severity: 0/10; N/A; none    Temperature: none       What has been tried: food diabetic drinks    LMP: NA Pregnant: NA    Recommended disposition: See in Office Today    Care advice provided, patient verbalizes understanding; denies any other questions or concerns; instructed to call back for any new or worsening symptoms. Patient/Caller agrees with recommended disposition; writer provided warm transfer to message  at [facility] for appointment scheduling     Attention Provider: Thank you for allowing me to participate in the care of your patient. The patient was connected to triage in response to information provided to the ECC/PSC. Please do not respond through this encounter as the response is not directed to a shared pool.        Reason for Disposition   MODERATE weakness (i.e., interferes with work, school, normal activities) and persists > 3 days    Protocols used: Weakness (Generalized) and Fatigue-ADULT-[place]

## 2022-08-04 NOTE — TELEPHONE ENCOUNTER
Patient stated that her blood sugar has been 190 (fasting) when  and up. Patient has been getting weak. Stated her BGL is not high all the time but has been happening more often. Patient stated she is taking medications as prescribed but she took med's late last night.

## 2022-08-09 ENCOUNTER — NURSE ONLY (OUTPATIENT)
Dept: FAMILY MEDICINE CLINIC | Facility: CLINIC | Age: 72
End: 2022-08-09

## 2022-08-09 DIAGNOSIS — E11.9 TYPE 2 DIABETES MELLITUS WITHOUT COMPLICATION, WITH LONG-TERM CURRENT USE OF INSULIN (HCC): ICD-10-CM

## 2022-08-09 DIAGNOSIS — Z79.4 TYPE 2 DIABETES MELLITUS WITHOUT COMPLICATION, WITH LONG-TERM CURRENT USE OF INSULIN (HCC): ICD-10-CM

## 2022-08-09 LAB
ALBUMIN SERPL-MCNC: 3.6 G/DL (ref 3.2–4.6)
ALBUMIN/GLOB SERPL: 1.1 {RATIO} (ref 1.2–3.5)
ALP SERPL-CCNC: 61 U/L (ref 50–136)
ALT SERPL-CCNC: 21 U/L (ref 12–65)
ANION GAP SERPL CALC-SCNC: 3 MMOL/L (ref 7–16)
AST SERPL-CCNC: 14 U/L (ref 15–37)
BILIRUB SERPL-MCNC: 0.5 MG/DL (ref 0.2–1.1)
BUN SERPL-MCNC: 13 MG/DL (ref 8–23)
CALCIUM SERPL-MCNC: 9.4 MG/DL (ref 8.3–10.4)
CHLORIDE SERPL-SCNC: 106 MMOL/L (ref 98–107)
CO2 SERPL-SCNC: 28 MMOL/L (ref 21–32)
CREAT SERPL-MCNC: 0.9 MG/DL (ref 0.6–1)
GLOBULIN SER CALC-MCNC: 3.3 G/DL (ref 2.3–3.5)
GLUCOSE SERPL-MCNC: 146 MG/DL (ref 65–100)
POTASSIUM SERPL-SCNC: 4.1 MMOL/L (ref 3.5–5.1)
PROT SERPL-MCNC: 6.9 G/DL (ref 6.3–8.2)
SODIUM SERPL-SCNC: 137 MMOL/L (ref 136–145)

## 2022-08-10 ENCOUNTER — OFFICE VISIT (OUTPATIENT)
Dept: FAMILY MEDICINE CLINIC | Facility: CLINIC | Age: 72
End: 2022-08-10
Payer: MEDICARE

## 2022-08-10 VITALS
OXYGEN SATURATION: 100 % | WEIGHT: 127.2 LBS | SYSTOLIC BLOOD PRESSURE: 184 MMHG | HEART RATE: 82 BPM | BODY MASS INDEX: 24.97 KG/M2 | TEMPERATURE: 97.2 F | RESPIRATION RATE: 17 BRPM | HEIGHT: 60 IN | DIASTOLIC BLOOD PRESSURE: 90 MMHG

## 2022-08-10 DIAGNOSIS — E78.2 MIXED HYPERLIPIDEMIA: ICD-10-CM

## 2022-08-10 DIAGNOSIS — Z79.4 TYPE 2 DIABETES MELLITUS WITH HYPERGLYCEMIA, WITH LONG-TERM CURRENT USE OF INSULIN (HCC): ICD-10-CM

## 2022-08-10 DIAGNOSIS — E11.65 TYPE 2 DIABETES MELLITUS WITH HYPERGLYCEMIA, WITH LONG-TERM CURRENT USE OF INSULIN (HCC): ICD-10-CM

## 2022-08-10 DIAGNOSIS — Z95.0 PACEMAKER: ICD-10-CM

## 2022-08-10 DIAGNOSIS — I48.0 PAROXYSMAL ATRIAL FIBRILLATION (HCC): ICD-10-CM

## 2022-08-10 DIAGNOSIS — Z78.9 STATIN INTOLERANCE: ICD-10-CM

## 2022-08-10 DIAGNOSIS — I10 PRIMARY HYPERTENSION: Primary | ICD-10-CM

## 2022-08-10 DIAGNOSIS — E03.9 PRIMARY HYPOTHYROIDISM: ICD-10-CM

## 2022-08-10 LAB
EST. AVERAGE GLUCOSE BLD GHB EST-MCNC: 171 MG/DL
HBA1C MFR BLD: 7.6 % (ref 4.8–5.6)

## 2022-08-10 PROCEDURE — G8420 CALC BMI NORM PARAMETERS: HCPCS | Performed by: FAMILY MEDICINE

## 2022-08-10 PROCEDURE — G8427 DOCREV CUR MEDS BY ELIG CLIN: HCPCS | Performed by: FAMILY MEDICINE

## 2022-08-10 PROCEDURE — 1090F PRES/ABSN URINE INCON ASSESS: CPT | Performed by: FAMILY MEDICINE

## 2022-08-10 PROCEDURE — 3017F COLORECTAL CA SCREEN DOC REV: CPT | Performed by: FAMILY MEDICINE

## 2022-08-10 PROCEDURE — 2022F DILAT RTA XM EVC RTNOPTHY: CPT | Performed by: FAMILY MEDICINE

## 2022-08-10 PROCEDURE — 3051F HG A1C>EQUAL 7.0%<8.0%: CPT | Performed by: FAMILY MEDICINE

## 2022-08-10 PROCEDURE — 99215 OFFICE O/P EST HI 40 MIN: CPT | Performed by: FAMILY MEDICINE

## 2022-08-10 PROCEDURE — 1123F ACP DISCUSS/DSCN MKR DOCD: CPT | Performed by: FAMILY MEDICINE

## 2022-08-10 PROCEDURE — 1036F TOBACCO NON-USER: CPT | Performed by: FAMILY MEDICINE

## 2022-08-10 PROCEDURE — G8400 PT W/DXA NO RESULTS DOC: HCPCS | Performed by: FAMILY MEDICINE

## 2022-08-10 RX ORDER — LOSARTAN POTASSIUM 25 MG/1
37.5 TABLET ORAL 2 TIMES DAILY
Qty: 270 TABLET | Refills: 3 | Status: SHIPPED | OUTPATIENT
Start: 2022-08-10 | End: 2022-09-09 | Stop reason: DRUGHIGH

## 2022-08-10 RX ORDER — EZETIMIBE 10 MG/1
10 TABLET ORAL DAILY
Qty: 90 TABLET | Refills: 1 | Status: SHIPPED | OUTPATIENT
Start: 2022-08-10 | End: 2022-09-09

## 2022-08-10 RX ORDER — LEVOTHYROXINE AND LIOTHYRONINE 19; 4.5 UG/1; UG/1
30 TABLET ORAL DAILY
Qty: 90 TABLET | Refills: 3 | Status: SHIPPED | OUTPATIENT
Start: 2022-08-10

## 2022-08-10 SDOH — ECONOMIC STABILITY: FOOD INSECURITY: WITHIN THE PAST 12 MONTHS, THE FOOD YOU BOUGHT JUST DIDN'T LAST AND YOU DIDN'T HAVE MONEY TO GET MORE.: NEVER TRUE

## 2022-08-10 SDOH — ECONOMIC STABILITY: FOOD INSECURITY: WITHIN THE PAST 12 MONTHS, YOU WORRIED THAT YOUR FOOD WOULD RUN OUT BEFORE YOU GOT MONEY TO BUY MORE.: NEVER TRUE

## 2022-08-10 ASSESSMENT — PATIENT HEALTH QUESTIONNAIRE - PHQ9
SUM OF ALL RESPONSES TO PHQ QUESTIONS 1-9: 0
5. POOR APPETITE OR OVEREATING: 0
10. IF YOU CHECKED OFF ANY PROBLEMS, HOW DIFFICULT HAVE THESE PROBLEMS MADE IT FOR YOU TO DO YOUR WORK, TAKE CARE OF THINGS AT HOME, OR GET ALONG WITH OTHER PEOPLE: 0
2. FEELING DOWN, DEPRESSED OR HOPELESS: 0
2. FEELING DOWN, DEPRESSED OR HOPELESS: 0
6. FEELING BAD ABOUT YOURSELF - OR THAT YOU ARE A FAILURE OR HAVE LET YOURSELF OR YOUR FAMILY DOWN: 0
SUM OF ALL RESPONSES TO PHQ9 QUESTIONS 1 & 2: 0
SUM OF ALL RESPONSES TO PHQ QUESTIONS 1-9: 0
3. TROUBLE FALLING OR STAYING ASLEEP: 0
9. THOUGHTS THAT YOU WOULD BE BETTER OFF DEAD, OR OF HURTING YOURSELF: 0
4. FEELING TIRED OR HAVING LITTLE ENERGY: 0
8. MOVING OR SPEAKING SO SLOWLY THAT OTHER PEOPLE COULD HAVE NOTICED. OR THE OPPOSITE, BEING SO FIGETY OR RESTLESS THAT YOU HAVE BEEN MOVING AROUND A LOT MORE THAN USUAL: 0
SUM OF ALL RESPONSES TO PHQ9 QUESTIONS 1 & 2: 0
SUM OF ALL RESPONSES TO PHQ QUESTIONS 1-9: 0
SUM OF ALL RESPONSES TO PHQ QUESTIONS 1-9: 0
1. LITTLE INTEREST OR PLEASURE IN DOING THINGS: 0
1. LITTLE INTEREST OR PLEASURE IN DOING THINGS: 0
7. TROUBLE CONCENTRATING ON THINGS, SUCH AS READING THE NEWSPAPER OR WATCHING TELEVISION: 0
SUM OF ALL RESPONSES TO PHQ QUESTIONS 1-9: 0

## 2022-08-10 ASSESSMENT — ENCOUNTER SYMPTOMS
COUGH: 0
GASTROINTESTINAL NEGATIVE: 1
SHORTNESS OF BREATH: 0

## 2022-08-10 ASSESSMENT — SOCIAL DETERMINANTS OF HEALTH (SDOH): HOW HARD IS IT FOR YOU TO PAY FOR THE VERY BASICS LIKE FOOD, HOUSING, MEDICAL CARE, AND HEATING?: NOT HARD AT ALL

## 2022-08-10 NOTE — PROGRESS NOTES
Elder Deleon (:  1950) is a 67 y.o. female,Established patient, here for evaluation of the following chief complaint(s):  Blood Sugar Problem (Thinks this happened because she forgot to take her insulin ),   Other (Blood pressure is running higher lately wants to take 1.5 tablets of losartan again, feels like her BP was more well controlled on this dosage ), Fatigue            ASSESSMENT/PLAN:  1. Primary hypertension  -     losartan (COZAAR) 25 MG tablet; Take 1.5 tablets by mouth in the morning and 1.5 tablets before bedtime. , Disp-270 tablet, R-3Normal  2. Primary hypothyroidism  -     thyroid (ARMOUR) 30 MG tablet; Take 1 tablet by mouth in the morning., Disp-90 tablet, R-3Normal  3. Pacemaker  4. Type 2 diabetes mellitus with hyperglycemia, with long-term current use of insulin (HCC)  -     insulin glargine (LANTUS;BASAGLAR) 100 UNIT/ML injection pen; Inject 28 Units into the skin in the morning., Disp-5 pen, R-5Normal  5. Paroxysmal atrial fibrillation (HCC)  -     apixaban (ELIQUIS) 5 MG TABS tablet; Take 1 tablet by mouth in the morning and 1 tablet before bedtime. , Disp-180 tablet, R-3Normal  6. Statin intolerance  7. Mixed hyperlipidemia  -     ezetimibe (ZETIA) 10 MG tablet; Take 1 tablet by mouth in the morning. TAKE 1 TABLET DAILY FOR HIGH CHOLESTEROL., Disp-90 tablet, R-1Normal    Concern for elevated BP; off cholesterol medication; high risk for heart attack and stroke. Counseled her that she needs to make sure she restarts Zetia; increase losartan. She is going to check blood pressure at home and make sure its under 150/80. Return in about 3 months (around 11/10/2022) for Labs one week before, EOV. Subjective   SUBJECTIVE/OBJECTIVE:  HPI      68 yo female wth underlying a-fib, GABRIEL, uncontrolled HTN,      1) hypertension/atrial fibrillation: Has had a pacemaker implanted.   Blood pressure at home: 341-375 systolic;  diastolic  Today in office elevated to 200+ initially; repeat was 184/90. 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 She is willing to increase losartan to 37.5 mg BID. Wont increase to 50 mg BID. Stopped metoprolol due to side effects. Declines additional medication at this time. 2)  IDDM:  Lab Results   Component Value Date    LABA1C 7.6 (H) 08/09/2022     Lab Results   Component Value Date     08/09/2022   Lantus: 28 units daily. At home her BGLs are  120-180  Cannot tolerate metformin. 3) hypothyroidism  Lab Results   Component Value Date    TSH 0.551 01/14/2022   On same dose of [place] thyroid (30 mg) for some time. She stopped taking zetia. Her cholesterol is elevated     CV RISK score 40%. Review of Systems   Constitutional:  Negative for activity change, appetite change, fatigue and fever. Respiratory:  Negative for cough and shortness of breath. Cardiovascular: Negative. Gastrointestinal: Negative. Musculoskeletal: Negative. Skin: Negative. Neurological:  Positive for headaches. Objective   Physical Exam  Vitals and nursing note reviewed. Constitutional:       General: She is not in acute distress. Appearance: She is not ill-appearing or toxic-appearing. HENT:      Head: Normocephalic and atraumatic. Right Ear: Tympanic membrane and ear canal normal.      Left Ear: Tympanic membrane and ear canal normal.   Cardiovascular:      Rate and Rhythm: Normal rate and regular rhythm. Pulmonary:      Effort: Pulmonary effort is normal.      Breath sounds: Normal breath sounds. Neurological:      General: No focal deficit present. Mental Status: She is alert and oriented to person, place, and time. Mental status is at baseline. Psychiatric:         Mood and Affect: Mood normal.         Behavior: Behavior normal.         Thought Content:  Thought content normal.         Judgment: Judgment normal.        Vitals: 08/10/22 1710   BP: (!) 184/90   Pulse:    Resp:    Temp:    SpO2:      Lab Results   Component Value Date     08/09/2022    K 4.1 08/09/2022     08/09/2022    CO2 28 08/09/2022    BUN 13 08/09/2022    CREATININE 0.90 08/09/2022    GLUCOSE 146 (H) 08/09/2022    CALCIUM 9.4 08/09/2022    PROT 6.9 08/09/2022    LABALBU 3.6 08/09/2022    BILITOT 0.5 08/09/2022    ALKPHOS 61 08/09/2022    AST 14 (L) 08/09/2022    ALT 21 08/09/2022    LABGLOM >60 08/09/2022    GFRAA >60 08/09/2022    AGRATIO 1.8 04/18/2022    GLOB 3.3 08/09/2022       Lab Results   Component Value Date    LABA1C 7.6 (H) 08/09/2022     Lab Results   Component Value Date     08/09/2022     Lab Results   Component Value Date    CHOL 250 (H) 04/18/2022    CHOL 272 (H) 01/14/2022    CHOL 274 (H) 10/18/2021     Lab Results   Component Value Date    TRIG 41 04/18/2022    TRIG 164 (H) 01/14/2022    TRIG 207 (H) 10/18/2021     Lab Results   Component Value Date    HDL 94 04/18/2022    HDL 60 01/14/2022    HDL 54 10/18/2021     Lab Results   Component Value Date    LDLCALC 150 (H) 04/18/2022    LDLCALC 182 (H) 01/14/2022    LDLCALC 181 (H) 10/18/2021     Lab Results   Component Value Date    LABVLDL 40 03/05/2020    LABVLDL 33 11/19/2019    VLDL 6 04/18/2022    VLDL 30 01/14/2022    VLDL 39 10/18/2021     No results found for: CHOLHDLRATIO    On this date 8/10/2022 I have spent 40 minutes reviewing previous notes, test results and face to face with the patient discussing the diagnosis and importance of compliance with the treatment plan as well as documenting on the day of the visit. An electronic signature was used to authenticate this note.     --Wade Jacobs MD, MD

## 2022-08-22 ENCOUNTER — OFFICE VISIT (OUTPATIENT)
Dept: SLEEP MEDICINE | Age: 72
End: 2022-08-22
Payer: MEDICARE

## 2022-08-22 VITALS
OXYGEN SATURATION: 97 % | DIASTOLIC BLOOD PRESSURE: 90 MMHG | HEIGHT: 61 IN | BODY MASS INDEX: 23.94 KG/M2 | TEMPERATURE: 97.2 F | WEIGHT: 126.8 LBS | HEART RATE: 89 BPM | RESPIRATION RATE: 14 BRPM | SYSTOLIC BLOOD PRESSURE: 164 MMHG

## 2022-08-22 DIAGNOSIS — G47.8 NON-RESTORATIVE SLEEP: ICD-10-CM

## 2022-08-22 DIAGNOSIS — G47.33 OSA (OBSTRUCTIVE SLEEP APNEA): Primary | ICD-10-CM

## 2022-08-22 DIAGNOSIS — G47.34 NOCTURNAL HYPOXEMIA: ICD-10-CM

## 2022-08-22 PROCEDURE — 1123F ACP DISCUSS/DSCN MKR DOCD: CPT | Performed by: NURSE PRACTITIONER

## 2022-08-22 PROCEDURE — 99214 OFFICE O/P EST MOD 30 MIN: CPT | Performed by: NURSE PRACTITIONER

## 2022-08-22 ASSESSMENT — SLEEP AND FATIGUE QUESTIONNAIRES
HOW LIKELY ARE YOU TO NOD OFF OR FALL ASLEEP WHILE SITTING QUIETLY AFTER LUNCH WITHOUT ALCOHOL: 0
HOW LIKELY ARE YOU TO NOD OFF OR FALL ASLEEP WHILE WATCHING TV: 0
HOW LIKELY ARE YOU TO NOD OFF OR FALL ASLEEP WHILE LYING DOWN TO REST IN THE AFTERNOON WHEN CIRCUMSTANCES PERMIT: 1
HOW LIKELY ARE YOU TO NOD OFF OR FALL ASLEEP WHILE SITTING AND TALKING TO SOMEONE: 0
HOW LIKELY ARE YOU TO NOD OFF OR FALL ASLEEP IN A CAR, WHILE STOPPED FOR A FEW MINUTES IN TRAFFIC: 0
HOW LIKELY ARE YOU TO NOD OFF OR FALL ASLEEP WHILE SITTING INACTIVE IN A PUBLIC PLACE: 0
ESS TOTAL SCORE: 1
HOW LIKELY ARE YOU TO NOD OFF OR FALL ASLEEP WHILE SITTING AND READING: 0
HOW LIKELY ARE YOU TO NOD OFF OR FALL ASLEEP WHEN YOU ARE A PASSENGER IN A CAR FOR AN HOUR WITHOUT A BREAK: 0

## 2022-08-22 NOTE — PATIENT INSTRUCTIONS
Start CPAP therapy at 5-10 cm H2O with nightly compliance  Supplies ordered today.   Follow up in 4 months for compliance and re-evaluation, sooner if needed

## 2022-09-02 ENCOUNTER — NURSE TRIAGE (OUTPATIENT)
Dept: OTHER | Facility: CLINIC | Age: 72
End: 2022-09-02

## 2022-09-02 ENCOUNTER — TELEPHONE (OUTPATIENT)
Dept: FAMILY MEDICINE CLINIC | Facility: CLINIC | Age: 72
End: 2022-09-02

## 2022-09-06 NOTE — TELEPHONE ENCOUNTER
Can increase the losartan to 50 mg and monitor.   Let us know if not improving
PT called and said her BP and Stress level is high due to working with her .  PT would like to know if she can increase her losartan from 37.5 to 50 mg which was the originally prescribed amount by her previous MD.
PT states that she checks her blood pressure everyday, Pt states that her bp is always elevated but has been running higher due to stress levels . PT states it has been 150-75/ 90's. Pt is wanting to increase her Losartan to 50mg.  Pt wanted to check with a Dr and ensure this adjustment is okay
Pt informed
no

## 2022-09-07 ENCOUNTER — OFFICE VISIT (OUTPATIENT)
Dept: ORTHOPEDIC SURGERY | Age: 72
End: 2022-09-07
Payer: MEDICARE

## 2022-09-07 DIAGNOSIS — M19.012 OSTEOARTHRITIS OF LEFT AC (ACROMIOCLAVICULAR) JOINT: Primary | ICD-10-CM

## 2022-09-07 DIAGNOSIS — M25.512 LEFT SHOULDER PAIN, UNSPECIFIED CHRONICITY: ICD-10-CM

## 2022-09-07 DIAGNOSIS — M75.02 ADHESIVE CAPSULITIS OF LEFT SHOULDER: ICD-10-CM

## 2022-09-07 PROBLEM — H52.223 MYOPIA OF BOTH EYES WITH REGULAR ASTIGMATISM AND PRESBYOPIA: Status: ACTIVE | Noted: 2019-06-18

## 2022-09-07 PROBLEM — H01.006 ANGULAR BLEPHARITIS OF BOTH EYES: Status: ACTIVE | Noted: 2018-10-03

## 2022-09-07 PROBLEM — H01.003 ANGULAR BLEPHARITIS OF BOTH EYES: Status: ACTIVE | Noted: 2018-10-03

## 2022-09-07 PROBLEM — H52.13 MYOPIA OF BOTH EYES WITH REGULAR ASTIGMATISM AND PRESBYOPIA: Status: ACTIVE | Noted: 2019-06-18

## 2022-09-07 PROBLEM — H52.4 MYOPIA OF BOTH EYES WITH REGULAR ASTIGMATISM AND PRESBYOPIA: Status: ACTIVE | Noted: 2019-06-18

## 2022-09-07 PROBLEM — E11.9 CONTROLLED TYPE 2 DIABETES MELLITUS WITHOUT COMPLICATION, WITHOUT LONG-TERM CURRENT USE OF INSULIN (HCC): Status: ACTIVE | Noted: 2018-06-14

## 2022-09-07 PROBLEM — H43.813 POSTERIOR VITREOUS DETACHMENT, BOTH EYES: Status: ACTIVE | Noted: 2019-06-18

## 2022-09-07 PROBLEM — H01.116: Status: ACTIVE | Noted: 2018-10-03

## 2022-09-07 PROCEDURE — 1090F PRES/ABSN URINE INCON ASSESS: CPT | Performed by: ORTHOPAEDIC SURGERY

## 2022-09-07 PROCEDURE — 3017F COLORECTAL CA SCREEN DOC REV: CPT | Performed by: ORTHOPAEDIC SURGERY

## 2022-09-07 PROCEDURE — 1036F TOBACCO NON-USER: CPT | Performed by: ORTHOPAEDIC SURGERY

## 2022-09-07 PROCEDURE — 99214 OFFICE O/P EST MOD 30 MIN: CPT | Performed by: ORTHOPAEDIC SURGERY

## 2022-09-07 PROCEDURE — 1123F ACP DISCUSS/DSCN MKR DOCD: CPT | Performed by: ORTHOPAEDIC SURGERY

## 2022-09-07 PROCEDURE — G8420 CALC BMI NORM PARAMETERS: HCPCS | Performed by: ORTHOPAEDIC SURGERY

## 2022-09-07 PROCEDURE — G8400 PT W/DXA NO RESULTS DOC: HCPCS | Performed by: ORTHOPAEDIC SURGERY

## 2022-09-07 PROCEDURE — G8427 DOCREV CUR MEDS BY ELIG CLIN: HCPCS | Performed by: ORTHOPAEDIC SURGERY

## 2022-09-07 RX ORDER — AMLODIPINE BESYLATE 10 MG/1
10 TABLET ORAL DAILY
COMMUNITY
End: 2022-09-09

## 2022-09-07 RX ORDER — PEN NEEDLE, DIABETIC 32GX 5/32"
NEEDLE, DISPOSABLE MISCELLANEOUS
COMMUNITY
Start: 2022-09-03

## 2022-09-07 NOTE — PATIENT INSTRUCTIONS
Patient Education        Frozen Shoulder: Exercises  Introduction  Here are some examples of exercises for you to try. The exercises may be suggested for a condition or for rehabilitation. Start each exercise slowly. Ease off the exercises if you start to have pain. You will be told when to start these exercises and which ones will work bestfor you. How to do the exercises  Neck stretches    Look straight ahead, and tip your right ear to your right shoulder. Do not let your left shoulder rise up as you tip your head to the right. Hold 15 to 30 seconds. Tilt your head to the left. Do not let your right shoulder rise up as you tip your head to the left. Hold for 15 to 30 seconds. Repeat 2 to 4 times to each side. Shoulder rolls    Sit comfortably with your feet shoulder-width apart. You can also do this exercise while standing. Roll your shoulders up, then back, and then down in a smooth, circular motion. Repeat 2 to 4 times. Shoulder flexion (lying down)    For this exercise, you will need a wand. To make a wand, use a piece of PVC pipe or a broom handle with the broom removed. Make the wand about a foot widerthan your shoulders. Lie on your back, holding a wand with your hands. Your palms should face down as you hold the wand. Place your hands slightly wider than your shoulders. Keeping your elbows straight, slowly raise your arms over your head until you feel a stretch in your shoulders, upper back, and chest.  Hold 15 to 30 seconds. Repeat 2 to 4 times. Shoulder rotation (lying down)    To make a wand, use a piece of PVC pipe or a broom handle with the broomremoved. Make the wand about a foot wider than your shoulders. Lie on your back and hold a wand in both hands with your elbows bent and your palms up. Keeping your elbows close to your body, move the wand across your body toward the arm that has pain. Hold for 15 to 30 seconds. Repeat 2 to 4 times.   Shoulder internal rotation with towel    Roll up a towel lengthwise. Hold the towel above and behind your head with the arm that is not sore. With your sore arm, reach behind your back and grasp the towel. Using the arm above your head, pull the towel upward until you feel a stretch on the front and outside of your sore shoulder. Hold 15 to 30 seconds. Relax and move the towel back down to the starting position. Repeat 2 to 4 times. Shoulder blade squeeze    While standing with your arms at your sides, squeeze your shoulder blades together. Do not raise your shoulders up as you are squeezing. Hold for 6 seconds. Repeat 8 to 12 times. Follow-up care is a key part of your treatment and safety. Be sure to make and go to all appointments, and call your doctor if you are having problems. It's also a good idea to know your test results and keep alist of the medicines you take. Where can you learn more? Go to [email or web address]. org and sign in to your [employer] account. Enter [phone] in the [employer] box to learn more about \"Frozen Shoulder: Exercises. \"     If you do not have an account, please click on the \"Sign Up Now\" link. Current as of: March 9, 2022               Content Version: 13.3  © 2006-2022 [employer]wise[employer] Inc[employer]. Care instructions adapted under license by [facility] ([facility]). If you have questions about a medical condition or this instruction, always ask your healthcare professional. [place] [address] any warranty or liability for your use of this information. Frozen Shoulder  Frozen shoulder, also called adhesive capsulitis, causes pain and stiffness in the shoulder. Over time, the shoulder becomes very hard to move. After a period of worsening symptoms, frozen shoulder tends to get better, although full recovery may take up to 3 years. Physical therapy, with a focus on shoulder flexibility, is the primary treatment recommendation for frozen shoulder.     Frozen shoulder most commonly affects people between the ages of 36 and 61, and occurs in women more often than men. In addition, people with diabetes are at an increased risk for developing frozen shoulder. Anatomy  Your shoulder is a ball-and-socket joint made up of three bones: your upper arm bone (humerus), your shoulder blade (scapula), and your collarbone (clavicle). The head of the upper arm bone fits into a shallow socket in your shoulder blade. Strong connective tissue, called the shoulder capsule, surrounds the joint. To help your shoulder move more easily, synovial fluid lubricates the shoulder capsule and the joint. Description  In frozen shoulder, the shoulder capsule thickens and becomes stiff and tight. Thick bands of tissue -- called adhesions -- develop. In many cases, there is less synovial fluid in the joint. The hallmark signs of this condition are severe pain and being unable to move your shoulder -- either on your own or with the help of someone else. It develops in three stages:    Stage 1: Freezing  In the \"freezing\" stage, you slowly have more and more pain. As the pain worsens, your shoulder loses range of motion. Freezing typically lasts from 6 weeks to 9 months. Stage 2: Frozen  Painful symptoms may actually improve during this stage, but the stiffness remains. During the 4 to 6 months of the \"frozen\" stage, daily activities may be very difficult. Stage 3: Thawing  Shoulder motion slowly improves during the \"thawing\" stage. Complete return to normal or close to normal strength and motion typically takes from 6 months to 2 years. Cause  The causes of frozen shoulder are not fully understood. There is no clear connection to arm dominance or occupation. A few factors may put you more at risk for developing frozen shoulder. Diabetes. Frozen shoulder occurs much more often in people with diabetes. The reason for this is not known.  In addition, diabetic patients with frozen shoulder tend to have a greater degree of stiffness that continues for a longer time before \"thawing. \"    Other diseases. Some additional medical problems associated with frozen shoulder include hypothyroidism, hyperthyroidism, Parkinson's disease, and cardiac disease. Immobilization. Frozen shoulder can develop after a shoulder has been immobilized for a period of time due to surgery, a fracture, or other injury. Having patients move their shoulders soon after injury or surgery is one measure prescribed to prevent frozen shoulder. Symptoms  Pain from frozen shoulder is usually dull or aching. It is typically worse early in the course of the disease and when you move your arm. The pain is usually located over the outer shoulder area and sometimes the upper arm. Doctor Examination    Physical Examination  After discussing your symptoms and medical history, your doctor will examine your shoulder. Your doctor will move your shoulder carefully in all directions to see if movement is limited and if pain occurs with the motion. The range of motion when someone else moves your shoulder is called \"passive range of motion. \" Your doctor will compare this to the range of motion you display when you move your shoulder on your own (\"active range of motion\"). People with frozen shoulder have limited range of motion both actively and passively. Imaging Tests  Other tests that may help your doctor rule out other causes of stiffness and pain include:    X-rays. Dense structures, such as bone, show up clearly on x-rays. X-rays may show other problems in your shoulder, such as arthritis. Magnetic resonance imaging (MRI) and ultrasound. These studies can create better images of soft tissues. They are not required to diagnose frozen shoulder, however, they may help to identify other problems in your shoulder, such as a torn rotator cuff. Treatment  Frozen shoulder generally gets better over time, although it may take up to 3 years.  The focus of treatment is to control pain and restore motion and strength through physical therapy. Nonsurgical Treatment  Most people with frozen shoulder improve with relatively simple treatments to control pain and restore motion. Non-steroidal anti-inflammatory medicines. Drugs like aspirin and ibuprofen reduce pain and swelling. Steroid injections. Cortisone is a powerful anti-inflammatory medicine that is injected directly into your shoulder joint. Hydrodilatation. If your symptoms are not relieved by other nonsurgical methods, your doctor may recommend hydrodilatation. This procedure involves gently injecting a large volume of sterile fluid into the shoulder joint to expand and stretch the shoulder joint capsule. Hydrodilatation is conducted by a radiologist who uses imaging to guide the placement of fluid. Physical therapy. Specific exercises will help restore motion. These may be done under the supervision of a physical therapist or via a home program. Therapy includes stretching or range of motion exercises for the shoulder. Sometimes heat is used to help loosen the shoulder up before stretching. Below are examples of some of the exercises that might be recommended. External rotation -- passive stretch.  a doorway and bend your affected arm's elbow to 90 degrees to reach the doorjamb. Keep your hand in place and rotate your body as shown in the illustration. Hold for 30 seconds. Relax and repeat. Forward flexion -- supine position. Lie on your back with your legs straight. Use your unaffected arm to lift your affected arm overhead until you feel a gentle stretch. Hold for 15 seconds and slowly lower to start position. Relax and repeat. Crossover arm stretch. Gently pull one arm across your chest just below your chin as far as possible without causing pain. Hold for 30 seconds. Relax and repeat.       Surgical Treatment  If your symptoms are not relieved by therapy and other conservative methods, you and your doctor may discuss surgery. It is important to talk with your doctor about your potential for recovery continuing with simple treatments, and the risks involved with surgery. Surgery for frozen shoulder is typically offered during \"Stage 2: Frozen. \" The goal of surgery is to stretch and release the stiffened joint capsule. The most common methods include manipulation under anesthesia and shoulder arthroscopy. Manipulation under anesthesia. During this procedure, you are put to sleep. Your doctor will force your shoulder to move which causes the capsule and scar tissue to stretch or tear. This releases the tightening and increases range of motion. Shoulder arthroscopy. In this procedure, your doctor will cut through tight portions of the joint capsule. This is done using pencilsized instruments inserted through small incisions around your shoulder. In many cases, manipulation and arthroscopy are used in combination to obtain maximum results. Most patients have good outcomes with these procedures. Recovery. After surgery, physical therapy is necessary to maintain the motion that was achieved with surgery. Recovery times vary, from 6 weeks to 3 months. Although it is a slow process, your commitment to therapy is the most important factor in returning to all the activities you enjoy. Long-term outcomes after surgery are generally good, with most patients having reduced or no pain and improved range of motion. In some cases, however, even after several years, the motion does not return completely and some degree of stiffness remains. Diabetic patients often have some degree of continued shoulder stiffness after surgery. Although uncommon, frozen shoulder can recur, especially if a contributing factor like diabetes is still present.

## 2022-09-07 NOTE — PROGRESS NOTES
Name: Darren Serrano  YOB: 1950  Gender: female  MRN: [id number]    CC:   Chief Complaint   Patient presents with    Follow-up     Recheck left shoulder        HPI: Patient presents for follow-up of left shoulder pain. Patient has had left shoulder injection on 3-28-22. Patient since her last visit she has had diagnosis of atherosclerosis and was told it was not wise to get repeat injections. She also notes that her cardiologist told her not to reach overhead given her pacemaker although this was done in November 2021. States this is why she has not done physical therapy. Patient is interested to know why her shoulder has not gotten better over the last 10 months. From neurology note:  there is residual damage seen in the bilateral occipital parietal lobes but with significant improvement. My biggest concern is that the CTA does show significant pathology in the left vertebral artery there is multifocal moderate high-grade stenosis    Allergies   Allergen Reactions    Clindamycin Anaphylaxis     Other reaction(s): Unknown-Unspecified    Gold      Other reaction(s): Unknown-Unspecified    Hydrocortisone      Other reaction(s): Unknown-Unspecified    Nickel      Other reaction(s): Unknown-Unspecified    Vitamin E      Other reaction(s): Unknown-Unspecified    Amlodipine Diarrhea    Metformin Nausea Only    Amoxicillin Other (See Comments)     Tolerates up to 500 mg dose OK  Other reaction(s): Other- (not listed) - Allergy  Other reaction(s): Vertigo    Cortisone Other (See Comments)     Other reaction(s): Other- (not listed) - Allergy  Other reaction(s): Other (comments)    Linagliptin Other (See Comments)     floaters    Methylcellulose Other (See Comments)     Other reaction(s): Other- (not listed) - Allergy  Other reaction(s):  Other (comments)     Past Medical History:   Diagnosis Date    Adverse effect of anesthesia     patient states she is slower to wake up than other and feels groggy longer    Anxiety     Atrial fibrillation (HCC)     Depression     Diabetes ([address]     Hypercholesterolemia     Hypertension     IBS (irritable bowel syndrome)     Lumbar disc herniation     Pacemaker     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 ([address]     Sjogren's syndrome (HCC)     dry eye and fatigue are her symptoms    Sleep apnea     patient does not use a c-pap    SSS (sick sinus syndrome) ([address]     TBI (traumatic brain injury) ([address]     Thyroid disease     hypothyroidism     Past Surgical History:   Procedure Laterality Date    CHOLECYSTECTOMY, LAPAROSCOPIC  11/2007    COLONOSCOPY N/A 5/5/2017    COLONOSCOPY/ 23 performed by Ankur Min MD at [facility] ENDOSCOPY    ORTHOPEDIC SURGERY      '18 laser treaatment lumber disc herniation    PACEMAKER  11/01/2021    RETINAL DETACHMENT SURGERY Right 09/2021    TUBAL LIGATION  1976     Family History   Problem Relation Age of Onset    Cancer Father         [place]    Cancer Paternal Aunt         ovarian    Heart Disease Brother     Breast Cancer Neg Hx     Cancer Paternal Grandfather         [place]    Heart Disease Father     Stroke Father     Diabetes Brother         DMII     Social History     Socioeconomic History    Marital status:      Spouse name: Not on file    Number of children: Not on file    Years of education: Not on file    Highest education level: Not on file   Occupational History    Not on file   Tobacco Use    Smoking status: Never    Smokeless tobacco: Never   Vaping Use    Vaping Use: Never used   Substance and Sexual Activity    Alcohol use: No    Drug use: No    Sexual activity: Not Currently   Other Topics Concern    Not on file   Social History Narrative    Not on file     Social Determinants of Health     Financial Resource Strain: Low Risk     Difficulty of Paying Living Expenses: Not hard at all   Food Insecurity: No Food Insecurity    Worried About [address] in the Last Year: Never true    [address] in the Last Year: Never true   Transportation Needs: Not on file   Physical Activity: Not on file   Stress: Not on file   Social Connections: Not on file   Intimate Partner Violence: Not on file   Housing Stability: Not on file        No flowsheet data found. Review of Systems  Non-contributory    PE left shoulder:     , abd 90, ER 30. Internal rotation L5 pain with strength testing.5/5 abduction, 5/5 ER, 5/5 IR. Distal motor function, sensation and pulses intact. Positive impingement signs. Xray: Not indicated    A/Plan:     ICD-10-CM    1. Osteoarthritis of left AC (acromioclavicular) joint  M19.012       2. Left shoulder pain, unspecified chronicity  M25.512       3. Adhesive capsulitis of left shoulder  M75.02          Discussed she has chronic frozen shoulder right now. Normally treatment would include therapy but she has been restricted because of recommendations from cardiology. She is about 10 months out I believe from her pacemaker placement so she is going to check in when she sees them in 2 weeks to see if they will allow her to do exercises to try and combat that. She understands that it can take quite some time for this to resolve. I do not think she is at a point where I would recommend manipulation    Recommend therapy to evaluate and treat current complaints and pathology. A home exercise program was also discussed with the patient. And exercises are placed in my chart. She is going to check with her cardiologist and if she is cleared she can have her therapy order sent to wherever she can go. Return in about 10 weeks (around 11/16/2022).         Alexandra Kerr MD  09/07/22

## 2022-09-09 ENCOUNTER — OFFICE VISIT (OUTPATIENT)
Dept: FAMILY MEDICINE CLINIC | Facility: CLINIC | Age: 72
End: 2022-09-09
Payer: MEDICARE

## 2022-09-09 VITALS
HEART RATE: 93 BPM | TEMPERATURE: 97.2 F | SYSTOLIC BLOOD PRESSURE: 160 MMHG | OXYGEN SATURATION: 98 % | BODY MASS INDEX: 23.79 KG/M2 | RESPIRATION RATE: 16 BRPM | WEIGHT: 126 LBS | HEIGHT: 61 IN | DIASTOLIC BLOOD PRESSURE: 84 MMHG

## 2022-09-09 DIAGNOSIS — Z79.4 TYPE 2 DIABETES MELLITUS WITH HYPERGLYCEMIA, WITH LONG-TERM CURRENT USE OF INSULIN (HCC): ICD-10-CM

## 2022-09-09 DIAGNOSIS — I10 HYPERTENSION, UNCONTROLLED: Primary | ICD-10-CM

## 2022-09-09 DIAGNOSIS — F32.1 DEPRESSION, MAJOR, SINGLE EPISODE, MODERATE (HCC): ICD-10-CM

## 2022-09-09 DIAGNOSIS — E11.65 TYPE 2 DIABETES MELLITUS WITH HYPERGLYCEMIA, WITH LONG-TERM CURRENT USE OF INSULIN (HCC): ICD-10-CM

## 2022-09-09 PROCEDURE — 3017F COLORECTAL CA SCREEN DOC REV: CPT | Performed by: PHYSICIAN ASSISTANT

## 2022-09-09 PROCEDURE — 2022F DILAT RTA XM EVC RTNOPTHY: CPT | Performed by: PHYSICIAN ASSISTANT

## 2022-09-09 PROCEDURE — 1123F ACP DISCUSS/DSCN MKR DOCD: CPT | Performed by: PHYSICIAN ASSISTANT

## 2022-09-09 PROCEDURE — G8427 DOCREV CUR MEDS BY ELIG CLIN: HCPCS | Performed by: PHYSICIAN ASSISTANT

## 2022-09-09 PROCEDURE — 3051F HG A1C>EQUAL 7.0%<8.0%: CPT | Performed by: PHYSICIAN ASSISTANT

## 2022-09-09 PROCEDURE — 1036F TOBACCO NON-USER: CPT | Performed by: PHYSICIAN ASSISTANT

## 2022-09-09 PROCEDURE — G8420 CALC BMI NORM PARAMETERS: HCPCS | Performed by: PHYSICIAN ASSISTANT

## 2022-09-09 PROCEDURE — G8400 PT W/DXA NO RESULTS DOC: HCPCS | Performed by: PHYSICIAN ASSISTANT

## 2022-09-09 PROCEDURE — 1090F PRES/ABSN URINE INCON ASSESS: CPT | Performed by: PHYSICIAN ASSISTANT

## 2022-09-09 PROCEDURE — 99214 OFFICE O/P EST MOD 30 MIN: CPT | Performed by: PHYSICIAN ASSISTANT

## 2022-09-09 RX ORDER — AMLODIPINE BESYLATE 2.5 MG/1
2.5 TABLET ORAL DAILY
Qty: 30 TABLET | Refills: 1 | Status: SHIPPED | OUTPATIENT
Start: 2022-09-09

## 2022-09-09 RX ORDER — LOSARTAN POTASSIUM 50 MG/1
50 TABLET ORAL 2 TIMES DAILY
Qty: 180 TABLET | Refills: 1 | Status: SHIPPED | OUTPATIENT
Start: 2022-09-09

## 2022-09-09 ASSESSMENT — ENCOUNTER SYMPTOMS: SHORTNESS OF BREATH: 0

## 2022-09-09 ASSESSMENT — PATIENT HEALTH QUESTIONNAIRE - PHQ9
5. POOR APPETITE OR OVEREATING: 0
SUM OF ALL RESPONSES TO PHQ QUESTIONS 1-9: 0
1. LITTLE INTEREST OR PLEASURE IN DOING THINGS: 0
4. FEELING TIRED OR HAVING LITTLE ENERGY: 0
SUM OF ALL RESPONSES TO PHQ QUESTIONS 1-9: 0
SUM OF ALL RESPONSES TO PHQ QUESTIONS 1-9: 0
10. IF YOU CHECKED OFF ANY PROBLEMS, HOW DIFFICULT HAVE THESE PROBLEMS MADE IT FOR YOU TO DO YOUR WORK, TAKE CARE OF THINGS AT HOME, OR GET ALONG WITH OTHER PEOPLE: 0
2. FEELING DOWN, DEPRESSED OR HOPELESS: 0
9. THOUGHTS THAT YOU WOULD BE BETTER OFF DEAD, OR OF HURTING YOURSELF: 0
SUM OF ALL RESPONSES TO PHQ QUESTIONS 1-9: 0
SUM OF ALL RESPONSES TO PHQ9 QUESTIONS 1 & 2: 0
8. MOVING OR SPEAKING SO SLOWLY THAT OTHER PEOPLE COULD HAVE NOTICED. OR THE OPPOSITE, BEING SO FIGETY OR RESTLESS THAT YOU HAVE BEEN MOVING AROUND A LOT MORE THAN USUAL: 0
3. TROUBLE FALLING OR STAYING ASLEEP: 0
7. TROUBLE CONCENTRATING ON THINGS, SUCH AS READING THE NEWSPAPER OR WATCHING TELEVISION: 0
6. FEELING BAD ABOUT YOURSELF - OR THAT YOU ARE A FAILURE OR HAVE LET YOURSELF OR YOUR FAMILY DOWN: 0

## 2022-09-09 NOTE — PATIENT INSTRUCTIONS
*Continue losartan 50 mg twice daily. *Add amlodipine 2.5 mg once a day. Let us know if you experience any side effects such as diarrhea. *Please keep track of your blood pressure. Choose 3-4 days a week, check it in the morning and again later in the day/evening. Write down your results and bring them with you to your next office visit for review. We'd like you to stay < 140/90, and ideally < 130/80. Let us know if you find yourself above this routinely. *A referral has been placed to behavioral health. They should contact you in the next 7-10 days to schedule. Please let us know if you do not hear from them.

## 2022-09-09 NOTE — PROGRESS NOTES
back to work and she is now working with him. She had followed with a neuropsychiatrist in the past (Dr. Chaz Ta) but she is no longer practicing with [facility]. Patient is asking if we can make a referral to behavioral health for her to see a therapist/psychologist.    Past History:    Past Medical history   Past Medical History:   Diagnosis Date    Adverse effect of anesthesia     patient states she is slower to wake up than other and feels groggy longer    Anxiety     Atrial fibrillation ([address]     Depression     Diabetes ([address]     Hypercholesterolemia     Hypertension     IBS (irritable bowel syndrome)     Lumbar disc herniation     Pacemaker     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 ([address]     Sjogren's syndrome (HCC)     dry eye and fatigue are her symptoms    Sleep apnea     patient does not use a c-pap    SSS (sick sinus syndrome) ([address]     TBI (traumatic brain injury) ([address]     Thyroid disease     hypothyroidism       Current Problem List:   Patient Active Problem List   Diagnosis    Anxiety    Post-traumatic headache    DM (diabetes mellitus) ([address]    Age-related nuclear cataract of both eyes    Statin intolerance    Symptomatic bradycardia    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 (HCC)    Depression, major, single episode, moderate ([address]    Hyperlipidemia associated with type 2 diabetes mellitus ([address]    Dry eye syndrome, bilateral    Pacemaker    Sjogren's syndrome ([address]    Primary hypothyroidism    IBS (irritable bowel syndrome)    HTN (hypertension)    Acute midline low back pain without sciatica    Allergic blepharitis, left    Angular blepharitis of both eyes    Myopia of both eyes with regular astigmatism and presbyopia    Posterior vitreous detachment, both eyes    Controlled type 2 diabetes mellitus without complication, without long-term current use of insulin ([address]       Current Medications: .   Current Outpatient Medications   Medication Sig Dispense Refill    amLODIPine (NORVASC) 2.5 MG tablet Take 1 tablet by mouth daily 30 tablet 1    losartan (COZAAR) 50 MG tablet Take 1 tablet by mouth 2 times daily 180 tablet 1    ULTICARE MICRO PEN NEEDLES 32G X 4 MM MISC USE 1 NEEDLE DAILY WITH INSULIN      insulin glargine (LANTUS;BASAGLAR) 100 UNIT/ML injection pen Inject 28 Units into the skin in the morning. 5 pen 5    thyroid (ARMOUR) 30 MG tablet Take 1 tablet by mouth in the morning. 90 tablet 3    apixaban (ELIQUIS) 5 MG TABS tablet Take 1 tablet by mouth in the morning and 1 tablet before bedtime. 180 tablet 3    ZINC SULFATE ER PO Take by mouth      acetaminophen (TYLENOL) 325 MG tablet Take 650 mg by mouth every 4 hours as needed      ascorbic acid (VITAMIN C) 500 MG tablet Take by mouth      fluticasone (FLONASE) 50 MCG/ACT nasal spray 2 sprays by Nasal route daily      meclizine (ANTIVERT) 25 MG tablet Take 2 mg by mouth as needed       No current facility-administered medications for this visit. Allergies: Allergies   Allergen Reactions    Clindamycin Anaphylaxis     Other reaction(s): Unknown-Unspecified    Gold      Other reaction(s): Unknown-Unspecified    Hydrocortisone      Other reaction(s): Unknown-Unspecified    Nickel      Other reaction(s): Unknown-Unspecified    Vitamin E      Other reaction(s): Unknown-Unspecified    Amlodipine Diarrhea    Metformin Nausea Only    Amoxicillin Other (See Comments)     Tolerates up to 500 mg dose OK  Other reaction(s): Other- (not listed) - Allergy  Other reaction(s): Vertigo    Cortisone Other (See Comments)     Other reaction(s): Other- (not listed) - Allergy  Other reaction(s): Other (comments)    Linagliptin Other (See Comments)     floaters    Metformin And Related Diarrhea    Methylcellulose Other (See Comments)     Other reaction(s): Other- (not listed) - Allergy  Other reaction(s):  Other (comments)       Surgical History:  Past Surgical History:   Procedure Laterality Date    CHOLECYSTECTOMY, LAPAROSCOPIC  11/2007 COLONOSCOPY N/A 5/5/2017    COLONOSCOPY/ 23 performed by Michele Hernández MD at [facility] ENDOSCOPY    ORTHOPEDIC SURGERY      '18 laser treaatment lumber disc herniation    PACEMAKER  11/01/2021    RETINAL DETACHMENT SURGERY Right 09/2021    TUBAL LIGATION  1976       Family History:  Family History   Problem Relation Age of Onset    Cancer Father         [place]    Cancer Paternal Aunt         ovarian    Heart Disease Brother     Breast Cancer Neg Hx     Cancer Paternal Grandfather         [place]    Heart Disease Father     Stroke Father     Diabetes Brother         DMII       Social History:   Social History     Social History Narrative    Not on file      Social History     Socioeconomic History    Marital status:      Spouse name: Not on file    Number of children: Not on file    Years of education: Not on file    Highest education level: Not on file   Occupational History    Not on file   Tobacco Use    Smoking status: Never    Smokeless tobacco: Never   Vaping Use    Vaping Use: Never used   Substance and Sexual Activity    Alcohol use: No    Drug use: No    Sexual activity: Not Currently   Other Topics Concern    Not on file   Social History Narrative    Not on file     Social Determinants of Health     Financial Resource Strain: Low Risk     Difficulty of Paying Living Expenses: Not hard at all   Food Insecurity: No Food Insecurity    Worried About Running Out of Food in the Last Year: Never true    Ran Out of Food in the Last Year: Never true   Transportation Needs: Not on file   Physical Activity: Not on file   Stress: Not on file   Social Connections: Not on file   Intimate Partner Violence: Not on file   Housing Stability: Not on file         ROS  Review of Systems   Constitutional:  Negative for chills and fever. Respiratory:  Negative for shortness of breath. Cardiovascular:  Negative for chest pain and palpitations. Psychiatric/Behavioral:  Positive for dysphoric mood.  Negative for self-injury and suicidal ideas. The patient is nervous/anxious. BP (!) 160/84 (Site: Right Upper Arm, Position: Sitting, Cuff Size: Small Adult)   Pulse 93   Temp 97.2 °F (36.2 °C) (Temporal)   Resp 16   Ht 5' 1\" (1.549 m)   Wt 126 lb (57.2 kg)   SpO2 98%   BMI 23.81 kg/m²   Body mass index is 23.81 kg/m². Physical Exam    Physical Exam  Vitals and nursing note reviewed. Constitutional:       Appearance: Normal appearance. She is not ill-appearing. HENT:      Head: Normocephalic. Cardiovascular:      Rate and Rhythm: Normal rate and regular rhythm. Heart sounds: Normal heart sounds. No murmur heard. Pulmonary:      Effort: Pulmonary effort is normal.      Breath sounds: Normal breath sounds. Musculoskeletal:      Right lower leg: No edema. Left lower leg: No edema. Neurological:      Mental Status: She is alert. Psychiatric:         Mood and Affect: Mood normal.         Behavior: Behavior normal.         Thought Content: Thought content normal.       ASSESSMENT & PLAN    ICD-10-CM    1. Hypertension, uncontrolled  I10 amLODIPine (NORVASC) 2.5 MG tablet     losartan (COZAAR) 50 MG tablet      2. Depression, major, single episode, moderate ([facility]  F32.1 [address]      3. Type 2 diabetes mellitus with hyperglycemia, with long-term current use of insulin ([facility])  E11.65     Z79.4            1. Hypertension, uncontrolled  *Blood pressure remains uncontrolled. We will have her continue losartan 50 mg twice daily. *Patient would like to try resuming amlodipine. It is on her allergy list as causing diarrhea however she feels that the loose stools may have been more of an issue with her taking metformin. Seem to resolve when she discontinued the metformin. We will start her at a low dose of amlodipine (2.5 mg daily). She is to monitor blood pressure at home. Call if consistently greater than 140/90.   We will follow-up at her next scheduled visit. - amLODIPine (NORVASC) 2.5 MG tablet; Take 1 tablet by mouth daily  Dispense: 30 tablet; Refill: 1  - losartan (COZAAR) 50 MG tablet; Take 1 tablet by mouth 2 times daily  Dispense: 180 tablet; Refill: 1    2. Depression, major, single episode, moderate ([facility]  *We will arrange referral to behavioral health. - [address]    3. Type 2 diabetes mellitus with hyperglycemia, with long-term current use of insulin (HCC)  *Stable issue. Patient reports home blood sugar readings have been well controlled current dose of Lantus, 28 units each a.m. Orders Placed This Encounter   Procedures    [address]     Referral Priority:   Routine     Referral Type:   Eval and Treat     Referral Reason:   Specialty Services Required     Requested Specialty:   Behavioral Health     Number of Visits Requested:   1     I have reviewed the patient's past medical history, social history and family history and vitals. We have discussed treatment plan and follow up and given patient instructions. Patient's questions are answered and we will follow up as indicated. Dictated using voice recognition software. Proof read but unrecognized errors may exist.    Follow-up and Dispositions    Return As previously scheduled, 11/2022, with with Dr. Jaydon Feldman.          Xander Rodriguez PA-C

## 2022-09-25 ASSESSMENT — ENCOUNTER SYMPTOMS
CONSTIPATION: 0
PHOTOPHOBIA: 0
ABDOMINAL DISTENTION: 0
DIARRHEA: 0
SHORTNESS OF BREATH: 0
ABDOMINAL PAIN: 0
SORE THROAT: 0
COUGH: 0

## 2022-09-25 NOTE — PROGRESS NOTES
[facility] CARDIOLOGY  [address] [facility], [address]  [facility], [address] [place] [address]  PHONE: [phone]        NAME:  Merari Torres  : 1950  MRN: [id number]     PCP:  Faith Graham MD, MD      SUBJECTIVE:   Merari Torres is a 67 y.o. female seen for a follow up visit regarding the following:     Chief Complaint   Patient presents with    Hypertension    Irregular Heart Beat       HPI:    Recently admitted to [address] [place] [address] with high-grade AV block and syncope with fall. She had a dual-chamber MRI safe Medtronic pacemaker implantation with good result and she presents today for interrogation, with leads looking good and incision well-healed. She is doing well from a pacemaker standpoint and had a nuclear stress test recently showing normal perfusion with normal ejection fraction and regional wall motion. She denies any recent angina, syncope, CHF, or palpitations. Pacemaker site  well-healed, interrogations stable. Her blood pressure is elevated consistently at home and here today despite medication compliance (recently increased losartan to 50 mg twice daily but blood pressure remains elevated). Cannot tolerate statins, refuses. Trying to watch diet, walking for exercise without any limitations or exertional symptoms. Being evaluated by [facility] neurology and [facility] for bilateral vertebral artery stenosis, currently stable without any recent neurologic symptoms on Eliquis twice daily with good compliance. Past Medical History, Past Surgical History, Family history, Social History, and Medications were all reviewed with the patient today and updated as necessary.      Current Outpatient Medications   Medication Sig Dispense Refill    chlorthalidone (HYGROTON) 25 MG tablet Take 1 tablet by mouth daily 90 tablet 3    amLODIPine (NORVASC) 2.5 MG tablet Take 1 tablet by mouth daily 30 tablet 1    losartan (COZAAR) 50 MG tablet Take 1 tablet by mouth 2 times daily 180 tablet 1    ULTICARE MICRO PEN NEEDLES 32G X 4 MM MISC USE 1 NEEDLE DAILY WITH INSULIN      insulin glargine (LANTUS;BASAGLAR) 100 UNIT/ML injection pen Inject 28 Units into the skin in the morning. 5 pen 5    thyroid (ARMOUR) 30 MG tablet Take 1 tablet by mouth in the morning. 90 tablet 3    apixaban (ELIQUIS) 5 MG TABS tablet Take 1 tablet by mouth in the morning and 1 tablet before bedtime. 180 tablet 3    acetaminophen (TYLENOL) 325 MG tablet Take 650 mg by mouth every 4 hours as needed      ascorbic acid (VITAMIN C) 500 MG tablet Take by mouth daily      fluticasone (FLONASE) 50 MCG/ACT nasal spray 2 sprays by Nasal route as needed      meclizine (ANTIVERT) 25 MG tablet Take 2 mg by mouth as needed      ZINC SULFATE ER PO Take by mouth daily       No current facility-administered medications for this visit. Allergies   Allergen Reactions    Clindamycin Anaphylaxis     Other reaction(s): Unknown-Unspecified    Gold      Other reaction(s): Unknown-Unspecified    Hydrocortisone      Other reaction(s): Unknown-Unspecified    Nickel      Other reaction(s): Unknown-Unspecified    Vitamin E      Other reaction(s): Unknown-Unspecified    Amlodipine Diarrhea    Metformin Nausea Only    Amoxicillin Other (See Comments)     Tolerates up to 500 mg dose OK  Other reaction(s): Other- (not listed) - Allergy  Other reaction(s): Vertigo    Cortisone Other (See Comments)     Other reaction(s): Other- (not listed) - Allergy  Other reaction(s): Other (comments)    Linagliptin Other (See Comments)     floaters    Metformin And Related Diarrhea    Methylcellulose Other (See Comments)     Other reaction(s): Other- (not listed) - Allergy  Other reaction(s):  Other (comments)       Patient Active Problem List    Diagnosis Date Noted    Myopia of both eyes with regular astigmatism and presbyopia 06/18/2019     Priority: Medium    Posterior vitreous detachment, both eyes 06/18/2019     Priority: Medium    Allergic blepharitis, left 10/03/2018     Priority: Medium    Angular blepharitis of both eyes 10/03/2018     Priority: Medium    Controlled type 2 diabetes mellitus without complication, without long-term current use of insulin ([address] 06/14/2018     Priority: Medium    Post-traumatic headache 11/16/2021     Priority: Low    Pacemaker 11/16/2021     Priority: Low    Acute midline low back pain without sciatica 11/16/2021     Priority: Low    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 ([address]      Priority: Low    Symptomatic bradycardia 10/30/2021     Priority: Low    Statin intolerance 02/18/2021     Priority: Low    Hyperlipidemia associated with type 2 diabetes mellitus ([address] 02/18/2021     Priority: Low    Anxiety 03/11/2020     Priority: Low    Age-related nuclear cataract of both eyes 06/18/2019     Priority: Low    Dry eye syndrome, bilateral 06/18/2019     Priority: Low    Depression, major, single episode, moderate ([address] 05/31/2016     Priority: Low    DM (diabetes mellitus) ([address] 10/04/2012     Priority: Low    Sjogren's syndrome ([address] 10/04/2012     Priority: Low    Primary hypothyroidism 10/04/2012     Priority: Low    IBS (irritable bowel syndrome) 10/04/2012     Priority: Low    HTN (hypertension) 10/04/2012     Priority: Low        Past Surgical History:   Procedure Laterality Date    CHOLECYSTECTOMY, LAPAROSCOPIC  11/2007    COLONOSCOPY N/A 5/5/2017    COLONOSCOPY/ 23 performed by Ankur Min MD at [facility] ENDOSCOPY    ORTHOPEDIC SURGERY      '18 laser treaatment lumber disc herniation    PACEMAKER  11/01/2021    RETINAL DETACHMENT SURGERY Right 09/2021    TUBAL LIGATION  1976       Family History   Problem Relation Age of Onset    Cancer Father         [place]    Cancer Paternal Aunt         ovarian    Heart Disease Brother     Breast Cancer Neg Hx     Cancer Paternal Grandfather         [place]    Heart Disease Father     Stroke Father     Diabetes Brother         DMII        Social History     Tobacco Use    Smoking status: Never    Smokeless tobacco: Never   Substance Use Topics    Alcohol use: No       ROS:    Review of Systems   Constitutional:  Negative for appetite change, chills, diaphoresis and fatigue. HENT:  Negative for congestion, mouth sores, nosebleeds, sore throat and tinnitus. Eyes:  Negative for photophobia and visual disturbance. Respiratory:  Negative for cough and shortness of breath. Cardiovascular:  Negative for chest pain, palpitations and leg swelling. Gastrointestinal:  Negative for abdominal distention, abdominal pain, constipation and diarrhea. Endocrine: Negative for cold intolerance, heat intolerance, polydipsia and polyuria. Genitourinary:  Negative for dysuria and hematuria. Musculoskeletal:  Negative for arthralgias, joint swelling and myalgias. Skin:  Negative for rash. Allergic/Immunologic: Negative for environmental allergies and food allergies. Neurological:  Negative for dizziness, seizures, syncope and light-headedness. Hematological:  Negative for adenopathy. Does not bruise/bleed easily. Psychiatric/Behavioral:  Negative for agitation, behavioral problems, dysphoric mood and hallucinations. The patient is not nervous/anxious. PHYSICAL EXAM:     Vitals:    09/27/22 1440 09/27/22 1447   BP: (!) 180/82 (!) 172/90   Pulse: 84    Weight: 126 lb 9 oz (57.4 kg)    Height: 5' 1\" (1.549 m)       Wt Readings from Last 3 Encounters:   09/27/22 126 lb 9 oz (57.4 kg)   09/09/22 126 lb (57.2 kg)   08/22/22 126 lb 12.8 oz (57.5 kg)      BP Readings from Last 3 Encounters:   09/27/22 (!) 172/90   09/09/22 (!) 160/84   08/22/22 (!) 164/90        Physical Exam  Constitutional:       Appearance: Normal appearance. She is normal weight. HENT:      Head: Normocephalic and atraumatic. Nose: Nose normal.      Mouth/Throat:      Mouth: Mucous membranes are moist.      Pharynx: Oropharynx is clear. Eyes:      Extraocular Movements: Extraocular movements intact.       Pupils: Pupils are equal, round, and reactive to light. Neck:      Vascular: No carotid bruit or JVD. Cardiovascular:      Rate and Rhythm: Normal rate and regular rhythm. Heart sounds: No murmur heard. No friction rub. No gallop. Pulmonary:      Effort: Pulmonary effort is normal.      Breath sounds: Normal breath sounds. No wheezing or rhonchi. Abdominal:      General: Abdomen is flat. Bowel sounds are normal. There is no distension. Palpations: Abdomen is soft. Tenderness: no abdominal tenderness   Musculoskeletal:         General: No swelling. Normal range of motion. Cervical back: Normal range of motion and neck supple. No tenderness. Skin:     General: Skin is warm and dry. Neurological:      General: No focal deficit present. Mental Status: She is alert and oriented to person, place, and time. Mental status is at baseline. Psychiatric:         Mood and Affect: Mood normal.         Behavior: Behavior normal.        Medical problems and test results were reviewed with the patient today.        Lab Results   Component Value Date    CHOL 250 (H) 04/18/2022    CHOL 272 (H) 01/14/2022    CHOL 274 (H) 10/18/2021     Lab Results   Component Value Date    TRIG 41 04/18/2022    TRIG 164 (H) 01/14/2022    TRIG 207 (H) 10/18/2021     Lab Results   Component Value Date    HDL 94 04/18/2022    HDL 60 01/14/2022    HDL 54 10/18/2021     Lab Results   Component Value Date    LDLCALC 150 (H) 04/18/2022    LDLCALC 182 (H) 01/14/2022    LDLCALC 181 (H) 10/18/2021     Lab Results   Component Value Date    LABVLDL 40 03/05/2020    LABVLDL 33 11/19/2019    VLDL 6 04/18/2022    VLDL 30 01/14/2022    VLDL 39 10/18/2021     No results found for: [facility]     Lab Results   Component Value Date/Time     08/09/2022 10:57 AM    K 4.1 08/09/2022 10:57 AM     08/09/2022 10:57 AM    CO2 28 08/09/2022 10:57 AM    BUN 13 08/09/2022 10:57 AM    CREATININE 0.90 08/09/2022 10:57 AM    GLUCOSE 146 08/09/2022 10:57 AM    CALCIUM 9.4 08/09/2022 10:57 AM         No results for input(s): WBC, HGB, HCT, MCV, PLT in the last 720 hours. Lab Results   Component Value Date    LABA1C 7.6 (H) 08/09/2022     Lab Results   Component Value Date     08/09/2022        No results found for: BNP     Lab Results   Component Value Date    TSH 0.551 01/14/2022        No results found for any visits on 09/27/22. ASSESSMENT and PLAN    Diagnoses and all orders for this visit:      1. Primary hypertension-uncontrolled. See below. 2. Diabetes mellitus, with long-term current use of insulin ([address] stable, per primary care physician      3. Hyperlipidemia associated with type 2 diabetes mellitus (HCC)-elevated LDL, continue surveillance per PCP- intolerant to statins, ended up in the ER 3x on statin in the past.       4. Primary hypothyroidism-continue surveillance per PCP      5. Symptomatic bradycardia-stable, resolved after Medtronic dual-chamber pacer implant. Interrogation and site look good today. Stay well-hydrated but minimize sodium. 6.  Paroxysmal atrial fibrillation-monitor pacemaker, continue Eliquis    7. Vertebral artery stenosis-avoid hypotension, intolerant to all statins and refuses, continue Eliquis. Per [facility] neurology. ?  Repatha    Plan:  Continue losartan 50 mg twice daily  Increase amlodipine to 2.5 mg twice daily  Add chlorthalidone 25 mg daily  Check BMP and magnesium in 1 week  Daily blood pressure and heart rate log until follow-up, add beta-blockers as needed/tolerated and follow-up. Return in about 2 weeks (around 10/11/2022).          Dago Yoo MD  9/27/2022  3:05 PM

## 2022-09-26 ENCOUNTER — TELEPHONE (OUTPATIENT)
Dept: SLEEP MEDICINE | Age: 72
End: 2022-09-26

## 2022-09-26 NOTE — TELEPHONE ENCOUNTER
[employer]  Stephane Lujan) they need a certificate of necessity for the insurance to cover cpap     [phone]

## 2022-09-26 NOTE — TELEPHONE ENCOUNTER
Spoke with Jemal Jade at [facility] and she said disregard the message because they send in everything and if they call back to tell them to call the local office.

## 2022-09-27 ENCOUNTER — OFFICE VISIT (OUTPATIENT)
Dept: CARDIOLOGY CLINIC | Age: 72
End: 2022-09-27
Payer: MEDICARE

## 2022-09-27 VITALS
SYSTOLIC BLOOD PRESSURE: 172 MMHG | BODY MASS INDEX: 23.9 KG/M2 | WEIGHT: 126.56 LBS | HEART RATE: 84 BPM | HEIGHT: 61 IN | DIASTOLIC BLOOD PRESSURE: 90 MMHG

## 2022-09-27 DIAGNOSIS — E78.5 HYPERLIPIDEMIA ASSOCIATED WITH TYPE 2 DIABETES MELLITUS (HCC): ICD-10-CM

## 2022-09-27 DIAGNOSIS — Z95.0 PACEMAKER: ICD-10-CM

## 2022-09-27 DIAGNOSIS — E11.69 HYPERLIPIDEMIA ASSOCIATED WITH TYPE 2 DIABETES MELLITUS (HCC): ICD-10-CM

## 2022-09-27 DIAGNOSIS — E11.9 CONTROLLED TYPE 2 DIABETES MELLITUS WITHOUT COMPLICATION, WITHOUT LONG-TERM CURRENT USE OF INSULIN (HCC): ICD-10-CM

## 2022-09-27 DIAGNOSIS — R00.1 SYMPTOMATIC BRADYCARDIA: ICD-10-CM

## 2022-09-27 DIAGNOSIS — I10 PRIMARY HYPERTENSION: Primary | ICD-10-CM

## 2022-09-27 PROCEDURE — G8400 PT W/DXA NO RESULTS DOC: HCPCS | Performed by: INTERNAL MEDICINE

## 2022-09-27 PROCEDURE — 99214 OFFICE O/P EST MOD 30 MIN: CPT | Performed by: INTERNAL MEDICINE

## 2022-09-27 PROCEDURE — G8427 DOCREV CUR MEDS BY ELIG CLIN: HCPCS | Performed by: INTERNAL MEDICINE

## 2022-09-27 PROCEDURE — G8420 CALC BMI NORM PARAMETERS: HCPCS | Performed by: INTERNAL MEDICINE

## 2022-09-27 PROCEDURE — 3017F COLORECTAL CA SCREEN DOC REV: CPT | Performed by: INTERNAL MEDICINE

## 2022-09-27 PROCEDURE — 2022F DILAT RTA XM EVC RTNOPTHY: CPT | Performed by: INTERNAL MEDICINE

## 2022-09-27 PROCEDURE — 1090F PRES/ABSN URINE INCON ASSESS: CPT | Performed by: INTERNAL MEDICINE

## 2022-09-27 PROCEDURE — 1123F ACP DISCUSS/DSCN MKR DOCD: CPT | Performed by: INTERNAL MEDICINE

## 2022-09-27 PROCEDURE — 3051F HG A1C>EQUAL 7.0%<8.0%: CPT | Performed by: INTERNAL MEDICINE

## 2022-09-27 PROCEDURE — 1036F TOBACCO NON-USER: CPT | Performed by: INTERNAL MEDICINE

## 2022-09-27 RX ORDER — CHLORTHALIDONE 25 MG/1
25 TABLET ORAL DAILY
Qty: 90 TABLET | Refills: 3 | Status: SHIPPED | OUTPATIENT
Start: 2022-09-27

## 2022-10-16 DIAGNOSIS — Z95.0 PACEMAKER: Primary | ICD-10-CM

## 2022-10-20 DIAGNOSIS — Z95.0 PACEMAKER: ICD-10-CM

## 2022-10-20 DIAGNOSIS — R00.1 SYMPTOMATIC BRADYCARDIA: Primary | ICD-10-CM

## 2022-10-31 ASSESSMENT — ENCOUNTER SYMPTOMS
SORE THROAT: 0
CONSTIPATION: 0
SHORTNESS OF BREATH: 0
PHOTOPHOBIA: 0
ABDOMINAL DISTENTION: 0
ABDOMINAL PAIN: 0
DIARRHEA: 0
COUGH: 0

## 2022-11-01 ENCOUNTER — OFFICE VISIT (OUTPATIENT)
Dept: CARDIOLOGY CLINIC | Age: 72
End: 2022-11-01
Payer: MEDICARE

## 2022-11-01 VITALS
HEART RATE: 77 BPM | HEIGHT: 61 IN | BODY MASS INDEX: 24.55 KG/M2 | DIASTOLIC BLOOD PRESSURE: 84 MMHG | WEIGHT: 130 LBS | SYSTOLIC BLOOD PRESSURE: 172 MMHG

## 2022-11-01 DIAGNOSIS — I10 PRIMARY HYPERTENSION: ICD-10-CM

## 2022-11-01 DIAGNOSIS — E11.69 HYPERLIPIDEMIA ASSOCIATED WITH TYPE 2 DIABETES MELLITUS (HCC): ICD-10-CM

## 2022-11-01 DIAGNOSIS — E78.5 HYPERLIPIDEMIA ASSOCIATED WITH TYPE 2 DIABETES MELLITUS (HCC): ICD-10-CM

## 2022-11-01 DIAGNOSIS — E11.9 CONTROLLED TYPE 2 DIABETES MELLITUS WITHOUT COMPLICATION, WITHOUT LONG-TERM CURRENT USE OF INSULIN (HCC): ICD-10-CM

## 2022-11-01 DIAGNOSIS — Z95.0 PACEMAKER: ICD-10-CM

## 2022-11-01 DIAGNOSIS — R00.1 SYMPTOMATIC BRADYCARDIA: Primary | ICD-10-CM

## 2022-11-01 PROCEDURE — G8484 FLU IMMUNIZE NO ADMIN: HCPCS | Performed by: INTERNAL MEDICINE

## 2022-11-01 PROCEDURE — 3051F HG A1C>EQUAL 7.0%<8.0%: CPT | Performed by: INTERNAL MEDICINE

## 2022-11-01 PROCEDURE — 1090F PRES/ABSN URINE INCON ASSESS: CPT | Performed by: INTERNAL MEDICINE

## 2022-11-01 PROCEDURE — 93000 ELECTROCARDIOGRAM COMPLETE: CPT | Performed by: INTERNAL MEDICINE

## 2022-11-01 PROCEDURE — 1036F TOBACCO NON-USER: CPT | Performed by: INTERNAL MEDICINE

## 2022-11-01 PROCEDURE — 2022F DILAT RTA XM EVC RTNOPTHY: CPT | Performed by: INTERNAL MEDICINE

## 2022-11-01 PROCEDURE — 3074F SYST BP LT 130 MM HG: CPT | Performed by: INTERNAL MEDICINE

## 2022-11-01 PROCEDURE — 1123F ACP DISCUSS/DSCN MKR DOCD: CPT | Performed by: INTERNAL MEDICINE

## 2022-11-01 PROCEDURE — 99214 OFFICE O/P EST MOD 30 MIN: CPT | Performed by: INTERNAL MEDICINE

## 2022-11-01 PROCEDURE — G8400 PT W/DXA NO RESULTS DOC: HCPCS | Performed by: INTERNAL MEDICINE

## 2022-11-01 PROCEDURE — 3017F COLORECTAL CA SCREEN DOC REV: CPT | Performed by: INTERNAL MEDICINE

## 2022-11-01 PROCEDURE — G8427 DOCREV CUR MEDS BY ELIG CLIN: HCPCS | Performed by: INTERNAL MEDICINE

## 2022-11-01 PROCEDURE — 3078F DIAST BP <80 MM HG: CPT | Performed by: INTERNAL MEDICINE

## 2022-11-01 PROCEDURE — G8420 CALC BMI NORM PARAMETERS: HCPCS | Performed by: INTERNAL MEDICINE

## 2022-11-01 RX ORDER — EZETIMIBE 10 MG/1
10 TABLET ORAL DAILY
COMMUNITY

## 2022-11-01 NOTE — PROGRESS NOTES
[facility] CARDIOLOGY  [address], [address]  PHONE: [phone]        NAME:  Lopez Lerma  : 1950  MRN: [id number]     PCP:  Eladio Carrasquillo MD, MD      SUBJECTIVE:   Lopez Lerma is a 67 y.o. female seen for a follow up visit regarding the following:     Chief Complaint   Patient presents with    Hypertension    Bradycardia       HPI:    Recently admitted to [address] [place] [address] with high-grade AV block and syncope with fall. She had a dual-chamber MRI safe Medtronic pacemaker implantation with good result. .. She is doing well from a pacemaker standpoint and had a nuclear stress test recently showing normal perfusion with normal ejection fraction and regional wall motion. She denies any recent angina, syncope, CHF, or palpitations. Pacemaker site  well-healed, interrogations stable. Her blood pressure is elevated consistently at home and here today despite medication compliance (recently increased losartan to 50 mg twice daily but blood pressure remains elevated). Increased amlodipine to 2.5 mg twice daily with no increase in lower extremity edema and good blood pressure control, at home consistently with systolics 1 33-4 40. She has whitecoat hypertension here and in all MD offices. Cannot tolerate statins, refuses, has tried numerous statins in the past, all with side effects. Trying to watch diet, walking for exercise without any limitations or exertional symptoms.  earlier this year. Familial most likely. Willing to try Malachy Hansen, prescribing today. Being evaluated by [facility] neurology and [facility] for bilateral vertebral artery stenosis, currently stable without any recent neurologic symptoms on Eliquis twice daily with good compliance. Past Medical History, Past Surgical History, Family history, Social History, and Medications were all reviewed with the patient today and updated as necessary.      Current Comments)     floaters    Metformin And Related Diarrhea    Methylcellulose Other (See Comments)     Other reaction(s): Other- (not listed) - Allergy  Other reaction(s):  Other (comments)       Patient Active Problem List    Diagnosis Date Noted    Myopia of both eyes with regular astigmatism and presbyopia 06/18/2019     Priority: Medium    Posterior vitreous detachment, both eyes 06/18/2019     Priority: Medium    Allergic blepharitis, left 10/03/2018     Priority: Medium    Angular blepharitis of both eyes 10/03/2018     Priority: Medium    Controlled type 2 diabetes mellitus without complication, without long-term current use of insulin ([facility] 06/14/2018     Priority: Medium    Post-traumatic headache 11/16/2021     Priority: Low    Pacemaker 11/16/2021     Priority: Low    Acute midline low back pain without sciatica 11/16/2021     Priority: Low    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 ([address]      Priority: Low    Symptomatic bradycardia 10/30/2021     Priority: Low    Statin intolerance 02/18/2021     Priority: Low    Hyperlipidemia associated with type 2 diabetes mellitus ([address] 02/18/2021     Priority: Low    Anxiety 03/11/2020     Priority: Low    Age-related nuclear cataract of both eyes 06/18/2019     Priority: Low    Dry eye syndrome, bilateral 06/18/2019     Priority: Low    Depression, major, single episode, moderate ([address] 05/31/2016     Priority: Low    DM (diabetes mellitus) ([address] 10/04/2012     Priority: Low    Sjogren's syndrome ([address] 10/04/2012     Priority: Low    Primary hypothyroidism 10/04/2012     Priority: Low    IBS (irritable bowel syndrome) 10/04/2012     Priority: Low    HTN (hypertension) 10/04/2012     Priority: Low        Past Surgical History:   Procedure Laterality Date    CHOLECYSTECTOMY, LAPAROSCOPIC  11/2007    COLONOSCOPY N/A 5/5/2017    COLONOSCOPY/ 23 performed by Nitin Hua MD at [facility] ENDOSCOPY    ORTHOPEDIC SURGERY      '18 laser treaatment lumber disc herniation PACEMAKER  11/01/2021    RETINAL DETACHMENT SURGERY Right 09/2021    TUBAL LIGATION  1976       Family History   Problem Relation Age of Onset    Cancer Father         [place]    Cancer Paternal Aunt         ovarian    Heart Disease Brother     Breast Cancer Neg Hx     Cancer Paternal Grandfather         [place]    Heart Disease Father     Stroke Father     Diabetes Brother         DMII        Social History     Tobacco Use    Smoking status: Never    Smokeless tobacco: Never   Substance Use Topics    Alcohol use: No       ROS:    Review of Systems   Constitutional:  Negative for appetite change, chills, diaphoresis and fatigue. HENT:  Negative for congestion, mouth sores, nosebleeds, sore throat and tinnitus. Eyes:  Negative for photophobia and visual disturbance. Respiratory:  Negative for cough and shortness of breath. Cardiovascular:  Negative for chest pain, palpitations and leg swelling. Gastrointestinal:  Negative for abdominal distention, abdominal pain, constipation and diarrhea. Endocrine: Negative for cold intolerance, heat intolerance, polydipsia and polyuria. Genitourinary:  Negative for dysuria and hematuria. Musculoskeletal:  Positive for arthralgias (frozen shoulder on left). Negative for joint swelling and myalgias. Skin:  Negative for rash. Allergic/Immunologic: Negative for environmental allergies and food allergies. Neurological:  Negative for dizziness, seizures, syncope and light-headedness. Hematological:  Negative for adenopathy. Does not bruise/bleed easily. Psychiatric/Behavioral:  Negative for agitation, behavioral problems, dysphoric mood and hallucinations. The patient is not nervous/anxious.        PHYSICAL EXAM:     Vitals:    11/01/22 1508 11/01/22 1523   BP: (!) 168/84 (!) 172/84   Pulse: 77    Weight: 130 lb (59 kg)    Height: 5' 1\" (1.549 m)       Wt Readings from Last 3 Encounters:   11/01/22 130 lb (59 kg)   09/27/22 126 lb 9 oz (57.4 kg)   09/09/22 126 lb (57.2 kg)      BP Readings from Last 3 Encounters:   11/01/22 (!) 172/84   09/27/22 (!) 172/90   09/09/22 (!) 160/84        Physical Exam  Constitutional:       Appearance: Normal appearance. She is normal weight. HENT:      Head: Normocephalic and atraumatic. Nose: Nose normal.      Mouth/Throat:      Mouth: Mucous membranes are moist.      Pharynx: Oropharynx is clear. Eyes:      Extraocular Movements: Extraocular movements intact. Pupils: Pupils are equal, round, and reactive to light. Neck:      Vascular: No carotid bruit or JVD. Cardiovascular:      Rate and Rhythm: Normal rate and regular rhythm. Heart sounds: No murmur heard. No friction rub. No gallop. Comments: Pacer left chest well healed  Pulmonary:      Effort: Pulmonary effort is normal.      Breath sounds: Normal breath sounds. No wheezing or rhonchi. Abdominal:      General: Abdomen is flat. Bowel sounds are normal. There is no distension. Palpations: Abdomen is soft. Tenderness: There is no abdominal tenderness. Musculoskeletal:         General: No swelling. Normal range of motion. Cervical back: Normal range of motion and neck supple. No tenderness. Skin:     General: Skin is warm and dry. Neurological:      General: No focal deficit present. Mental Status: She is alert and oriented to person, place, and time. Mental status is at baseline. Psychiatric:         Mood and Affect: Mood normal.         Behavior: Behavior normal.        Medical problems and test results were reviewed with the patient today.        Lab Results   Component Value Date    CHOL 250 (H) 04/18/2022    CHOL 272 (H) 01/14/2022    CHOL 274 (H) 10/18/2021     Lab Results   Component Value Date    TRIG 41 04/18/2022    TRIG 164 (H) 01/14/2022    TRIG 207 (H) 10/18/2021     Lab Results   Component Value Date    HDL 94 04/18/2022    HDL 60 01/14/2022    HDL 54 10/18/2021     Lab Results   Component Value Date    LDLCALC 150 (H) 04/18/2022    LDLCALC 182 (H) 01/14/2022    LDLCALC 181 (H) 10/18/2021     Lab Results   Component Value Date    LABVLDL 40 03/05/2020    LABVLDL 33 11/19/2019    VLDL 6 04/18/2022    VLDL 30 01/14/2022    VLDL 39 10/18/2021     No results found for: [facility]     Lab Results   Component Value Date/Time     08/09/2022 10:57 AM    K 4.1 08/09/2022 10:57 AM     08/09/2022 10:57 AM    CO2 28 08/09/2022 10:57 AM    BUN 13 08/09/2022 10:57 AM    CREATININE 0.90 08/09/2022 10:57 AM    GLUCOSE 146 08/09/2022 10:57 AM    CALCIUM 9.4 08/09/2022 10:57 AM         No results for input(s): WBC, HGB, HCT, MCV, PLT in the last 720 hours. Lab Results   Component Value Date    LABA1C 7.6 (H) 08/09/2022     Lab Results   Component Value Date     08/09/2022        No results found for: BNP     Lab Results   Component Value Date    TSH 0.551 01/14/2022        Results for orders placed or performed in visit on 11/01/22   EKG 12 lead    Impression    Sinus  Rhythm 77bpm  Low voltage in precordial leads. Poor R wave progression  NSSTTW changes        ASSESSMENT and PLAN    Diagnoses and all orders for this visit:      1. Primary hypertension-whitecoat hypertension here. Much better controlled at home, BP log reviewed and systolics consistently 783D to 140s. See below. 2. Diabetes mellitus, with long-term current use of insulin ([address] stable, per primary care physician      3. Hyperlipidemia associated with type 2 diabetes mellitus (HCC)-elevated LDL, continue surveillance per PCP- intolerant to statins, ended up in the ER 3x on statin in the past.  We will try Repatha 140 mg every 2 weeks. Prescribing today. Check lipids in 3 months. 4. Primary hypothyroidism-continue surveillance per PCP      5. Symptomatic bradycardia-stable, resolved after Medtronic dual-chamber pacer implant. Interrogation and site look good today. Stay well-hydrated but minimize sodium.     6.  Paroxysmal atrial fibrillation-monitor pacemaker, continue Eliquis    7. Vertebral artery stenosis-avoid hypotension, intolerant to all statins and refuses, continue Eliquis. Per [facility] neurology. She wishes to try Repatha    Plan:  Continue losartan 50 mg twice daily  Increase amlodipine to 2.5 mg twice daily  Stop chlorthalidone due to side effects of dizziness/nausea  Prescribe Repatha today, lipids in 3 months            Return in about 6 months (around 5/1/2023).          Bolivar Malone MD  11/1/2022  3:34 PM

## 2022-11-02 ENCOUNTER — TELEPHONE (OUTPATIENT)
Dept: CARDIOLOGY CLINIC | Age: 72
End: 2022-11-02

## 2022-11-07 DIAGNOSIS — I10 HYPERTENSION, UNCONTROLLED: ICD-10-CM

## 2022-11-07 RX ORDER — AMLODIPINE BESYLATE 2.5 MG/1
TABLET ORAL
Qty: 30 TABLET | Refills: 1 | Status: SHIPPED | OUTPATIENT
Start: 2022-11-07

## 2022-11-10 ENCOUNTER — NURSE ONLY (OUTPATIENT)
Dept: FAMILY MEDICINE CLINIC | Facility: CLINIC | Age: 72
End: 2022-11-10

## 2022-11-10 DIAGNOSIS — I10 HYPERTENSION, UNCONTROLLED: Primary | ICD-10-CM

## 2022-11-10 DIAGNOSIS — E11.65 TYPE 2 DIABETES MELLITUS WITH HYPERGLYCEMIA, WITH LONG-TERM CURRENT USE OF INSULIN (HCC): ICD-10-CM

## 2022-11-10 DIAGNOSIS — E78.2 MIXED HYPERLIPIDEMIA: ICD-10-CM

## 2022-11-10 DIAGNOSIS — Z79.4 TYPE 2 DIABETES MELLITUS WITH HYPERGLYCEMIA, WITH LONG-TERM CURRENT USE OF INSULIN (HCC): ICD-10-CM

## 2022-11-10 DIAGNOSIS — E03.9 HYPOTHYROIDISM, UNSPECIFIED TYPE: ICD-10-CM

## 2022-11-10 LAB
ALBUMIN SERPL-MCNC: 3.7 G/DL (ref 3.2–4.6)
ALBUMIN/GLOB SERPL: 1.2 {RATIO} (ref 0.4–1.6)
ALP SERPL-CCNC: 51 U/L (ref 50–136)
ALT SERPL-CCNC: 28 U/L (ref 12–65)
ANION GAP SERPL CALC-SCNC: 7 MMOL/L (ref 2–11)
AST SERPL-CCNC: 16 U/L (ref 15–37)
BASOPHILS # BLD: 0.1 K/UL (ref 0–0.2)
BASOPHILS NFR BLD: 1 % (ref 0–2)
BILIRUB SERPL-MCNC: 0.4 MG/DL (ref 0.2–1.1)
BUN SERPL-MCNC: 14 MG/DL (ref 8–23)
CALCIUM SERPL-MCNC: 9.8 MG/DL (ref 8.3–10.4)
CHLORIDE SERPL-SCNC: 105 MMOL/L (ref 101–110)
CHOLEST SERPL-MCNC: 261 MG/DL
CO2 SERPL-SCNC: 28 MMOL/L (ref 21–32)
CREAT SERPL-MCNC: 0.9 MG/DL (ref 0.6–1)
DIFFERENTIAL METHOD BLD: NORMAL
EOSINOPHIL # BLD: 0.2 K/UL (ref 0–0.8)
EOSINOPHIL NFR BLD: 3 % (ref 0.5–7.8)
ERYTHROCYTE [DISTWIDTH] IN BLOOD BY AUTOMATED COUNT: 12.9 % (ref 11.9–14.6)
GLOBULIN SER CALC-MCNC: 3 G/DL (ref 2.8–4.5)
GLUCOSE SERPL-MCNC: 150 MG/DL (ref 65–100)
HCT VFR BLD AUTO: 43 % (ref 35.8–46.3)
HDLC SERPL-MCNC: 73 MG/DL (ref 40–60)
HDLC SERPL: 3.6 {RATIO}
HGB BLD-MCNC: 13.6 G/DL (ref 11.7–15.4)
IMM GRANULOCYTES # BLD AUTO: 0 K/UL (ref 0–0.5)
IMM GRANULOCYTES NFR BLD AUTO: 0 % (ref 0–5)
LDLC SERPL CALC-MCNC: 166.8 MG/DL
LYMPHOCYTES # BLD: 2.1 K/UL (ref 0.5–4.6)
LYMPHOCYTES NFR BLD: 28 % (ref 13–44)
MCH RBC QN AUTO: 28.3 PG (ref 26.1–32.9)
MCHC RBC AUTO-ENTMCNC: 31.6 G/DL (ref 31.4–35)
MCV RBC AUTO: 89.6 FL (ref 82–102)
MONOCYTES # BLD: 0.4 K/UL (ref 0.1–1.3)
MONOCYTES NFR BLD: 6 % (ref 4–12)
NEUTS SEG # BLD: 4.6 K/UL (ref 1.7–8.2)
NEUTS SEG NFR BLD: 62 % (ref 43–78)
NRBC # BLD: 0 K/UL (ref 0–0.2)
PLATELET # BLD AUTO: 304 K/UL (ref 150–450)
PMV BLD AUTO: 10.3 FL (ref 9.4–12.3)
POTASSIUM SERPL-SCNC: 4.2 MMOL/L (ref 3.5–5.1)
PROT SERPL-MCNC: 6.7 G/DL (ref 6.3–8.2)
RBC # BLD AUTO: 4.8 M/UL (ref 4.05–5.2)
SODIUM SERPL-SCNC: 140 MMOL/L (ref 133–143)
TRIGL SERPL-MCNC: 106 MG/DL (ref 35–150)
TSH, 3RD GENERATION: 1.06 UIU/ML (ref 0.36–3.74)
VLDLC SERPL CALC-MCNC: 21.2 MG/DL (ref 6–23)
WBC # BLD AUTO: 7.5 K/UL (ref 4.3–11.1)

## 2022-11-11 LAB
EST. AVERAGE GLUCOSE BLD GHB EST-MCNC: 192 MG/DL
HBA1C MFR BLD: 8.3 % (ref 4.8–5.6)

## 2022-11-16 ENCOUNTER — OFFICE VISIT (OUTPATIENT)
Dept: FAMILY MEDICINE CLINIC | Facility: CLINIC | Age: 72
End: 2022-11-16
Payer: MEDICARE

## 2022-11-16 VITALS
HEART RATE: 80 BPM | RESPIRATION RATE: 16 BRPM | SYSTOLIC BLOOD PRESSURE: 182 MMHG | HEIGHT: 61 IN | WEIGHT: 130.8 LBS | TEMPERATURE: 98.3 F | BODY MASS INDEX: 24.7 KG/M2 | OXYGEN SATURATION: 98 % | DIASTOLIC BLOOD PRESSURE: 92 MMHG

## 2022-11-16 DIAGNOSIS — E78.2 MIXED HYPERLIPIDEMIA: ICD-10-CM

## 2022-11-16 DIAGNOSIS — E03.9 HYPOTHYROIDISM, UNSPECIFIED TYPE: ICD-10-CM

## 2022-11-16 DIAGNOSIS — Z79.4 TYPE 2 DIABETES MELLITUS WITH HYPERGLYCEMIA, WITH LONG-TERM CURRENT USE OF INSULIN (HCC): ICD-10-CM

## 2022-11-16 DIAGNOSIS — E11.65 TYPE 2 DIABETES MELLITUS WITH HYPERGLYCEMIA, WITH LONG-TERM CURRENT USE OF INSULIN (HCC): ICD-10-CM

## 2022-11-16 DIAGNOSIS — I10 PRIMARY HYPERTENSION: Primary | ICD-10-CM

## 2022-11-16 PROCEDURE — G8420 CALC BMI NORM PARAMETERS: HCPCS | Performed by: FAMILY MEDICINE

## 2022-11-16 PROCEDURE — 1090F PRES/ABSN URINE INCON ASSESS: CPT | Performed by: FAMILY MEDICINE

## 2022-11-16 PROCEDURE — 1036F TOBACCO NON-USER: CPT | Performed by: FAMILY MEDICINE

## 2022-11-16 PROCEDURE — 3052F HG A1C>EQUAL 8.0%<EQUAL 9.0%: CPT | Performed by: FAMILY MEDICINE

## 2022-11-16 PROCEDURE — G8484 FLU IMMUNIZE NO ADMIN: HCPCS | Performed by: FAMILY MEDICINE

## 2022-11-16 PROCEDURE — 1123F ACP DISCUSS/DSCN MKR DOCD: CPT | Performed by: FAMILY MEDICINE

## 2022-11-16 PROCEDURE — G8400 PT W/DXA NO RESULTS DOC: HCPCS | Performed by: FAMILY MEDICINE

## 2022-11-16 PROCEDURE — 3017F COLORECTAL CA SCREEN DOC REV: CPT | Performed by: FAMILY MEDICINE

## 2022-11-16 PROCEDURE — 3074F SYST BP LT 130 MM HG: CPT | Performed by: FAMILY MEDICINE

## 2022-11-16 PROCEDURE — 3078F DIAST BP <80 MM HG: CPT | Performed by: FAMILY MEDICINE

## 2022-11-16 PROCEDURE — 99214 OFFICE O/P EST MOD 30 MIN: CPT | Performed by: FAMILY MEDICINE

## 2022-11-16 PROCEDURE — 2022F DILAT RTA XM EVC RTNOPTHY: CPT | Performed by: FAMILY MEDICINE

## 2022-11-16 PROCEDURE — G8427 DOCREV CUR MEDS BY ELIG CLIN: HCPCS | Performed by: FAMILY MEDICINE

## 2022-11-16 RX ORDER — MONTELUKAST SODIUM 4 MG/1
1 TABLET, CHEWABLE ORAL 2 TIMES DAILY
Qty: 60 TABLET | Refills: 3 | Status: SHIPPED | OUTPATIENT
Start: 2022-11-16

## 2022-11-16 ASSESSMENT — PATIENT HEALTH QUESTIONNAIRE - PHQ9
2. FEELING DOWN, DEPRESSED OR HOPELESS: 0
SUM OF ALL RESPONSES TO PHQ QUESTIONS 1-9: 0
SUM OF ALL RESPONSES TO PHQ QUESTIONS 1-9: 0
8. MOVING OR SPEAKING SO SLOWLY THAT OTHER PEOPLE COULD HAVE NOTICED. OR THE OPPOSITE, BEING SO FIGETY OR RESTLESS THAT YOU HAVE BEEN MOVING AROUND A LOT MORE THAN USUAL: 0
5. POOR APPETITE OR OVEREATING: 0
6. FEELING BAD ABOUT YOURSELF - OR THAT YOU ARE A FAILURE OR HAVE LET YOURSELF OR YOUR FAMILY DOWN: 0
SUM OF ALL RESPONSES TO PHQ QUESTIONS 1-9: 0
1. LITTLE INTEREST OR PLEASURE IN DOING THINGS: 0
3. TROUBLE FALLING OR STAYING ASLEEP: 0
9. THOUGHTS THAT YOU WOULD BE BETTER OFF DEAD, OR OF HURTING YOURSELF: 0
SUM OF ALL RESPONSES TO PHQ9 QUESTIONS 1 & 2: 0
4. FEELING TIRED OR HAVING LITTLE ENERGY: 0
SUM OF ALL RESPONSES TO PHQ9 QUESTIONS 1 & 2: 0
10. IF YOU CHECKED OFF ANY PROBLEMS, HOW DIFFICULT HAVE THESE PROBLEMS MADE IT FOR YOU TO DO YOUR WORK, TAKE CARE OF THINGS AT HOME, OR GET ALONG WITH OTHER PEOPLE: 0
SUM OF ALL RESPONSES TO PHQ QUESTIONS 1-9: 0
SUM OF ALL RESPONSES TO PHQ QUESTIONS 1-9: 0
7. TROUBLE CONCENTRATING ON THINGS, SUCH AS READING THE NEWSPAPER OR WATCHING TELEVISION: 0
1. LITTLE INTEREST OR PLEASURE IN DOING THINGS: 0
SUM OF ALL RESPONSES TO PHQ QUESTIONS 1-9: 0
SUM OF ALL RESPONSES TO PHQ QUESTIONS 1-9: 0
2. FEELING DOWN, DEPRESSED OR HOPELESS: 0
SUM OF ALL RESPONSES TO PHQ QUESTIONS 1-9: 0

## 2022-11-16 ASSESSMENT — ENCOUNTER SYMPTOMS
RESPIRATORY NEGATIVE: 1
GASTROINTESTINAL NEGATIVE: 1

## 2022-11-16 NOTE — PROGRESS NOTES
Talia Lucero (:  1950) is a 67 y.o. female,Established patient, here for evaluation of the following chief complaint(s):  3 Month Follow-Up (Htn, Discuss labs, wanting to add cholesterol medication Colstipol. Cardiology prescribed medication but it was $800)         ASSESSMENT/PLAN:  1. Primary hypertension  2. Mixed hyperlipidemia  -     colestipol (COLESTID) 1 g tablet; Take 1 tablet by mouth 2 times daily, Disp-60 tablet, R-3Normal  -     Lipid Panel; Future  3. Type 2 diabetes mellitus with hyperglycemia, with long-term current use of insulin (HCC)  -     Hemoglobin A1C; Future  -     Comprehensive Metabolic Panel; Future  4. Hypothyroidism, unspecified type      PLAN:  1)  Start colestipol for cholesterol. 2)  Increase insulin to 30 units daily. If blood sugar before dinner is still above 150 then increase 2 units every 3 days. Maximum of 40 units at this time before talking to us. 3)  Recheck in 3 months. Return in about 3 months (around 2023) for EOV, Labs one week before. Subjective   SUBJECTIVE/OBJECTIVE:     66 yo female wth underlying a-fib, GABRIEL, uncontrolled HTN,        1) hypertension/atrial fibrillation: Has had a pacemaker implanted. Blood pressure at home: 672-649 systolic;  diastolic  Today in office elevated to 180+ initially; repeat was 150/80. 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 She is takign losartan to 50 mg BID. Takign amlodipine 2.5 mg most days. Declines additional medication at this time. 2)  IDDM:  Hemoglobin A1C   Date Value Ref Range Status   11/10/2022 8.3 (H) 4.8 - 5.6 % Final       Lantus: 28 units daily. (Takes after breakfast)  At home her BGLs are  100-150  Does not check before dinner. Willing to start  Cannot tolerate metformin.            3) hypothyroidism  Lab Results   Component Value Date    TSH 0.551 2022    CSP1IVA 1.060 11/10/2022     On same dose of [place] thyroid (30 mg) for some time. Could not afford zetia. Could tolerate statins due to muscle aches and dizziness. Her cholesterol is elevated     She desires to try colestipol. CV RISK score 40%. Review of Systems   Constitutional:  Negative for activity change, appetite change, chills, diaphoresis, fatigue and fever. Respiratory: Negative. Cardiovascular: Negative. Gastrointestinal: Negative. Musculoskeletal: Negative. Skin: Negative. Neurological:  Negative for headaches. Hematological: Negative. Psychiatric/Behavioral: Negative. Objective   Physical Exam  Vitals and nursing note reviewed. Constitutional:       General: She is not in acute distress. Appearance: She is not ill-appearing or toxic-appearing. Cardiovascular:      Rate and Rhythm: Normal rate and regular rhythm. Pulmonary:      Effort: Pulmonary effort is normal.   Skin:     General: Skin is warm. Neurological:      General: No focal deficit present. Mental Status: She is alert and oriented to person, place, and time. Psychiatric:         Mood and Affect: Mood normal.         Behavior: Behavior normal.         Thought Content: Thought content normal.         Judgment: Judgment normal.        Vitals:    11/16/22 1403   BP: (!) 182/92   Pulse: 80   Resp: 16   Temp: 98.3 °F (36.8 °C)   SpO2: 98%     On this date 11/16/2022 I have spent 30 minutes reviewing previous notes, test results and face to face with the patient discussing the diagnosis and importance of compliance with the treatment plan as well as documenting on the day of the visit. An electronic signature was used to authenticate this note.     --Leila Mobley MD, MD

## 2022-11-16 NOTE — PATIENT INSTRUCTIONS
PLAN:  1)  Start colestipol for cholesterol. 2)  Increase insulin to 30 units daily. If blood sugar before dinner is still above 150 then increase 2 units every 3 days. Maximum of 40 units at this time before talking to us. 3)  Recheck in 3 months.

## 2022-12-23 NOTE — PROGRESS NOTES
[address] [place] [address] Court, [address] [facility]  [phone]    Patient Name:  Alexis Dean  YOB: 1950      Office Visit 12/27/2022    CHIEF COMPLAINT:    Chief Complaint   Patient presents with    Follow-up           HISTORY OF PRESENT ILLNESS:    Patient is a 70-year-old female who presents for follow-up of GABRIEL. Diagnostic sleep study on 07/28/2022 with an AHI of 12.2 and lowest oxygen saturation of 84%. She was prescribed CPAP therapy and did not get CPAP machine but states that she never has used it. States that she has put his own and try to get comfortable with that but it immediately makes her claustrophobic. She reports she will not be able to tolerate this therapy. States that she does have a psychiatry evaluation coming up that she requested because she felt like her previous history of depression may have been returning. Reports that she was going to talk to psychiatry about the mask and claustrophobia. I did review her sleep study with her again and her sleep apnea is mild so referral to dentistry for dental appliance could be an option. She was very enthused by this and reports that she used to wear a mouthguard for teeth grinding and tolerated that well. She does report that even though she was diagnosed with sleep apnea she does not feel bad during the day and states that she awakens every morning fully rested. Kathleen score is 1/24. She relates feeling better to after she received a pacemaker last year. States that since the pacemaker was placed she has felt better upon awakening and has no daytime sleepiness. At this point, she did not meet Medicare compliance and her CPAP machine will be returned to the [facility].   We will refer to dentistry for dental appliance evaluation today    Sleep Medicine 12/27/2022 8/22/2022 6/7/2022   Sitting and reading 0 0 1   Watching TV 0 0 1   Sitting, inactive in a public place (e.g. a theatre or a meeting) 0 0 1   As a passenger in a car for an hour without a break 0 0 2   Lying down to rest in the afternoon when circumstances permit 1 1 3   Sitting and talking to someone 0 0 1   Sitting quietly after a lunch without alcohol 0 0 2   In a car, while stopped for a few minutes in traffic 0 0 0   [place] Sleepiness Score 1 1 11                Past Medical History:   Diagnosis Date    Adverse effect of anesthesia     patient states she is slower to wake up than other and feels groggy longer    Anxiety     Atrial fibrillation (HCC)     Depression     Diabetes ([address]     Hypercholesterolemia     Hypertension     IBS (irritable bowel syndrome)     Lumbar disc herniation     Pacemaker     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 ([address]     Sjogren's syndrome ([facility])     dry eye and fatigue are her symptoms    Sleep apnea     patient does not use a c-pap    SSS (sick sinus syndrome) ([facility])     TBI (traumatic brain injury)     Thyroid disease     hypothyroidism         Patient Active Problem List   Diagnosis    Anxiety    Post-traumatic headache    DM (diabetes mellitus) ([address]    Age-related nuclear cataract of both eyes    Statin intolerance    Symptomatic bradycardia    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 (HCC)    Depression, major, single episode, moderate ([facility])    Hyperlipidemia associated with type 2 diabetes mellitus (HCC)    Dry eye syndrome, bilateral    Pacemaker    Sjogren's syndrome (HCC)    Primary hypothyroidism    IBS (irritable bowel syndrome)    Primary hypertension    Acute midline low back pain without sciatica    Allergic blepharitis, left    Angular blepharitis of both eyes    Myopia of both eyes with regular astigmatism and presbyopia    Posterior vitreous detachment, both eyes    Controlled type 2 diabetes mellitus without complication, without long-term current use of insulin ([address]          Past Surgical History:   Procedure Laterality Date    CHOLECYSTECTOMY, LAPAROSCOPIC  11/2007    COLONOSCOPY Amoxicillin Other (See Comments)     Tolerates up to 500 mg dose OK  Other reaction(s): Other- (not listed) - Allergy  Other reaction(s): Vertigo    Cortisone Other (See Comments)     Other reaction(s): Other- (not listed) - Allergy  Other reaction(s): Other (comments)    Linagliptin Other (See Comments)     floaters    Metformin And Related Diarrhea    Methylcellulose Other (See Comments)     Other reaction(s): Other- (not listed) - Allergy  Other reaction(s): Other (comments)         Current Outpatient Medications   Medication Sig    colestipol (COLESTID) 1 g tablet Take 1 tablet by mouth 2 times daily    amLODIPine (NORVASC) 2.5 MG tablet TAKE ONE TABLET BY MOUTH ONE TIME DAILY    losartan (COZAAR) 50 MG tablet Take 1 tablet by mouth 2 times daily    ULTICARE MICRO PEN NEEDLES 32G X 4 MM MISC USE 1 NEEDLE DAILY WITH INSULIN    insulin glargine (LANTUS;BASAGLAR) 100 UNIT/ML injection pen Inject 28 Units into the skin in the morning. thyroid (ARMOUR) 30 MG tablet Take 1 tablet by mouth in the morning. apixaban (ELIQUIS) 5 MG TABS tablet Take 1 tablet by mouth in the morning and 1 tablet before bedtime. ZINC SULFATE ER PO Take by mouth daily    acetaminophen (TYLENOL) 325 MG tablet Take 650 mg by mouth every 4 hours as needed    ascorbic acid (VITAMIN C) 500 MG tablet Take by mouth daily    ezetimibe (ZETIA) 10 MG tablet Take 10 mg by mouth daily (Patient not taking: Reported on 12/27/2022)    Evolocumab 140 MG/ML SOAJ Inject 2 pens into the skin every 14 days (Patient not taking: Reported on 12/27/2022)    fluticasone (FLONASE) 50 MCG/ACT nasal spray 2 sprays by Nasal route as needed (Patient not taking: Reported on 12/27/2022)     No current facility-administered medications for this visit. REVIEW OF SYSTEMS:   CONSTITUTIONAL:   There is no history of fever, chills, night sweats, weight loss, weight gain, persistent fatigue, or lethargy/hypersomnolence.    CARDIAC:   No chest pain, pressure, discomfort, palpitations, orthopnea, murmurs, or edema. GI:   No dysphagia, heartburn reflux, nausea/vomiting, diarrhea, abdominal pain, or bleeding. NEURO:   There is no history of AMS, persistent headache, decreased level of consciousness, seizures, or motor or sensory deficits. PHYSICAL EXAM:    Vitals:    12/27/22 1547   BP: (!) 172/91   Site: Right Upper Arm   Position: Sitting   Cuff Size: Medium Adult   Pulse: 93   Resp: 17   Temp: 97.1 °F (36.2 °C)   TempSrc: Skin   SpO2: 96%  Comment: Room air   Weight: 132 lb (59.9 kg)   Height: 5' (1.524 m)          BMI: 23.96        GENERAL APPEARANCE:   The patient is normal weight and in no respiratory distress. HEENT:   PERRL. Conjunctivae unremarkable. Nasal mucosa is without epistaxis, exudate, or polyps. Nares patent bilateral.  Gums and dentition are unremarkable. There is oropharyngeal narrowing. Kim score 4. NECK/LYMPHATIC:   Symmetrical with no elevation of jugular venous pulsation. Trachea midline. No thyroid enlargement. No cervical adenopathy. LUNGS:   Normal respiratory effort with symmetrical lung expansion. Breath sounds clear. HEART:   There is a regular rate and rhythm. No murmur, rub, or gallop. There is no edema in the lower extremities. ABDOMEN:   Soft and non-tender. No hepatosplenomegaly. Bowel sounds are normal.     NEURO:   The patient is alert and oriented to person, place, and time. Memory appears intact and mood is normal.  No gross sensorimotor deficits are present. ASSESSMENT:  (Medical Decision Making)          ICD-10-CM    1. GABRIEL (obstructive sleep apnea)  G47.33 Mild sleep apnea with an AHI of 12.2 and lowest oxygen desaturation of 84%. Patient did trial CPAP therapy and was unable to tolerate. Did not meet Medicare compliance. We will refer to dentistry today. 2. Nocturnal hypoxemia  G47.34 Referral to dentistry for potential dental appliance to correct sleep apnea      3. Difficulty with CPAP use  Z78.9 Could not tolerate CPAP due to claustrophobia      4. Bruxism (teeth grinding)  F45.8 Referral to dentistry for dental appliance which could correct sleep apnea and help with teeth grinding           PLAN:    Referral to sleep medicine dentistry  Recommendations as above  Follow-up in 3 months if not a candidate for dental appliance or sooner if needed      Orders Placed This Encounter   Procedures    External Referral To Dentistry     Referral Priority:   Routine     Referral Type:   Eval and Treat     Referral Reason:   Specialty Services Required     Requested Specialty:   Dentistry     Number of Visits Requested:   1                 Collaborating Physician: Dr. Drea Snider    Over 50% of today's office visit was spent in face to face time reviewing test results, prognosis, importance of compliance, education about disease process, benefits of medications, instructions for management of acute flare-ups, and follow up plans. Total face to face time spent with patient was 20 minutes.         [address], APRN - CNP  Electronically signed

## 2022-12-27 ENCOUNTER — OFFICE VISIT (OUTPATIENT)
Dept: SLEEP MEDICINE | Age: 72
End: 2022-12-27
Payer: MEDICARE

## 2022-12-27 VITALS
HEART RATE: 93 BPM | TEMPERATURE: 97.1 F | WEIGHT: 132 LBS | DIASTOLIC BLOOD PRESSURE: 91 MMHG | BODY MASS INDEX: 25.91 KG/M2 | RESPIRATION RATE: 17 BRPM | HEIGHT: 60 IN | OXYGEN SATURATION: 96 % | SYSTOLIC BLOOD PRESSURE: 172 MMHG

## 2022-12-27 DIAGNOSIS — Z78.9 DIFFICULTY WITH CPAP USE: ICD-10-CM

## 2022-12-27 DIAGNOSIS — F45.8 BRUXISM (TEETH GRINDING): ICD-10-CM

## 2022-12-27 DIAGNOSIS — G47.33 OSA (OBSTRUCTIVE SLEEP APNEA): Primary | ICD-10-CM

## 2022-12-27 DIAGNOSIS — G47.34 NOCTURNAL HYPOXEMIA: ICD-10-CM

## 2022-12-27 PROCEDURE — 1036F TOBACCO NON-USER: CPT | Performed by: NURSE PRACTITIONER

## 2022-12-27 PROCEDURE — 1090F PRES/ABSN URINE INCON ASSESS: CPT | Performed by: NURSE PRACTITIONER

## 2022-12-27 PROCEDURE — G8417 CALC BMI ABV UP PARAM F/U: HCPCS | Performed by: NURSE PRACTITIONER

## 2022-12-27 PROCEDURE — G8484 FLU IMMUNIZE NO ADMIN: HCPCS | Performed by: NURSE PRACTITIONER

## 2022-12-27 PROCEDURE — 1123F ACP DISCUSS/DSCN MKR DOCD: CPT | Performed by: NURSE PRACTITIONER

## 2022-12-27 PROCEDURE — 3017F COLORECTAL CA SCREEN DOC REV: CPT | Performed by: NURSE PRACTITIONER

## 2022-12-27 PROCEDURE — G8427 DOCREV CUR MEDS BY ELIG CLIN: HCPCS | Performed by: NURSE PRACTITIONER

## 2022-12-27 PROCEDURE — 3074F SYST BP LT 130 MM HG: CPT | Performed by: NURSE PRACTITIONER

## 2022-12-27 PROCEDURE — G8400 PT W/DXA NO RESULTS DOC: HCPCS | Performed by: NURSE PRACTITIONER

## 2022-12-27 PROCEDURE — 99213 OFFICE O/P EST LOW 20 MIN: CPT | Performed by: NURSE PRACTITIONER

## 2022-12-27 PROCEDURE — 3078F DIAST BP <80 MM HG: CPT | Performed by: NURSE PRACTITIONER

## 2022-12-27 ASSESSMENT — SLEEP AND FATIGUE QUESTIONNAIRES
HOW LIKELY ARE YOU TO NOD OFF OR FALL ASLEEP IN A CAR, WHILE STOPPED FOR A FEW MINUTES IN TRAFFIC: 0
HOW LIKELY ARE YOU TO NOD OFF OR FALL ASLEEP WHEN YOU ARE A PASSENGER IN A CAR FOR AN HOUR WITHOUT A BREAK: 0
HOW LIKELY ARE YOU TO NOD OFF OR FALL ASLEEP WHILE SITTING QUIETLY AFTER LUNCH WITHOUT ALCOHOL: 0
HOW LIKELY ARE YOU TO NOD OFF OR FALL ASLEEP WHILE SITTING INACTIVE IN A PUBLIC PLACE: 0
HOW LIKELY ARE YOU TO NOD OFF OR FALL ASLEEP WHILE WATCHING TV: 0
HOW LIKELY ARE YOU TO NOD OFF OR FALL ASLEEP WHILE LYING DOWN TO REST IN THE AFTERNOON WHEN CIRCUMSTANCES PERMIT: 1
ESS TOTAL SCORE: 1
HOW LIKELY ARE YOU TO NOD OFF OR FALL ASLEEP WHILE SITTING AND TALKING TO SOMEONE: 0
HOW LIKELY ARE YOU TO NOD OFF OR FALL ASLEEP WHILE SITTING AND READING: 0

## 2022-12-27 NOTE — PATIENT INSTRUCTIONS
Referral to sleep medicine dentistry  Recommendations as above  Follow-up in 3 months if not a candidate for dental appliance or sooner if needed

## 2023-01-10 DIAGNOSIS — Z95.0 PACEMAKER: ICD-10-CM

## 2023-01-10 DIAGNOSIS — R00.1 SYMPTOMATIC BRADYCARDIA: ICD-10-CM

## 2023-02-08 ENCOUNTER — NURSE ONLY (OUTPATIENT)
Dept: FAMILY MEDICINE CLINIC | Facility: CLINIC | Age: 73
End: 2023-02-08

## 2023-02-08 DIAGNOSIS — E11.65 TYPE 2 DIABETES MELLITUS WITH HYPERGLYCEMIA, WITH LONG-TERM CURRENT USE OF INSULIN (HCC): ICD-10-CM

## 2023-02-08 DIAGNOSIS — Z79.4 TYPE 2 DIABETES MELLITUS WITH HYPERGLYCEMIA, WITH LONG-TERM CURRENT USE OF INSULIN (HCC): ICD-10-CM

## 2023-02-08 DIAGNOSIS — E78.2 MIXED HYPERLIPIDEMIA: ICD-10-CM

## 2023-02-08 LAB
EST. AVERAGE GLUCOSE BLD GHB EST-MCNC: 183 MG/DL
HBA1C MFR BLD: 8 % (ref 4.8–5.6)

## 2023-02-09 LAB
ALBUMIN SERPL-MCNC: 3.7 G/DL (ref 3.2–4.6)
ALBUMIN/GLOB SERPL: 0.9 (ref 0.4–1.6)
ALP SERPL-CCNC: 52 U/L (ref 50–136)
ALT SERPL-CCNC: 28 U/L (ref 12–65)
ANION GAP SERPL CALC-SCNC: 13 MMOL/L (ref 2–11)
AST SERPL-CCNC: 19 U/L (ref 15–37)
BILIRUB SERPL-MCNC: 0.8 MG/DL (ref 0.2–1.1)
BUN SERPL-MCNC: 20 MG/DL (ref 8–23)
CALCIUM SERPL-MCNC: 9.8 MG/DL (ref 8.3–10.4)
CHLORIDE SERPL-SCNC: 106 MMOL/L (ref 101–110)
CHOLEST SERPL-MCNC: 272 MG/DL
CO2 SERPL-SCNC: 24 MMOL/L (ref 21–32)
CREAT SERPL-MCNC: 1 MG/DL (ref 0.6–1)
GLOBULIN SER CALC-MCNC: 3.9 G/DL (ref 2.8–4.5)
GLUCOSE SERPL-MCNC: 137 MG/DL (ref 65–100)
HDLC SERPL-MCNC: 63 MG/DL (ref 40–60)
HDLC SERPL: 4.3
LDLC SERPL CALC-MCNC: 182.6 MG/DL
POTASSIUM SERPL-SCNC: 4 MMOL/L (ref 3.5–5.1)
PROT SERPL-MCNC: 7.6 G/DL (ref 6.3–8.2)
SODIUM SERPL-SCNC: 143 MMOL/L (ref 133–143)
TRIGL SERPL-MCNC: 132 MG/DL (ref 35–150)
VLDLC SERPL CALC-MCNC: 26.4 MG/DL (ref 6–23)

## 2023-02-16 ENCOUNTER — TELEPHONE (OUTPATIENT)
Dept: SLEEP MEDICINE | Age: 73
End: 2023-02-16

## 2023-02-16 ENCOUNTER — OFFICE VISIT (OUTPATIENT)
Dept: FAMILY MEDICINE CLINIC | Facility: CLINIC | Age: 73
End: 2023-02-16
Payer: MEDICARE

## 2023-02-16 VITALS
DIASTOLIC BLOOD PRESSURE: 80 MMHG | HEART RATE: 89 BPM | RESPIRATION RATE: 18 BRPM | TEMPERATURE: 98 F | HEIGHT: 60 IN | SYSTOLIC BLOOD PRESSURE: 172 MMHG | WEIGHT: 132.8 LBS | BODY MASS INDEX: 26.07 KG/M2 | OXYGEN SATURATION: 98 %

## 2023-02-16 DIAGNOSIS — E78.2 MIXED HYPERLIPIDEMIA: ICD-10-CM

## 2023-02-16 DIAGNOSIS — E11.65 TYPE 2 DIABETES MELLITUS WITH HYPERGLYCEMIA, WITH LONG-TERM CURRENT USE OF INSULIN (HCC): Primary | ICD-10-CM

## 2023-02-16 DIAGNOSIS — E03.9 PRIMARY HYPOTHYROIDISM: ICD-10-CM

## 2023-02-16 DIAGNOSIS — I48.0 PAROXYSMAL ATRIAL FIBRILLATION (HCC): ICD-10-CM

## 2023-02-16 DIAGNOSIS — I10 HYPERTENSION, UNCONTROLLED: ICD-10-CM

## 2023-02-16 DIAGNOSIS — Z79.4 TYPE 2 DIABETES MELLITUS WITH HYPERGLYCEMIA, WITH LONG-TERM CURRENT USE OF INSULIN (HCC): Primary | ICD-10-CM

## 2023-02-16 PROCEDURE — 1036F TOBACCO NON-USER: CPT | Performed by: FAMILY MEDICINE

## 2023-02-16 PROCEDURE — 99215 OFFICE O/P EST HI 40 MIN: CPT | Performed by: FAMILY MEDICINE

## 2023-02-16 PROCEDURE — 1090F PRES/ABSN URINE INCON ASSESS: CPT | Performed by: FAMILY MEDICINE

## 2023-02-16 PROCEDURE — 1123F ACP DISCUSS/DSCN MKR DOCD: CPT | Performed by: FAMILY MEDICINE

## 2023-02-16 PROCEDURE — 3052F HG A1C>EQUAL 8.0%<EQUAL 9.0%: CPT | Performed by: FAMILY MEDICINE

## 2023-02-16 PROCEDURE — 3077F SYST BP >= 140 MM HG: CPT | Performed by: FAMILY MEDICINE

## 2023-02-16 PROCEDURE — G8400 PT W/DXA NO RESULTS DOC: HCPCS | Performed by: FAMILY MEDICINE

## 2023-02-16 PROCEDURE — 2022F DILAT RTA XM EVC RTNOPTHY: CPT | Performed by: FAMILY MEDICINE

## 2023-02-16 PROCEDURE — G8427 DOCREV CUR MEDS BY ELIG CLIN: HCPCS | Performed by: FAMILY MEDICINE

## 2023-02-16 PROCEDURE — G8484 FLU IMMUNIZE NO ADMIN: HCPCS | Performed by: FAMILY MEDICINE

## 2023-02-16 PROCEDURE — 3017F COLORECTAL CA SCREEN DOC REV: CPT | Performed by: FAMILY MEDICINE

## 2023-02-16 PROCEDURE — 3079F DIAST BP 80-89 MM HG: CPT | Performed by: FAMILY MEDICINE

## 2023-02-16 PROCEDURE — G8417 CALC BMI ABV UP PARAM F/U: HCPCS | Performed by: FAMILY MEDICINE

## 2023-02-16 RX ORDER — LOSARTAN POTASSIUM 50 MG/1
50 TABLET ORAL 2 TIMES DAILY
Qty: 180 TABLET | Refills: 3 | Status: SHIPPED | OUTPATIENT
Start: 2023-02-16

## 2023-02-16 RX ORDER — AMLODIPINE BESYLATE 2.5 MG/1
TABLET ORAL
Qty: 90 TABLET | Refills: 3 | Status: SHIPPED | OUTPATIENT
Start: 2023-02-16

## 2023-02-16 RX ORDER — MONTELUKAST SODIUM 4 MG/1
1 TABLET, CHEWABLE ORAL 2 TIMES DAILY
Qty: 180 TABLET | Refills: 3 | Status: SHIPPED | OUTPATIENT
Start: 2023-02-16

## 2023-02-16 RX ORDER — LEVOTHYROXINE AND LIOTHYRONINE 19; 4.5 UG/1; UG/1
30 TABLET ORAL DAILY
Qty: 90 TABLET | Refills: 3 | Status: SHIPPED | OUTPATIENT
Start: 2023-02-16

## 2023-02-16 SDOH — ECONOMIC STABILITY: FOOD INSECURITY: WITHIN THE PAST 12 MONTHS, YOU WORRIED THAT YOUR FOOD WOULD RUN OUT BEFORE YOU GOT MONEY TO BUY MORE.: NEVER TRUE

## 2023-02-16 SDOH — ECONOMIC STABILITY: HOUSING INSECURITY
IN THE LAST 12 MONTHS, WAS THERE A TIME WHEN YOU DID NOT HAVE A STEADY PLACE TO SLEEP OR SLEPT IN A SHELTER (INCLUDING NOW)?: NO

## 2023-02-16 SDOH — ECONOMIC STABILITY: FOOD INSECURITY: WITHIN THE PAST 12 MONTHS, THE FOOD YOU BOUGHT JUST DIDN'T LAST AND YOU DIDN'T HAVE MONEY TO GET MORE.: NEVER TRUE

## 2023-02-16 SDOH — ECONOMIC STABILITY: INCOME INSECURITY: HOW HARD IS IT FOR YOU TO PAY FOR THE VERY BASICS LIKE FOOD, HOUSING, MEDICAL CARE, AND HEATING?: NOT HARD AT ALL

## 2023-02-16 ASSESSMENT — PATIENT HEALTH QUESTIONNAIRE - PHQ9
5. POOR APPETITE OR OVEREATING: 0
7. TROUBLE CONCENTRATING ON THINGS, SUCH AS READING THE NEWSPAPER OR WATCHING TELEVISION: 0
SUM OF ALL RESPONSES TO PHQ QUESTIONS 1-9: 0
10. IF YOU CHECKED OFF ANY PROBLEMS, HOW DIFFICULT HAVE THESE PROBLEMS MADE IT FOR YOU TO DO YOUR WORK, TAKE CARE OF THINGS AT HOME, OR GET ALONG WITH OTHER PEOPLE: 0
SUM OF ALL RESPONSES TO PHQ QUESTIONS 1-9: 0
SUM OF ALL RESPONSES TO PHQ9 QUESTIONS 1 & 2: 0
6. FEELING BAD ABOUT YOURSELF - OR THAT YOU ARE A FAILURE OR HAVE LET YOURSELF OR YOUR FAMILY DOWN: 0
3. TROUBLE FALLING OR STAYING ASLEEP: 0
4. FEELING TIRED OR HAVING LITTLE ENERGY: 0
SUM OF ALL RESPONSES TO PHQ QUESTIONS 1-9: 0
8. MOVING OR SPEAKING SO SLOWLY THAT OTHER PEOPLE COULD HAVE NOTICED. OR THE OPPOSITE, BEING SO FIGETY OR RESTLESS THAT YOU HAVE BEEN MOVING AROUND A LOT MORE THAN USUAL: 0
9. THOUGHTS THAT YOU WOULD BE BETTER OFF DEAD, OR OF HURTING YOURSELF: 0
1. LITTLE INTEREST OR PLEASURE IN DOING THINGS: 0
2. FEELING DOWN, DEPRESSED OR HOPELESS: 0
SUM OF ALL RESPONSES TO PHQ QUESTIONS 1-9: 0
2. FEELING DOWN, DEPRESSED OR HOPELESS: 0

## 2023-02-16 ASSESSMENT — ENCOUNTER SYMPTOMS
RESPIRATORY NEGATIVE: 1
GASTROINTESTINAL NEGATIVE: 1

## 2023-02-16 NOTE — PROGRESS NOTES
Saturnino Baker (:  1950) is a 67 y.o. female,Established patient, here for evaluation of the following chief complaint(s):  3 Month Follow-Up (Needs referral for counselor       ASSESSMENT/PLAN:  1. Type 2 diabetes mellitus with hyperglycemia, with long-term current use of insulin (HCC)  -     insulin glargine (LANTUS;BASAGLAR) 100 UNIT/ML injection pen; Use 32 units daily. If blood sugar still above 140 in the mornign; increase 2 units every 3 days until blood sugar below 140. Maximum 50 units per day, Disp-5 Adjustable Dose Pre-filled Pen Syringe, R-5Normal  -      DIABETES FOOT EXAM  2. Hypertension, uncontrolled  -     amLODIPine (NORVASC) 2.5 MG tablet; TAKE ONE TABLET BY MOUTH ONE TIME DAILY, Disp-90 tablet, R-3Normal  -     losartan (COZAAR) 50 MG tablet; Take 1 tablet by mouth 2 times daily, Disp-180 tablet, R-3Normal  3. Paroxysmal atrial fibrillation (HCC)  -     apixaban (ELIQUIS) 5 MG TABS tablet; Take 1 tablet by mouth 2 times daily, Disp-180 tablet, R-3Normal  4. Mixed hyperlipidemia  -     colestipol (COLESTID) 1 g tablet; Take 1 tablet by mouth 2 times daily, Disp-180 tablet, R-3Normal  5. Primary hypothyroidism  -     thyroid (ARMOUR) 30 MG tablet; Take 1 tablet by mouth daily, Disp-90 tablet, R-3Print    Plan:  No change in medication  Continue to watch blood pressure at home. If above 150/90 consistently then let us know. I have recommended some counselors to her. She will call them. Increase insulin as needed as above to get fasting blood sugar below 140. Consider shingles vaccine. Get home test for COVID and take if have symptoms. Call office if positive. Subjective   SUBJECTIVE/OBJECTIVE:  HPI  68 yo female wth underlying a-fib, IDDM2, GABRIEL, uncontrolled HTN,         1) hypertension/atrial fibrillation: Has had a pacemaker implanted. Blood pressure at home: 674-798 systolic;  diastolic. Last night 141/75.   She re[ports h/o white coat hypertension. 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 She is takign losartan to 50 mg BID. Takign amlodipine 2.5 mg most days. Declines additional medication at this time. 2)  IDDM:  Hemoglobin A1C   Date Value Ref Range Status   02/08/2023 8.0 (H) 4.8 - 5.6 % Final     Lantus: 32 units daily. (Takes after breakfast)  At home her BGLs are  120-160  Does not check before dinner. Willing to start  Cannot tolerate metformin. 3) hypothyroidism        Lab Results   Component Value Date     Lab Results   Component Value Date    TSH 0.551 01/14/2022    EYF4SCN 1.060 11/10/2022   On same dose of [place] thyroid (30 mg) for some time. 4)  hyperlipidemia:   Could not afford zetia. Could tolerate statins due to muscle aches and dizziness. Her cholesterol is elevated despite startign colestipol 3 months ago. Her cardiologist is trying to start Evolocumab but she needs a prior authorization. She desires to try colestipol. CV RISK score 40%. Colonoscopy in 2018; due now; declines for now. Review of Systems   Constitutional: Negative. Respiratory: Negative. Cardiovascular: Negative. Gastrointestinal: Negative. Musculoskeletal: Negative. Skin: Negative. Hematological: Negative. Psychiatric/Behavioral: Negative. Objective   Physical Exam  Vitals and nursing note reviewed. Constitutional:       General: She is not in acute distress. Appearance: She is not ill-appearing or toxic-appearing. Cardiovascular:      Rate and Rhythm: Normal rate and regular rhythm. Pulses:           Dorsalis pedis pulses are 1+ on the right side and 1+ on the left side. Musculoskeletal:      Right foot: Normal range of motion. No deformity. Left foot: Normal range of motion. No deformity.    Feet:      Right foot:      Protective Sensation: 5 sites tested. 5 sites sensed. Skin integrity: Skin integrity normal.      Left foot:      Protective Sensation: 5 sites tested. 5 sites sensed. Skin integrity: Skin integrity normal.   Skin:     General: Skin is warm. Capillary Refill: Capillary refill takes less than 2 seconds. Neurological:      General: No focal deficit present. Mental Status: She is alert and oriented to person, place, and time. Cranial Nerves: No cranial nerve deficit. Sensory: No sensory deficit. Psychiatric:         Mood and Affect: Mood normal.         Behavior: Behavior normal.         Thought Content: Thought content normal.         Judgment: Judgment normal.        Vitals:    02/16/23 1206   BP: (!) 172/80   Pulse:    Resp:    Temp:    SpO2:        On this date 2/16/2023 I have spent 30 minutes reviewing previous notes, test results and face to face with the patient discussing the diagnosis and importance of compliance with the treatment plan as well as documenting on the day of the visit. An electronic signature was used to authenticate this note.     --Rufina Farooq MD, MD

## 2023-02-27 ENCOUNTER — TELEPHONE (OUTPATIENT)
Dept: FAMILY MEDICINE CLINIC | Facility: CLINIC | Age: 73
End: 2023-02-27

## 2023-02-27 DIAGNOSIS — I67.83 POSTERIOR REVERSIBLE ENCEPHALOPATHY SYNDROME: Primary | ICD-10-CM

## 2023-02-27 NOTE — TELEPHONE ENCOUNTER
Patient called stating that she needs a referral to a new neurologist. She can no longer see her previous neurologist    Requesting referral be sent to [facility]  Shaggy Hebert MD

## 2023-03-10 DIAGNOSIS — E78.2 MIXED HYPERLIPIDEMIA: ICD-10-CM

## 2023-03-10 NOTE — TELEPHONE ENCOUNTER
Pt called stating that she is needing the Colestipol to be sent to [facility] again. States that [facility] is saying that they do not have an Rx for this. Last OV 2/16/23. No follow up scheduled.

## 2023-03-12 RX ORDER — MONTELUKAST SODIUM 4 MG/1
1 TABLET, CHEWABLE ORAL 2 TIMES DAILY
Qty: 180 TABLET | Refills: 3 | Status: SHIPPED | OUTPATIENT
Start: 2023-03-12

## 2023-03-24 NOTE — PROGRESS NOTES
(Medical Decision Making)          ICD-10-CM    1. GABRIEL (obstructive sleep apnea)  G47.33 Mild sleep apnea with an AHI of 12.2 and lowest desaturation of 84%. Patient previously trialed CPAP but did not meet Medicare compliance. She was referred to dentistry but chose not to do a dental appliance. She would like to try CPAP again to correct her sleep apnea. New sleep study will be ordered today. Patient wants to try the ResMed nasal pillows with CPAP therapy      2. Persistent disorder of initiating or maintaining sleep  G47.00 Anticipate this is largely related to uncontrolled sleep apnea. Advised patient to try melatonin 5 mg at bedtime. 3. Hypersomnolence  G47.10 Sleep study ordered. Anticipate this is due to nonrestorative sleep due to uncontrolled sleep apnea            PLAN:    Home sleep study ordered  Recommendations as above  Follow-up after sleep study or 3 months after starting CPAP therapy or sooner if needed          Collaborating Physician: Dr. Ruddy Birmingham    Over 50% of today's office visit was spent in face to face time reviewing test results, prognosis, importance of compliance, education about disease process, benefits of medications, instructions for management of acute flare-ups, and follow up plans. Total face to face time spent with patient was 20 minutes.         [address], APRN - CNP  Electronically signed

## 2023-03-27 ENCOUNTER — OFFICE VISIT (OUTPATIENT)
Dept: SLEEP MEDICINE | Age: 73
End: 2023-03-27
Payer: MEDICARE

## 2023-03-27 VITALS
DIASTOLIC BLOOD PRESSURE: 80 MMHG | TEMPERATURE: 97.4 F | BODY MASS INDEX: 24.48 KG/M2 | HEART RATE: 102 BPM | HEIGHT: 62 IN | SYSTOLIC BLOOD PRESSURE: 162 MMHG | OXYGEN SATURATION: 99 % | WEIGHT: 133 LBS

## 2023-03-27 DIAGNOSIS — G47.10 HYPERSOMNOLENCE: ICD-10-CM

## 2023-03-27 DIAGNOSIS — G47.00 PERSISTENT DISORDER OF INITIATING OR MAINTAINING SLEEP: ICD-10-CM

## 2023-03-27 DIAGNOSIS — G47.33 OSA (OBSTRUCTIVE SLEEP APNEA): Primary | ICD-10-CM

## 2023-03-27 PROCEDURE — G8484 FLU IMMUNIZE NO ADMIN: HCPCS | Performed by: NURSE PRACTITIONER

## 2023-03-27 PROCEDURE — 1090F PRES/ABSN URINE INCON ASSESS: CPT | Performed by: NURSE PRACTITIONER

## 2023-03-27 PROCEDURE — 3079F DIAST BP 80-89 MM HG: CPT | Performed by: NURSE PRACTITIONER

## 2023-03-27 PROCEDURE — G8420 CALC BMI NORM PARAMETERS: HCPCS | Performed by: NURSE PRACTITIONER

## 2023-03-27 PROCEDURE — 3017F COLORECTAL CA SCREEN DOC REV: CPT | Performed by: NURSE PRACTITIONER

## 2023-03-27 PROCEDURE — G8427 DOCREV CUR MEDS BY ELIG CLIN: HCPCS | Performed by: NURSE PRACTITIONER

## 2023-03-27 PROCEDURE — 99213 OFFICE O/P EST LOW 20 MIN: CPT | Performed by: NURSE PRACTITIONER

## 2023-03-27 PROCEDURE — 1123F ACP DISCUSS/DSCN MKR DOCD: CPT | Performed by: NURSE PRACTITIONER

## 2023-03-27 PROCEDURE — G8400 PT W/DXA NO RESULTS DOC: HCPCS | Performed by: NURSE PRACTITIONER

## 2023-03-27 PROCEDURE — 3077F SYST BP >= 140 MM HG: CPT | Performed by: NURSE PRACTITIONER

## 2023-03-27 PROCEDURE — 1036F TOBACCO NON-USER: CPT | Performed by: NURSE PRACTITIONER

## 2023-03-27 ASSESSMENT — SLEEP AND FATIGUE QUESTIONNAIRES
HOW LIKELY ARE YOU TO NOD OFF OR FALL ASLEEP IN A CAR, WHILE STOPPED FOR A FEW MINUTES IN TRAFFIC: 0
HOW LIKELY ARE YOU TO NOD OFF OR FALL ASLEEP WHILE LYING DOWN TO REST IN THE AFTERNOON WHEN CIRCUMSTANCES PERMIT: 3
HOW LIKELY ARE YOU TO NOD OFF OR FALL ASLEEP WHILE SITTING AND TALKING TO SOMEONE: 0
HOW LIKELY ARE YOU TO NOD OFF OR FALL ASLEEP WHEN YOU ARE A PASSENGER IN A CAR FOR AN HOUR WITHOUT A BREAK: 2
HOW LIKELY ARE YOU TO NOD OFF OR FALL ASLEEP WHILE SITTING AND READING: 0
HOW LIKELY ARE YOU TO NOD OFF OR FALL ASLEEP WHILE WATCHING TV: 0
ESS TOTAL SCORE: 5
HOW LIKELY ARE YOU TO NOD OFF OR FALL ASLEEP WHILE SITTING QUIETLY AFTER LUNCH WITHOUT ALCOHOL: 0
HOW LIKELY ARE YOU TO NOD OFF OR FALL ASLEEP WHILE SITTING INACTIVE IN A PUBLIC PLACE: 0

## 2023-03-27 NOTE — PATIENT INSTRUCTIONS
Home sleep study ordered  Recommendations as above  Follow-up after sleep study or 3 months after starting CPAP therapy or sooner if needed

## 2023-04-05 ENCOUNTER — TELEPHONE (OUTPATIENT)
Dept: CARDIOLOGY CLINIC | Age: 73
End: 2023-04-05

## 2023-04-05 NOTE — TELEPHONE ENCOUNTER
Pt states that her neurologist wanted her to call to see if she was MRI compatible because of her pacemaker

## 2023-04-16 ENCOUNTER — HOSPITAL ENCOUNTER (OUTPATIENT)
Dept: SLEEP CENTER | Age: 73
Discharge: HOME | End: 2023-04-19
Payer: MEDICARE

## 2023-04-16 PROCEDURE — 95810 POLYSOM 6/> YRS 4/> PARAM: CPT

## 2023-04-30 ASSESSMENT — ENCOUNTER SYMPTOMS
ABDOMINAL PAIN: 0
ABDOMINAL DISTENTION: 0
DIARRHEA: 0
PHOTOPHOBIA: 0
SHORTNESS OF BREATH: 0
CONSTIPATION: 0
SORE THROAT: 0
COUGH: 0

## 2023-05-05 ENCOUNTER — OFFICE VISIT (OUTPATIENT)
Dept: CARDIOLOGY CLINIC | Age: 73
End: 2023-05-05

## 2023-05-05 VITALS
WEIGHT: 132 LBS | SYSTOLIC BLOOD PRESSURE: 150 MMHG | HEART RATE: 96 BPM | HEIGHT: 62 IN | DIASTOLIC BLOOD PRESSURE: 84 MMHG | BODY MASS INDEX: 24.29 KG/M2

## 2023-05-05 DIAGNOSIS — I10 HYPERTENSION, UNCONTROLLED: ICD-10-CM

## 2023-05-05 DIAGNOSIS — E11.9 CONTROLLED TYPE 2 DIABETES MELLITUS WITHOUT COMPLICATION, WITHOUT LONG-TERM CURRENT USE OF INSULIN (HCC): ICD-10-CM

## 2023-05-05 DIAGNOSIS — Z95.0 PACEMAKER: ICD-10-CM

## 2023-05-05 DIAGNOSIS — R00.1 SYMPTOMATIC BRADYCARDIA: ICD-10-CM

## 2023-05-05 DIAGNOSIS — E78.5 HYPERLIPIDEMIA ASSOCIATED WITH TYPE 2 DIABETES MELLITUS (HCC): ICD-10-CM

## 2023-05-05 DIAGNOSIS — I48.0 PAROXYSMAL ATRIAL FIBRILLATION (HCC): Primary | ICD-10-CM

## 2023-05-05 DIAGNOSIS — G47.33 OSA (OBSTRUCTIVE SLEEP APNEA): ICD-10-CM

## 2023-05-05 DIAGNOSIS — E78.2 MIXED HYPERLIPIDEMIA: ICD-10-CM

## 2023-05-05 DIAGNOSIS — E11.69 HYPERLIPIDEMIA ASSOCIATED WITH TYPE 2 DIABETES MELLITUS (HCC): ICD-10-CM

## 2023-05-05 LAB
ALBUMIN SERPL-MCNC: 3.7 G/DL (ref 3.2–4.6)
ALBUMIN/GLOB SERPL: 1.1 (ref 0.4–1.6)
ALP SERPL-CCNC: 64 U/L (ref 50–136)
ALT SERPL-CCNC: 28 U/L (ref 12–65)
AST SERPL-CCNC: 15 U/L (ref 15–37)
BILIRUB DIRECT SERPL-MCNC: <0.1 MG/DL
BILIRUB SERPL-MCNC: 0.4 MG/DL (ref 0.2–1.1)
CHOLEST SERPL-MCNC: 270 MG/DL
GLOBULIN SER CALC-MCNC: 3.4 G/DL (ref 2.8–4.5)
HDLC SERPL-MCNC: 63 MG/DL (ref 40–60)
HDLC SERPL: 4.3
LDLC SERPL CALC-MCNC: 157.4 MG/DL
PROT SERPL-MCNC: 7.1 G/DL (ref 6.3–8.2)
TRIGL SERPL-MCNC: 248 MG/DL (ref 35–150)
VLDLC SERPL CALC-MCNC: 49.6 MG/DL (ref 6–23)

## 2023-05-05 RX ORDER — LOSARTAN POTASSIUM 100 MG/1
100 TABLET ORAL DAILY
Qty: 90 TABLET | Refills: 3 | Status: SHIPPED | OUTPATIENT
Start: 2023-05-05

## 2023-05-05 RX ORDER — AMLODIPINE BESYLATE 5 MG/1
TABLET ORAL
Qty: 90 TABLET | Refills: 3 | Status: SHIPPED | OUTPATIENT
Start: 2023-05-05

## 2023-05-05 RX ORDER — SPIRONOLACTONE AND HYDROCHLOROTHIAZIDE 25; 25 MG/1; MG/1
0.5 TABLET ORAL DAILY
Qty: 45 TABLET | Refills: 3 | Status: SHIPPED | OUTPATIENT
Start: 2023-05-05

## 2023-05-09 ENCOUNTER — TELEPHONE (OUTPATIENT)
Dept: CARDIOLOGY CLINIC | Age: 73
End: 2023-05-09

## 2023-05-25 ENCOUNTER — TELEPHONE (OUTPATIENT)
Age: 73
End: 2023-05-25

## 2023-05-29 NOTE — TELEPHONE ENCOUNTER
Continue current meds as she is taking, stay off Aldactazide  Minimize sodium and try to walk daily for exercise  Check a basic metabolic panel on Tuesday or Wednesday of this week just to make sure creatinine and potassium are okay since she increased losartan

## 2023-05-30 NOTE — TELEPHONE ENCOUNTER
Triage informed pt of Dr. Jair Andrdae' response. She verbalizes understanding and agrees to plan. States she will have labs drawn. Pt states she is having a bit of left sided chest pain today but she was bagging her recycling, tripped, and caught herself with her left arm. Denies SOB or any other symptoms. States it is getting better with some movement this morning. Triage informed pt: pain sounds more musculoskeletal than cardiac in nature. Recommended pt try tylenol or occasional ibuprofen for discomfort and if needed, call our office back. Pt verbalizes understanding and agrees to plan.

## 2023-06-01 DIAGNOSIS — I10 HYPERTENSION, UNCONTROLLED: ICD-10-CM

## 2023-06-01 LAB
ANION GAP SERPL CALC-SCNC: 9 MMOL/L (ref 2–11)
BUN SERPL-MCNC: 19 MG/DL (ref 8–23)
CALCIUM SERPL-MCNC: 9.6 MG/DL (ref 8.3–10.4)
CHLORIDE SERPL-SCNC: 99 MMOL/L (ref 101–110)
CO2 SERPL-SCNC: 27 MMOL/L (ref 21–32)
CREAT SERPL-MCNC: 1.2 MG/DL (ref 0.6–1)
GLUCOSE SERPL-MCNC: 261 MG/DL (ref 65–100)
POTASSIUM SERPL-SCNC: 4.7 MMOL/L (ref 3.5–5.1)
SODIUM SERPL-SCNC: 135 MMOL/L (ref 133–143)

## 2023-06-02 ENCOUNTER — TELEPHONE (OUTPATIENT)
Age: 73
End: 2023-06-02

## 2023-06-02 NOTE — TELEPHONE ENCOUNTER
----- Message from Jeronimo Rod MD sent at 6/2/2023  5:00 AM EDT -----  No major abnormalities. Continue current meds without change.

## 2023-06-02 NOTE — TELEPHONE ENCOUNTER
No major abnormalities. Continue current meds without change per Dr. Anish Buitrago. Pt verbalized understanding. Kevin De Jesus

## 2023-06-22 ENCOUNTER — TELEPHONE (OUTPATIENT)
Dept: CARDIOLOGY | Age: 73
End: 2023-06-22

## 2023-06-22 NOTE — TELEPHONE ENCOUNTER
Patient called on call service this morning reporting that her BP is elevated. Reports that she has been check ing her BP every 10 minutes since taking medications around 0415. Reviewed BP medications with patient and she has only taken her losartan dose (100mg) this morning. Plan:  -- take her other BP medications  -- recheck her BP mid morning. Review with patient that taking her BP every 10 minutes can actually cause her BP to remain elevated. Call the office for further instructions if her BP remains elevated after taking the rest of her BP medications. Patient verbalized understanding of plan. Reviewed low salt diet with patient and she stated that there was no change in her diet. Uses sea salt and not iodine salt.  Admits to eating out \"all the time\"    SUZANNE Rutherford - ASHLEY  6:21 AM

## 2023-07-25 ENCOUNTER — TELEPHONE (OUTPATIENT)
Dept: FAMILY MEDICINE CLINIC | Facility: CLINIC | Age: 73
End: 2023-07-25

## 2023-07-25 DIAGNOSIS — E03.9 PRIMARY HYPOTHYROIDISM: ICD-10-CM

## 2023-07-25 DIAGNOSIS — I48.0 PAROXYSMAL ATRIAL FIBRILLATION (HCC): ICD-10-CM

## 2023-07-25 NOTE — TELEPHONE ENCOUNTER
----- Message from [employer] sent at 7/25/2023 11:28 AM EDT -----  Subject: Refill Request    QUESTIONS  Name of Medication? insulin glargine (LANTUS;BASAGLAR) 100 UNIT/ML   injection pen  Patient-reported dosage and instructions? as directed  How many days do you have left? 5  Preferred Pharmacy? [facility] phone number (if available)? [phone]  ---------------------------------------------------------------------------  --------------,  Name of Medication? thyroid (ARMOUR) 30 MG tablet  Patient-reported dosage and instructions? as directed  How many days do you have left? 5  Preferred Pharmacy? [facility] phone number (if available)? [phone]  ---------------------------------------------------------------------------  --------------,  Name of Medication? apixaban (ELIQUIS) 5 MG TABS tablet  Patient-reported dosage and instructions? as directed  How many days do you have left? 5  Preferred Pharmacy? [facility] phone number (if available)? [phone]  ---------------------------------------------------------------------------  --------------,  Name of Medication? losartan (COZAAR) 100 MG tablet  Patient-reported dosage and instructions? as directed  How many days do you have left? 5  Preferred Pharmacy? [facility] phone number (if available)? [phone]  ---------------------------------------------------------------------------  --------------,  Name of Medication? colestipol (COLESTID) 1 g tablet  Patient-reported dosage and instructions? as diorected   How many days do you have left? 5  Preferred Pharmacy? [facility] phone number (if available)? [phone]  ---------------------------------------------------------------------------  --------------,  Name of Medication? amLODIPine (NORVASC) 5 MG tablet  Patient-reported dosage and instructions?  as

## 2023-07-25 NOTE — TELEPHONE ENCOUNTER
Can you please call this patient and see if she has established elsewhere and if not, she will need to do that, we can see if someone here can send in meds.

## 2023-07-26 NOTE — TELEPHONE ENCOUNTER
Patient is going to call [facility] internal to get a new patient appointment set up. Can you send in a refill for her?

## 2023-07-26 NOTE — TELEPHONE ENCOUNTER
Informed pt that she should have enough to last her until the beginning of the year. Pt states that she will call [facility]. If she has any problems she will let me know.

## 2023-07-28 DIAGNOSIS — R00.1 SYMPTOMATIC BRADYCARDIA: Primary | ICD-10-CM

## 2023-07-28 DIAGNOSIS — I48.0 PAROXYSMAL ATRIAL FIBRILLATION (HCC): ICD-10-CM

## 2023-07-28 DIAGNOSIS — Z95.0 PACEMAKER: ICD-10-CM

## 2023-08-01 DIAGNOSIS — Z95.0 PACEMAKER: ICD-10-CM

## 2023-08-01 DIAGNOSIS — R00.1 SYMPTOMATIC BRADYCARDIA: ICD-10-CM

## 2023-08-08 PROCEDURE — 93294 REM INTERROG EVL PM/LDLS PM: CPT | Performed by: INTERNAL MEDICINE

## 2023-08-08 PROCEDURE — 93296 REM INTERROG EVL PM/IDS: CPT | Performed by: INTERNAL MEDICINE

## 2023-09-01 ENCOUNTER — HOSPITAL ENCOUNTER (EMERGENCY)
Age: 73
Discharge: HOME | End: 2023-09-02
Attending: EMERGENCY MEDICINE
Payer: MEDICARE

## 2023-09-01 ENCOUNTER — APPOINTMENT (OUTPATIENT)
Dept: GENERAL RADIOLOGY | Age: 73
End: 2023-09-01
Payer: MEDICARE

## 2023-09-01 DIAGNOSIS — R07.9 CHEST PAIN, UNSPECIFIED TYPE: Primary | ICD-10-CM

## 2023-09-01 DIAGNOSIS — I10 HYPERTENSION, UNCONTROLLED: ICD-10-CM

## 2023-09-01 LAB
ANION GAP SERPL CALC-SCNC: 6 MMOL/L (ref 2–11)
BASOPHILS # BLD: 0.1 K/UL (ref 0–0.2)
BASOPHILS NFR BLD: 1 % (ref 0–2)
BUN SERPL-MCNC: 19 MG/DL (ref 8–23)
CALCIUM SERPL-MCNC: 9.2 MG/DL (ref 8.3–10.4)
CHLORIDE SERPL-SCNC: 105 MMOL/L (ref 101–110)
CO2 SERPL-SCNC: 25 MMOL/L (ref 21–32)
CREAT SERPL-MCNC: 1.3 MG/DL (ref 0.6–1)
D DIMER PPP FEU-MCNC: 0.29 UG/ML(FEU)
DIFFERENTIAL METHOD BLD: NORMAL
EOSINOPHIL # BLD: 0.2 K/UL (ref 0–0.8)
EOSINOPHIL NFR BLD: 3 % (ref 0.5–7.8)
ERYTHROCYTE [DISTWIDTH] IN BLOOD BY AUTOMATED COUNT: 12.5 % (ref 11.9–14.6)
GLUCOSE SERPL-MCNC: 256 MG/DL (ref 65–100)
HCT VFR BLD AUTO: 40.1 % (ref 35.8–46.3)
HGB BLD-MCNC: 13.5 G/DL (ref 11.7–15.4)
IMM GRANULOCYTES # BLD AUTO: 0 K/UL (ref 0–0.5)
IMM GRANULOCYTES NFR BLD AUTO: 0 % (ref 0–5)
LYMPHOCYTES # BLD: 2.2 K/UL (ref 0.5–4.6)
LYMPHOCYTES NFR BLD: 28 % (ref 13–44)
MCH RBC QN AUTO: 29 PG (ref 26.1–32.9)
MCHC RBC AUTO-ENTMCNC: 33.7 G/DL (ref 31.4–35)
MCV RBC AUTO: 86.1 FL (ref 82–102)
MONOCYTES # BLD: 0.4 K/UL (ref 0.1–1.3)
MONOCYTES NFR BLD: 5 % (ref 4–12)
NEUTS SEG # BLD: 4.9 K/UL (ref 1.7–8.2)
NEUTS SEG NFR BLD: 63 % (ref 43–78)
NRBC # BLD: 0 K/UL (ref 0–0.2)
PLATELET # BLD AUTO: 286 K/UL (ref 150–450)
PMV BLD AUTO: 9.8 FL (ref 9.4–12.3)
POTASSIUM SERPL-SCNC: 3.9 MMOL/L (ref 3.5–5.1)
RBC # BLD AUTO: 4.66 M/UL (ref 4.05–5.2)
SODIUM SERPL-SCNC: 136 MMOL/L (ref 133–143)
TROPONIN I SERPL HS-MCNC: 5.5 PG/ML (ref 0–14)
WBC # BLD AUTO: 7.8 K/UL (ref 4.3–11.1)

## 2023-09-01 PROCEDURE — 84484 ASSAY OF TROPONIN QUANT: CPT

## 2023-09-01 PROCEDURE — 85379 FIBRIN DEGRADATION QUANT: CPT

## 2023-09-01 PROCEDURE — 6370000000 HC RX 637 (ALT 250 FOR IP): Performed by: EMERGENCY MEDICINE

## 2023-09-01 PROCEDURE — 71046 X-RAY EXAM CHEST 2 VIEWS: CPT

## 2023-09-01 PROCEDURE — 93005 ELECTROCARDIOGRAM TRACING: CPT | Performed by: EMERGENCY MEDICINE

## 2023-09-01 PROCEDURE — 99285 EMERGENCY DEPT VISIT HI MDM: CPT

## 2023-09-01 PROCEDURE — 80048 BASIC METABOLIC PNL TOTAL CA: CPT

## 2023-09-01 PROCEDURE — 85025 COMPLETE CBC W/AUTO DIFF WBC: CPT

## 2023-09-01 RX ORDER — 0.9 % SODIUM CHLORIDE 0.9 %
500 INTRAVENOUS SOLUTION INTRAVENOUS ONCE
Status: COMPLETED | OUTPATIENT
Start: 2023-09-02 | End: 2023-09-02

## 2023-09-01 RX ADMIN — METOPROLOL TARTRATE 25 MG: 25 TABLET, FILM COATED ORAL at 23:59

## 2023-09-01 ASSESSMENT — PAIN - FUNCTIONAL ASSESSMENT: PAIN_FUNCTIONAL_ASSESSMENT: NONE - DENIES PAIN

## 2023-09-01 ASSESSMENT — LIFESTYLE VARIABLES
HOW OFTEN DO YOU HAVE A DRINK CONTAINING ALCOHOL: NEVER
HOW MANY STANDARD DRINKS CONTAINING ALCOHOL DO YOU HAVE ON A TYPICAL DAY: PATIENT DOES NOT DRINK

## 2023-09-02 VITALS
HEART RATE: 76 BPM | DIASTOLIC BLOOD PRESSURE: 74 MMHG | RESPIRATION RATE: 16 BRPM | HEIGHT: 61 IN | WEIGHT: 132 LBS | TEMPERATURE: 99 F | BODY MASS INDEX: 24.92 KG/M2 | OXYGEN SATURATION: 96 % | SYSTOLIC BLOOD PRESSURE: 166 MMHG

## 2023-09-02 LAB
EKG ATRIAL RATE: 80 BPM
EKG DIAGNOSIS: NORMAL
EKG P AXIS: 48 DEGREES
EKG P-R INTERVAL: 165 MS
EKG Q-T INTERVAL: 360 MS
EKG QRS DURATION: 87 MS
EKG QTC CALCULATION (BAZETT): 418 MS
EKG R AXIS: 32 DEGREES
EKG T AXIS: -69 DEGREES
EKG VENTRICULAR RATE: 81 BPM
TROPONIN I SERPL HS-MCNC: 7.9 PG/ML (ref 0–14)

## 2023-09-02 PROCEDURE — 93010 ELECTROCARDIOGRAM REPORT: CPT | Performed by: INTERNAL MEDICINE

## 2023-09-02 PROCEDURE — 2580000003 HC RX 258: Performed by: EMERGENCY MEDICINE

## 2023-09-02 RX ORDER — NITROGLYCERIN 0.4 MG/1
0.4 TABLET SUBLINGUAL EVERY 5 MIN PRN
Qty: 25 TABLET | Refills: 3 | Status: SHIPPED | OUTPATIENT
Start: 2023-09-02

## 2023-09-02 RX ADMIN — SODIUM CHLORIDE 500 ML: 9 INJECTION, SOLUTION INTRAVENOUS at 00:01

## 2023-09-02 NOTE — ED PROVIDER NOTES
Emergency Department Provider Note       PCP: Princess Mckeon MD   Age: 75 y.o. Sex: female     DISPOSITION Decision To Discharge 09/02/2023 12:50:06 AM       ICD-10-CM    1. Chest pain, unspecified type  R07.9       2. Hypertension, uncontrolled  I10           Medical Decision Making     Complexity of Problems Addressed:  1 or more acute illnesses that pose a threat to life or bodily function. Data Reviewed and Analyzed:   I independently ordered and reviewed each unique test.  I reviewed external records: provider visit note from outside specialist. Cards bradycardia with syncope->PM, afib, nl echo 2021      I independently ordered and interpreted the ED EKG in the absence of a Cardiologist.    Rate: 80  EKG Interpretation: EKG Interpretation: sinus rhythm, no evidence of arrhythmia  ST Segments: Nonspecific ST segments - NO STEMI  Unchanged from previous      Discussion of management or test interpretation. Patient's device was interrogated without any events per rep. Troponins x2 were negative. Her D-dimer is negative. Chest x-ray negative. She was given a dose of metoprolol 25 here. I discussed with her at length that if her blood pressure at home is above 160 she should take a dose of metoprolol. She needs to discuss whether or not she needs to stay on that medication with Dr. Lopez Chambers. A note was sent to Dr. Vamsi Delgado office and he was included and I also recommended following up with her about how to dose her metoprolol. She was given nitroglycerin prescription as well. Patient was encouraged to return if symptoms worsened or changed. Risk of Complications and/or Morbidity of Patient Management:  Prescription drug management performed. Is this patient to be included in the SEP-1 core measure due to severe sepsis or septic shock? No Exclusion criteria - the patient is NOT to be included for SEP-1 Core Measure due to: Infection is not suspected      History      Rashaad Kingsley is a 75 y.o. in the mornign; increase 2 units every 3 days until blood sugar below 140. Maximum 50 units per day, Disp-5 Adjustable Dose Pre-filled Pen Syringe, R-5Normal      thyroid (ARMOUR) 30 MG tablet Take 1 tablet by mouth daily, Disp-90 tablet, R-3Print      ULTICARE MICRO PEN NEEDLES 32G X 4 MM MISC GAIL, Historical Med      ZINC SULFATE ER PO Take by mouth dailyHistorical Med      acetaminophen (TYLENOL) 325 MG tablet Take 2 tablets by mouth every 4 hours as neededHistorical Med      ascorbic acid (VITAMIN C) 500 MG tablet Take by mouth dailyHistorical Med      fluticasone (FLONASE) 50 MCG/ACT nasal spray 2 sprays by Nasal route as neededHistorical Med              Results for orders placed or performed during the hospital encounter of 09/01/23   XR CHEST (2 VW)    Narrative    Examination: PA and lateral views of the chest    Indication: Pain    Comparison: 3/8/2022    Findings: The cardiomediastinal silhouette is within normal size limits. A dual-lead   pacing device is present in the left chest wall. There is no consolidative airspace disease, pleural effusion or pulmonary edema. There is no pneumothorax. No acute osseous abnormality is identified. A midthoracic compression deformity   appears unchanged. Impression    Impression:  No evidence of an acute pleural or pulmonary parenchymal abnormality.      Nathalie Garcia M.D.   9/1/2023 10:59:00 PM   Basic Metabolic Panel   Result Value Ref Range    Sodium 136 133 - 143 mmol/L    Potassium 3.9 3.5 - 5.1 mmol/L    Chloride 105 101 - 110 mmol/L    CO2 25 21 - 32 mmol/L    Anion Gap 6 2 - 11 mmol/L    Glucose 256 (H) 65 - 100 mg/dL    BUN 19 8 - 23 MG/DL    Creatinine 1.30 (H) 0.6 - 1.0 MG/DL    Est, Glom Filt Rate 43 (L) >60 ml/min/1.73m2    Calcium 9.2 8.3 - 10.4 MG/DL   CBC with Auto Differential   Result Value Ref Range    WBC 7.8 4.3 - 11.1 K/uL    RBC 4.66 4.05 - 5.2 M/uL    Hemoglobin 13.5 11.7 - 15.4 g/dL    Hematocrit 40.1 35.8 - 46.3 %    MCV 86.1

## 2023-09-02 NOTE — ED TRIAGE NOTES
Chest pain began this morning intermittent.    /120  Received 324 asa and 2 nitro in ambulance blood pressure decreased to 170/113  BSG in ambulance  320  T 98.5    Hx of HTN, Diabetes II, Pacemaker and Afib

## 2023-09-02 NOTE — ED NOTES
[employer] Called to inform no arrhythmias,HR >150, or abnormalities were found on pacemaker.       Navdeep Arnold, ARNOLD  09/01/23 [address], RN  09/01/23 [id number]

## 2023-09-10 ASSESSMENT — ENCOUNTER SYMPTOMS
SORE THROAT: 0
ABDOMINAL DISTENTION: 0
SHORTNESS OF BREATH: 0
CONSTIPATION: 0
ABDOMINAL PAIN: 0
COUGH: 0
PHOTOPHOBIA: 0
DIARRHEA: 0

## 2023-09-10 NOTE — PROGRESS NOTES
[facility] CARDIOLOGY  [address] [address] [facility] [address]ishra  PHONE: [phone]        NAME:  Oliver Musa  : 1950  MRN: [id number]     PCP:  No primary care provider on file. SUBJECTIVE:   Oliver Musa is a 75 y.o. female seen for a follow up visit regarding the following:     Chief Complaint   Patient presents with    Follow-Up from Hospital    Hypertension    Irregular Heart Beat       HPI:    Recently admitted to  [facility] with high-grade AV block and syncope with fall. She had a dual-chamber MRI safe [employer] pacemaker implantation with good result. .. She is doing well from a pacemaker standpoint and had a nuclear stress test recently showing normal perfusion with normal ejection fraction and regional wall motion. Pacemaker site  well-healed, interrogations stable. Her blood pressure is elevated consistently at home and here today despite medication compliance (recently increased losartan to 50 mg twice daily but blood pressure remains elevated). Increased amlodipine to 2.5 mg twice daily with no increase in lower extremity edema. She has whitecoat hypertension here and in all MD offices. Cannot tolerate statins, refuses, has tried numerous statins in the past, all with side effects. Trying to watch diet, walking for exercise without any limitations or exertional symptoms.  earlier this year. Familial most likely. Willing to try Repatha, but insurance would not approve. Taking colestipol now, checking lipids soon with PCP. .. Being evaluated by [facility] neurology and [facility] for bilateral vertebral artery stenosis, currently stable without any recent neurologic symptoms on Eliquis twice daily with good compliance. Seen in the ER 10 days ago with atypical sharp chest pain with negative ECG, negative serial troponins, and presents today for follow-up.   Blood pressure was elevated in the ER as well and she was given

## 2023-09-12 ENCOUNTER — OFFICE VISIT (OUTPATIENT)
Age: 73
End: 2023-09-12
Payer: MEDICARE

## 2023-09-12 VITALS
DIASTOLIC BLOOD PRESSURE: 110 MMHG | BODY MASS INDEX: 24.48 KG/M2 | SYSTOLIC BLOOD PRESSURE: 200 MMHG | HEART RATE: 71 BPM | HEIGHT: 62 IN | WEIGHT: 133 LBS

## 2023-09-12 DIAGNOSIS — E03.9 PRIMARY HYPOTHYROIDISM: ICD-10-CM

## 2023-09-12 DIAGNOSIS — I10 HYPERTENSION, UNCONTROLLED: ICD-10-CM

## 2023-09-12 DIAGNOSIS — R00.1 SYMPTOMATIC BRADYCARDIA: ICD-10-CM

## 2023-09-12 DIAGNOSIS — I48.0 PAROXYSMAL ATRIAL FIBRILLATION (HCC): Primary | ICD-10-CM

## 2023-09-12 DIAGNOSIS — Z95.0 PACEMAKER: ICD-10-CM

## 2023-09-12 DIAGNOSIS — E78.2 MIXED HYPERLIPIDEMIA: ICD-10-CM

## 2023-09-12 PROCEDURE — 3077F SYST BP >= 140 MM HG: CPT | Performed by: INTERNAL MEDICINE

## 2023-09-12 PROCEDURE — G8399 PT W/DXA RESULTS DOCUMENT: HCPCS | Performed by: INTERNAL MEDICINE

## 2023-09-12 PROCEDURE — 1090F PRES/ABSN URINE INCON ASSESS: CPT | Performed by: INTERNAL MEDICINE

## 2023-09-12 PROCEDURE — 3017F COLORECTAL CA SCREEN DOC REV: CPT | Performed by: INTERNAL MEDICINE

## 2023-09-12 PROCEDURE — 93000 ELECTROCARDIOGRAM COMPLETE: CPT | Performed by: INTERNAL MEDICINE

## 2023-09-12 PROCEDURE — G8420 CALC BMI NORM PARAMETERS: HCPCS | Performed by: INTERNAL MEDICINE

## 2023-09-12 PROCEDURE — G8427 DOCREV CUR MEDS BY ELIG CLIN: HCPCS | Performed by: INTERNAL MEDICINE

## 2023-09-12 PROCEDURE — 1123F ACP DISCUSS/DSCN MKR DOCD: CPT | Performed by: INTERNAL MEDICINE

## 2023-09-12 PROCEDURE — 1036F TOBACCO NON-USER: CPT | Performed by: INTERNAL MEDICINE

## 2023-09-12 PROCEDURE — 3079F DIAST BP 80-89 MM HG: CPT | Performed by: INTERNAL MEDICINE

## 2023-09-12 PROCEDURE — 99215 OFFICE O/P EST HI 40 MIN: CPT | Performed by: INTERNAL MEDICINE

## 2023-09-12 RX ORDER — M-VIT,TX,IRON,MINS/CALC/FOLIC 27MG-0.4MG
1 TABLET ORAL DAILY
COMMUNITY

## 2023-09-12 RX ORDER — SPIRONOLACTONE AND HYDROCHLOROTHIAZIDE 25; 25 MG/1; MG/1
1 TABLET ORAL DAILY
Qty: 30 TABLET | Refills: 11 | Status: SHIPPED | OUTPATIENT
Start: 2023-09-12

## 2023-09-12 RX ORDER — AMLODIPINE BESYLATE 10 MG/1
TABLET ORAL
Qty: 90 TABLET | Refills: 3 | Status: SHIPPED | OUTPATIENT
Start: 2023-09-12

## 2023-09-12 RX ORDER — LOSARTAN POTASSIUM 100 MG/1
100 TABLET ORAL DAILY
Qty: 30 TABLET | Refills: 0 | Status: SHIPPED | OUTPATIENT
Start: 2023-09-12

## 2023-09-12 ASSESSMENT — ENCOUNTER SYMPTOMS: CHEST TIGHTNESS: 1

## 2023-09-14 ENCOUNTER — TELEMEDICINE (OUTPATIENT)
Dept: INTERNAL MEDICINE CLINIC | Facility: CLINIC | Age: 73
End: 2023-09-14
Payer: MEDICARE

## 2023-09-14 DIAGNOSIS — E11.65 TYPE 2 DIABETES MELLITUS WITH HYPERGLYCEMIA, WITH LONG-TERM CURRENT USE OF INSULIN (HCC): ICD-10-CM

## 2023-09-14 DIAGNOSIS — R53.1 GENERALIZED WEAKNESS: ICD-10-CM

## 2023-09-14 DIAGNOSIS — E03.9 PRIMARY HYPOTHYROIDISM: ICD-10-CM

## 2023-09-14 DIAGNOSIS — G47.33 OSA (OBSTRUCTIVE SLEEP APNEA): ICD-10-CM

## 2023-09-14 DIAGNOSIS — R25.2 LEG CRAMPING: Primary | ICD-10-CM

## 2023-09-14 DIAGNOSIS — Z86.73 HISTORY OF CVA (CEREBROVASCULAR ACCIDENT): ICD-10-CM

## 2023-09-14 DIAGNOSIS — N17.9 AKI (ACUTE KIDNEY INJURY) (HCC): ICD-10-CM

## 2023-09-14 DIAGNOSIS — I20.9 ANGINA PECTORIS, UNSPECIFIED (HCC): ICD-10-CM

## 2023-09-14 DIAGNOSIS — Z79.4 TYPE 2 DIABETES MELLITUS WITH HYPERGLYCEMIA, WITH LONG-TERM CURRENT USE OF INSULIN (HCC): ICD-10-CM

## 2023-09-14 DIAGNOSIS — F32.1 DEPRESSION, MAJOR, SINGLE EPISODE, MODERATE (HCC): ICD-10-CM

## 2023-09-14 PROBLEM — I25.119 ATHEROSCLEROTIC HEART DISEASE OF NATIVE CORONARY ARTERY WITH UNSPECIFIED ANGINA PECTORIS (HCC): Status: ACTIVE | Noted: 2023-09-14

## 2023-09-14 PROBLEM — N18.30 CHRONIC RENAL DISEASE, STAGE III (HCC): Status: ACTIVE | Noted: 2023-09-14

## 2023-09-14 PROCEDURE — 99423 OL DIG E/M SVC 21+ MIN: CPT | Performed by: STUDENT IN AN ORGANIZED HEALTH CARE EDUCATION/TRAINING PROGRAM

## 2023-09-14 RX ORDER — THYROID 30 MG/1
30 TABLET ORAL DAILY
Qty: 90 TABLET | Refills: 3 | Status: SHIPPED | OUTPATIENT
Start: 2023-09-14

## 2023-09-14 RX ORDER — MV-MN/C/THEANINE/HERB NO.310 1000-200MG
1 POWDER IN PACKET (EA) ORAL DAILY
Qty: 1 CAPSULE | Refills: 0
Start: 2023-09-14

## 2023-09-14 ASSESSMENT — PATIENT HEALTH QUESTIONNAIRE - PHQ9
1. LITTLE INTEREST OR PLEASURE IN DOING THINGS: 0
SUM OF ALL RESPONSES TO PHQ QUESTIONS 1-9: 0
2. FEELING DOWN, DEPRESSED OR HOPELESS: 0
SUM OF ALL RESPONSES TO PHQ QUESTIONS 1-9: 0
SUM OF ALL RESPONSES TO PHQ9 QUESTIONS 1 & 2: 0

## 2023-09-14 NOTE — PROGRESS NOTES
Meño Barragan (:  1950) is seen via virtual visit today for evaluation of the following:   Chief Complaint   Patient presents with    New Patient   . HPI:  Has . Her prior PCP left. Patient requests virtual patient visit today because she does not feel well, I explained limitations of virtual visit to the patient, that I am unable to do in-person physical exam etc., patient voiced understanding. Not feeling well since yesterday. Temp 97.9. No CP, SOB, nasal congestion, diarrhea, vomiting  Hasn't checked herself for COVID (has home tests and will check herself). Seen in ED 2023 for chest pain, blood pressure quite high at 230/120, EKG no acute ischemia, creatinine 1.3 (was 1.2 23 and 1.0 23)    Had leg cramps all night, severe, generalized weakness, headache. She gets leg cramps occasionally, not new. The cramps have resolved, occurs only when she sleeps. Takes calcium, mag supplements every night although sometimes forgets to check which happened like last night. HTN: BP has been elevated, 142/82, 155/90  On losartan 100 mg daily, amlodipine 5 mg daily (has not started 10 mg yet), chlorthalidone 25 mg daily, metoprolol, she hasn't started aldactazide yet but will start after getting labs done. GABRIEL: doesn't wear CPAP, uncomfortable. Last saw sleep 3/2023. Wakes up frequently gasping for air. DM2: blood glucose this , 101, 131. Taking 38 U lantus daily. Random glucose 202. Couldn't tolerate metformin, less energy, dizzy. High Cr: avoids NSAID. Taking tylenol for headache.     Hypothyroid: on armor 30 mg  A fib, SSS s/p pacer: sees cardiology  History of CVA, peripheral neuropathy: sees neuro  HLD: coulnd't tolerate statins, per chart review insurance wouldn't approve repatha    The following portions of the patient's history were reviewed and updated as appropriate:      Patient Active Problem List   Diagnosis    Anxiety    Post-traumatic headache    DM

## 2023-09-15 ENCOUNTER — TELEPHONE (OUTPATIENT)
Dept: INTERNAL MEDICINE CLINIC | Facility: CLINIC | Age: 73
End: 2023-09-15

## 2023-09-15 ENCOUNTER — TELEPHONE (OUTPATIENT)
Age: 73
End: 2023-09-15

## 2023-09-15 NOTE — TELEPHONE ENCOUNTER
Per Sandy Suaerz at [facility], advised patient that 24 hour urine should be good for up to 14 days, if she has kept urine refrigerated. Patient verbalized understanding, and states that urine has remained refrigerated, so she will bring urine to [facility] outpatient lab on Monday.

## 2023-09-15 NOTE — TELEPHONE ENCOUNTER
----- Message from Juan Francisco Huerta sent at 9/14/2023  3:54 PM EDT -----  Subject: Message to Provider    QUESTIONS  Information for Provider? patient was in today and set up care and    wanted her to come back in one month for a follow up. please call to set   something up with her.  ---------------------------------------------------------------------------  --------------  [address]  [phone]; OK to leave message on voicemail  ---------------------------------------------------------------------------  --------------  SCRIPT ANSWERS  Relationship to Patient?  Self

## 2023-09-15 NOTE — TELEPHONE ENCOUNTER
Pt has been ill, did not get to go to lab. Wants to know if she should start collecting urine all over again and if so can she  container in [place]? Also pt has been tracking BP and the last three days are as follows:      9/13    122/72  HR  72  9/14     138/80 HR  80  9/15     117/67  HR 85    Pt states this is highly unusual to have these low of numbers. Never this low. Cramps at night which is normal. Pt will await a call.

## 2023-09-15 NOTE — TELEPHONE ENCOUNTER
Feeling sick for 3 days, feeling very lethargic and fatigued, staying in bed. Feeling better, this afternoon. No fever, URI symptoms, nausea, vomiting, or diarrhea. Poor appetite, eating less than usual.  Has not felt well enough to go to lab for blood work. Collected 24 hour urine Tuesday night through Wednesday night, this week. BP very good, as listed above. Patient asks if she must repeat 24 hour urine, since she has not been able to return urine to lab. Since BP is now okay, she asks if she must complete 24 hour urine.

## 2023-09-22 DIAGNOSIS — N17.9 AKI (ACUTE KIDNEY INJURY) (HCC): ICD-10-CM

## 2023-09-22 DIAGNOSIS — I10 HYPERTENSION, UNCONTROLLED: ICD-10-CM

## 2023-09-22 DIAGNOSIS — R53.1 GENERALIZED WEAKNESS: ICD-10-CM

## 2023-09-22 DIAGNOSIS — R25.2 LEG CRAMPING: ICD-10-CM

## 2023-09-22 DIAGNOSIS — E03.9 PRIMARY HYPOTHYROIDISM: ICD-10-CM

## 2023-09-22 LAB
ALBUMIN SERPL-MCNC: 3.9 G/DL (ref 3.2–4.6)
ALBUMIN/GLOB SERPL: 1.2 (ref 0.4–1.6)
ALP SERPL-CCNC: 62 U/L (ref 50–136)
ALT SERPL-CCNC: 25 U/L (ref 12–65)
ANION GAP SERPL CALC-SCNC: 6 MMOL/L (ref 2–11)
APPEARANCE UR: CLEAR
AST SERPL-CCNC: 17 U/L (ref 15–37)
BACTERIA URNS QL MICRO: NEGATIVE /HPF
BASOPHILS # BLD: 0.1 K/UL (ref 0–0.2)
BASOPHILS NFR BLD: 1 % (ref 0–2)
BILIRUB SERPL-MCNC: 0.3 MG/DL (ref 0.2–1.1)
BILIRUB UR QL: NEGATIVE
BUN SERPL-MCNC: 17 MG/DL (ref 8–23)
CALCIUM SERPL-MCNC: 9.7 MG/DL (ref 8.3–10.4)
CASTS URNS QL MICRO: NORMAL /LPF (ref 0–2)
CHLORIDE SERPL-SCNC: 105 MMOL/L (ref 101–110)
CK SERPL-CCNC: 197 U/L (ref 21–215)
CO2 SERPL-SCNC: 30 MMOL/L (ref 21–32)
COLOR UR: NORMAL
CREAT SERPL-MCNC: 1.2 MG/DL (ref 0.6–1)
DIFFERENTIAL METHOD BLD: NORMAL
EOSINOPHIL # BLD: 0.2 K/UL (ref 0–0.8)
EOSINOPHIL NFR BLD: 2 % (ref 0.5–7.8)
EPI CELLS #/AREA URNS HPF: NORMAL /HPF (ref 0–5)
ERYTHROCYTE [DISTWIDTH] IN BLOOD BY AUTOMATED COUNT: 12.5 % (ref 11.9–14.6)
GLOBULIN SER CALC-MCNC: 3.2 G/DL (ref 2.8–4.5)
GLUCOSE SERPL-MCNC: 173 MG/DL (ref 65–100)
GLUCOSE UR STRIP.AUTO-MCNC: NEGATIVE MG/DL
HCT VFR BLD AUTO: 43.3 % (ref 35.8–46.3)
HGB BLD-MCNC: 14.1 G/DL (ref 11.7–15.4)
HGB UR QL STRIP: NEGATIVE
IMM GRANULOCYTES # BLD AUTO: 0 K/UL (ref 0–0.5)
IMM GRANULOCYTES NFR BLD AUTO: 0 % (ref 0–5)
KETONES UR QL STRIP.AUTO: NEGATIVE MG/DL
LEUKOCYTE ESTERASE UR QL STRIP.AUTO: NEGATIVE
LYMPHOCYTES # BLD: 2.6 K/UL (ref 0.5–4.6)
LYMPHOCYTES NFR BLD: 29 % (ref 13–44)
MAGNESIUM SERPL-MCNC: 1.9 MG/DL (ref 1.8–2.4)
MCH RBC QN AUTO: 28.3 PG (ref 26.1–32.9)
MCHC RBC AUTO-ENTMCNC: 32.6 G/DL (ref 31.4–35)
MCV RBC AUTO: 86.9 FL (ref 82–102)
MONOCYTES # BLD: 0.6 K/UL (ref 0.1–1.3)
MONOCYTES NFR BLD: 6 % (ref 4–12)
MUCOUS THREADS URNS QL MICRO: 0 /LPF
NEUTS SEG # BLD: 5.6 K/UL (ref 1.7–8.2)
NEUTS SEG NFR BLD: 62 % (ref 43–78)
NITRITE UR QL STRIP.AUTO: NEGATIVE
NRBC # BLD: 0 K/UL (ref 0–0.2)
PH UR STRIP: 5.5 (ref 5–9)
PLATELET # BLD AUTO: 356 K/UL (ref 150–450)
PMV BLD AUTO: 10.7 FL (ref 9.4–12.3)
POTASSIUM SERPL-SCNC: 4.1 MMOL/L (ref 3.5–5.1)
PROT SERPL-MCNC: 7.1 G/DL (ref 6.3–8.2)
PROT UR STRIP-MCNC: NEGATIVE MG/DL
RBC # BLD AUTO: 4.98 M/UL (ref 4.05–5.2)
RBC #/AREA URNS HPF: NORMAL /HPF (ref 0–5)
SODIUM SERPL-SCNC: 141 MMOL/L (ref 133–143)
SP GR UR REFRACTOMETRY: 1.01 (ref 1–1.02)
TSH W FREE THYROID IF ABNORMAL: 1.03 UIU/ML (ref 0.36–3.74)
URINE CULTURE IF INDICATED: NORMAL
UROBILINOGEN UR QL STRIP.AUTO: 0.2 EU/DL (ref 0.2–1)
WBC # BLD AUTO: 9.2 K/UL (ref 4.3–11.1)
WBC URNS QL MICRO: NORMAL /HPF (ref 0–4)

## 2023-09-24 ASSESSMENT — ENCOUNTER SYMPTOMS
SHORTNESS OF BREATH: 0
COUGH: 0
DIARRHEA: 0
SORE THROAT: 0
ABDOMINAL PAIN: 0
PHOTOPHOBIA: 0
CONSTIPATION: 0
ABDOMINAL DISTENTION: 0

## 2023-09-25 ENCOUNTER — OFFICE VISIT (OUTPATIENT)
Age: 73
End: 2023-09-25
Payer: MEDICARE

## 2023-09-25 VITALS
DIASTOLIC BLOOD PRESSURE: 78 MMHG | BODY MASS INDEX: 24.38 KG/M2 | HEART RATE: 81 BPM | HEIGHT: 62 IN | SYSTOLIC BLOOD PRESSURE: 138 MMHG | WEIGHT: 132.5 LBS

## 2023-09-25 DIAGNOSIS — Z79.4 TYPE 2 DIABETES MELLITUS WITH HYPERGLYCEMIA, WITH LONG-TERM CURRENT USE OF INSULIN (HCC): ICD-10-CM

## 2023-09-25 DIAGNOSIS — I10 HYPERTENSION, UNCONTROLLED: ICD-10-CM

## 2023-09-25 DIAGNOSIS — R00.1 SYMPTOMATIC BRADYCARDIA: ICD-10-CM

## 2023-09-25 DIAGNOSIS — I48.0 PAROXYSMAL ATRIAL FIBRILLATION (HCC): Primary | ICD-10-CM

## 2023-09-25 DIAGNOSIS — G47.33 OSA (OBSTRUCTIVE SLEEP APNEA): ICD-10-CM

## 2023-09-25 DIAGNOSIS — E11.65 TYPE 2 DIABETES MELLITUS WITH HYPERGLYCEMIA, WITH LONG-TERM CURRENT USE OF INSULIN (HCC): ICD-10-CM

## 2023-09-25 DIAGNOSIS — Z95.0 PACEMAKER: ICD-10-CM

## 2023-09-25 DIAGNOSIS — E03.9 PRIMARY HYPOTHYROIDISM: ICD-10-CM

## 2023-09-25 DIAGNOSIS — E78.2 MIXED HYPERLIPIDEMIA: ICD-10-CM

## 2023-09-25 PROCEDURE — 3052F HG A1C>EQUAL 8.0%<EQUAL 9.0%: CPT | Performed by: INTERNAL MEDICINE

## 2023-09-25 PROCEDURE — G8427 DOCREV CUR MEDS BY ELIG CLIN: HCPCS | Performed by: INTERNAL MEDICINE

## 2023-09-25 PROCEDURE — 1036F TOBACCO NON-USER: CPT | Performed by: INTERNAL MEDICINE

## 2023-09-25 PROCEDURE — 3075F SYST BP GE 130 - 139MM HG: CPT | Performed by: INTERNAL MEDICINE

## 2023-09-25 PROCEDURE — 99214 OFFICE O/P EST MOD 30 MIN: CPT | Performed by: INTERNAL MEDICINE

## 2023-09-25 PROCEDURE — 3078F DIAST BP <80 MM HG: CPT | Performed by: INTERNAL MEDICINE

## 2023-09-25 PROCEDURE — 3017F COLORECTAL CA SCREEN DOC REV: CPT | Performed by: INTERNAL MEDICINE

## 2023-09-25 PROCEDURE — 2022F DILAT RTA XM EVC RTNOPTHY: CPT | Performed by: INTERNAL MEDICINE

## 2023-09-25 PROCEDURE — G8399 PT W/DXA RESULTS DOCUMENT: HCPCS | Performed by: INTERNAL MEDICINE

## 2023-09-25 PROCEDURE — 93000 ELECTROCARDIOGRAM COMPLETE: CPT | Performed by: INTERNAL MEDICINE

## 2023-09-25 PROCEDURE — 1123F ACP DISCUSS/DSCN MKR DOCD: CPT | Performed by: INTERNAL MEDICINE

## 2023-09-25 PROCEDURE — 1090F PRES/ABSN URINE INCON ASSESS: CPT | Performed by: INTERNAL MEDICINE

## 2023-09-25 PROCEDURE — G8420 CALC BMI NORM PARAMETERS: HCPCS | Performed by: INTERNAL MEDICINE

## 2023-09-25 RX ORDER — AMLODIPINE BESYLATE 10 MG/1
TABLET ORAL
Qty: 90 TABLET | Refills: 3 | Status: SHIPPED | OUTPATIENT
Start: 2023-09-25

## 2023-09-25 ASSESSMENT — ENCOUNTER SYMPTOMS: CHEST TIGHTNESS: 0

## 2023-09-25 NOTE — PROGRESS NOTES
[facility] CARDIOLOGY  [id number] [place] [address], [facility]  PHONE: [phone]        NAME:  Lupe Santana  : 1950  MRN: [id number]     PCP:  Susi Davis MD      SUBJECTIVE:   Lupe Santana is a 75 y.o. female seen for a follow up visit regarding the following:     Chief Complaint   Patient presents with    Atrial Fibrillation    2 Week Follow-Up       HPI:    Recently admitted to [facility] with high-grade AV block and syncope with fall. She had a dual-chamber MRI safe Medtronic pacemaker implantation with good result. .. She is doing well from a pacemaker standpoint and had a nuclear stress test recently showing normal perfusion with normal ejection fraction and regional wall motion. Pacemaker site  well-healed, interrogations stable. Her blood pressure is elevated consistently at home and here today despite medication compliance (recently increased losartan to 50 mg twice daily but blood pressure remains elevated). Increased amlodipine to 10mg daily with no increase in lower extremity edema and drop in systolics to 484'V now. She has whitecoat hypertension here and in all MD offices. Cannot tolerate statins, refuses, has tried numerous statins in the past, all with side effects. Trying to watch diet, walking for exercise without any limitations or exertional symptoms.  earlier this year. Familial most likely. Willing to try Repatha, but insurance would not approve. Taking colestipol now, checking lipids soon with PCP. .. Being evaluated by [facility] neurology and [facility] for bilateral vertebral artery stenosis, currently stable without any recent neurologic symptoms on Eliquis twice daily with good compliance. Seen in the ER 10 days prior to our last visit with atypical sharp chest pain with negative ECG, negative serial troponins, and presents today for follow-up.   Blood pressure was elevated in the ER as well and she

## 2023-09-28 LAB
COLLECT DURATION TIME UR: 24 HR
DOPAMINE 24H UR-MRATE: 40 UG/24 HR (ref 0–510)
DOPAMINE UR-MCNC: 67 UG/L
EPINEPH 24H UR-MRATE: 1 UG/24 HR (ref 0–20)
EPINEPH UR-MCNC: 2 UG/L
NOREPINEPH 24H UR-MRATE: 21 UG/24 HR (ref 0–135)
NOREPINEPH UR-MCNC: 35 UG/L
SPECIMEN VOL ?TM UR: 600 ML
VMA 24H UR-MRATE: 1.1 MG/24 HR (ref 0–7.5)
VMA UR-MCNC: 1.9 MG/L

## 2023-09-29 LAB
COLLECT DURATION TIME UR: 24 HR
METANEPH 24H UR-MRATE: 23 UG/24 HR (ref 36–209)
METANEPHS 24H UR-MCNC: 39 UG/L
NORMETANEPHRINE 24H UR-MCNC: 169 UG/L
NORMETANEPHRINE 24H UR-MRATE: 101 UG/24 HR (ref 131–612)
SPECIMEN VOL ?TM UR: 600 ML

## 2023-10-09 ENCOUNTER — HOSPITAL ENCOUNTER (OUTPATIENT)
Dept: ULTRASOUND IMAGING | Age: 73
Discharge: HOME | End: 2023-10-12
Attending: INTERNAL MEDICINE
Payer: MEDICARE

## 2023-10-09 ENCOUNTER — HOSPITAL ENCOUNTER (OUTPATIENT)
Dept: NON INVASIVE DIAGNOSTICS | Age: 73
Discharge: HOME | End: 2023-10-11
Attending: INTERNAL MEDICINE
Payer: MEDICARE

## 2023-10-09 DIAGNOSIS — E78.2 MIXED HYPERLIPIDEMIA: ICD-10-CM

## 2023-10-09 DIAGNOSIS — I48.0 PAROXYSMAL ATRIAL FIBRILLATION (HCC): ICD-10-CM

## 2023-10-09 DIAGNOSIS — Z95.0 PACEMAKER: ICD-10-CM

## 2023-10-09 DIAGNOSIS — I10 HYPERTENSION, UNCONTROLLED: ICD-10-CM

## 2023-10-09 DIAGNOSIS — R00.1 SYMPTOMATIC BRADYCARDIA: ICD-10-CM

## 2023-10-09 LAB
ECHO AO ASC DIAM: 3.3 CM
ECHO AO ROOT DIAM: 2.5 CM
ECHO AV AREA PEAK VELOCITY: 2.3 CM2
ECHO AV AREA VTI: 2.1 CM2
ECHO AV MEAN GRADIENT: 10 MMHG
ECHO AV MEAN VELOCITY: 1.5 M/S
ECHO AV PEAK GRADIENT: 17 MMHG
ECHO AV PEAK VELOCITY: 2.1 M/S
ECHO AV VELOCITY RATIO: 0.71
ECHO AV VTI: 42.4 CM
ECHO EST RA PRESSURE: 3 MMHG
ECHO IVC PROX: 1.2 CM
ECHO LA AREA 2C: 16.9 CM2
ECHO LA AREA 4C: 14.6 CM2
ECHO LA DIAMETER: 3.4 CM
ECHO LA MAJOR AXIS: 4.6 CM
ECHO LA MINOR AXIS: 5.2 CM
ECHO LA TO AORTIC ROOT RATIO: 1.36
ECHO LA VOL 2C: 45 ML (ref 22–52)
ECHO LA VOL 4C: 38 ML (ref 22–52)
ECHO LA VOL BP: 43 ML (ref 22–52)
ECHO LV E' LATERAL VELOCITY: 5 CM/S
ECHO LV E' SEPTAL VELOCITY: 6 CM/S
ECHO LV EDV A2C: 56 ML
ECHO LV EDV A4C: 52 ML
ECHO LV EJECTION FRACTION A2C: 69 %
ECHO LV EJECTION FRACTION A4C: 67 %
ECHO LV EJECTION FRACTION BIPLANE: 67 % (ref 55–100)
ECHO LV ESV A2C: 17 ML
ECHO LV ESV A4C: 17 ML
ECHO LV FRACTIONAL SHORTENING: 42 % (ref 28–44)
ECHO LV INTERNAL DIMENSION DIASTOLIC: 2.6 CM (ref 3.9–5.3)
ECHO LV INTERNAL DIMENSION SYSTOLIC: 1.5 CM
ECHO LV IVSD: 1.1 CM (ref 0.6–0.9)
ECHO LV MASS 2D: 78 G (ref 67–162)
ECHO LV POSTERIOR WALL DIASTOLIC: 1.1 CM (ref 0.6–0.9)
ECHO LV RELATIVE WALL THICKNESS RATIO: 0.85
ECHO LVOT AREA: 3.1 CM2
ECHO LVOT AV VTI INDEX: 0.66
ECHO LVOT DIAM: 2 CM
ECHO LVOT MEAN GRADIENT: 5 MMHG
ECHO LVOT PEAK GRADIENT: 9 MMHG
ECHO LVOT PEAK VELOCITY: 1.5 M/S
ECHO LVOT SV: 87.6 ML
ECHO LVOT VTI: 27.9 CM
ECHO MV A VELOCITY: 1.75 M/S
ECHO MV AREA VTI: 2.6 CM2
ECHO MV E DECELERATION TIME (DT): 277 MS
ECHO MV E VELOCITY: 1.02 M/S
ECHO MV E/A RATIO: 0.58
ECHO MV E/E' LATERAL: 20.4
ECHO MV E/E' RATIO (AVERAGED): 18.7
ECHO MV E/E' SEPTAL: 17
ECHO MV LVOT VTI INDEX: 1.23
ECHO MV MAX VELOCITY: 1.9 M/S
ECHO MV MEAN GRADIENT: 6 MMHG
ECHO MV MEAN VELOCITY: 1.1 M/S
ECHO MV PEAK GRADIENT: 14 MMHG
ECHO MV VTI: 34.2 CM
ECHO PULMONARY ARTERY END DIASTOLIC PRESSURE: 5 MMHG
ECHO PV ACCELERATION TIME (AT): 132 MS
ECHO PV MAX VELOCITY: 1.3 M/S
ECHO PV PEAK GRADIENT: 7 MMHG
ECHO PV REGURGITANT MAX VELOCITY: 1.1 M/S
ECHO RIGHT VENTRICULAR SYSTOLIC PRESSURE (RVSP): 25 MMHG
ECHO RV BASAL DIMENSION: 2.3 CM
ECHO RV FREE WALL PEAK S': 19 CM/S
ECHO RV INTERNAL DIMENSION: 2.7 CM
ECHO RV TAPSE: 2.1 CM (ref 1.7–?)
ECHO TV REGURGITANT MAX VELOCITY: 2.32 M/S
ECHO TV REGURGITANT PEAK GRADIENT: 22 MMHG

## 2023-10-09 PROCEDURE — 93975 VASCULAR STUDY: CPT

## 2023-10-09 PROCEDURE — 93306 TTE W/DOPPLER COMPLETE: CPT

## 2023-10-09 PROCEDURE — 93306 TTE W/DOPPLER COMPLETE: CPT | Performed by: INTERNAL MEDICINE

## 2023-10-18 ENCOUNTER — TELEPHONE (OUTPATIENT)
Age: 73
End: 2023-10-18

## 2023-10-18 NOTE — TELEPHONE ENCOUNTER
Fax from [facility]. Pt was seen today. /93, HR 77.  Cgh  Called pt re BP. Pt states has white coat syndrome. BP has been consistently wnl at home, the best its ever been. Yesterday 118/72, but recently ~ 133/79. Pt keeping VS log daily and will bring to 11/7/23 [facility] FU. Pt will return call prn and for BP elevated 2-3 days in a row.  cgh

## 2023-10-23 ENCOUNTER — OFFICE VISIT (OUTPATIENT)
Dept: INTERNAL MEDICINE CLINIC | Facility: CLINIC | Age: 73
End: 2023-10-23
Payer: MEDICARE

## 2023-10-23 VITALS
WEIGHT: 135 LBS | SYSTOLIC BLOOD PRESSURE: 162 MMHG | HEIGHT: 62 IN | OXYGEN SATURATION: 95 % | HEART RATE: 74 BPM | DIASTOLIC BLOOD PRESSURE: 80 MMHG | BODY MASS INDEX: 24.84 KG/M2

## 2023-10-23 DIAGNOSIS — Z79.4 TYPE 2 DIABETES MELLITUS WITH HYPERGLYCEMIA, WITH LONG-TERM CURRENT USE OF INSULIN (HCC): Primary | ICD-10-CM

## 2023-10-23 DIAGNOSIS — Z79.4 TYPE 2 DIABETES MELLITUS WITH HYPERGLYCEMIA, WITH LONG-TERM CURRENT USE OF INSULIN (HCC): ICD-10-CM

## 2023-10-23 DIAGNOSIS — G31.84 MILD COGNITIVE IMPAIRMENT: ICD-10-CM

## 2023-10-23 DIAGNOSIS — I10 HYPERTENSION, UNCONTROLLED: Primary | ICD-10-CM

## 2023-10-23 DIAGNOSIS — N18.31 STAGE 3A CHRONIC KIDNEY DISEASE (HCC): ICD-10-CM

## 2023-10-23 DIAGNOSIS — E78.2 MIXED HYPERLIPIDEMIA: ICD-10-CM

## 2023-10-23 DIAGNOSIS — E11.65 TYPE 2 DIABETES MELLITUS WITH HYPERGLYCEMIA, WITH LONG-TERM CURRENT USE OF INSULIN (HCC): ICD-10-CM

## 2023-10-23 DIAGNOSIS — E11.65 TYPE 2 DIABETES MELLITUS WITH HYPERGLYCEMIA, WITH LONG-TERM CURRENT USE OF INSULIN (HCC): Primary | ICD-10-CM

## 2023-10-23 DIAGNOSIS — G47.33 OSA (OBSTRUCTIVE SLEEP APNEA): ICD-10-CM

## 2023-10-23 DIAGNOSIS — Z86.73 HISTORY OF CVA (CEREBROVASCULAR ACCIDENT): ICD-10-CM

## 2023-10-23 PROBLEM — E11.9 CONTROLLED TYPE 2 DIABETES MELLITUS WITHOUT COMPLICATION, WITHOUT LONG-TERM CURRENT USE OF INSULIN (HCC): Status: RESOLVED | Noted: 2018-06-14 | Resolved: 2023-10-23

## 2023-10-23 PROCEDURE — G8427 DOCREV CUR MEDS BY ELIG CLIN: HCPCS | Performed by: STUDENT IN AN ORGANIZED HEALTH CARE EDUCATION/TRAINING PROGRAM

## 2023-10-23 PROCEDURE — 3046F HEMOGLOBIN A1C LEVEL >9.0%: CPT | Performed by: STUDENT IN AN ORGANIZED HEALTH CARE EDUCATION/TRAINING PROGRAM

## 2023-10-23 PROCEDURE — 3077F SYST BP >= 140 MM HG: CPT | Performed by: STUDENT IN AN ORGANIZED HEALTH CARE EDUCATION/TRAINING PROGRAM

## 2023-10-23 PROCEDURE — 3017F COLORECTAL CA SCREEN DOC REV: CPT | Performed by: STUDENT IN AN ORGANIZED HEALTH CARE EDUCATION/TRAINING PROGRAM

## 2023-10-23 PROCEDURE — G8420 CALC BMI NORM PARAMETERS: HCPCS | Performed by: STUDENT IN AN ORGANIZED HEALTH CARE EDUCATION/TRAINING PROGRAM

## 2023-10-23 PROCEDURE — 1090F PRES/ABSN URINE INCON ASSESS: CPT | Performed by: STUDENT IN AN ORGANIZED HEALTH CARE EDUCATION/TRAINING PROGRAM

## 2023-10-23 PROCEDURE — 2022F DILAT RTA XM EVC RTNOPTHY: CPT | Performed by: STUDENT IN AN ORGANIZED HEALTH CARE EDUCATION/TRAINING PROGRAM

## 2023-10-23 PROCEDURE — G8484 FLU IMMUNIZE NO ADMIN: HCPCS | Performed by: STUDENT IN AN ORGANIZED HEALTH CARE EDUCATION/TRAINING PROGRAM

## 2023-10-23 PROCEDURE — 99214 OFFICE O/P EST MOD 30 MIN: CPT | Performed by: STUDENT IN AN ORGANIZED HEALTH CARE EDUCATION/TRAINING PROGRAM

## 2023-10-23 PROCEDURE — G8399 PT W/DXA RESULTS DOCUMENT: HCPCS | Performed by: STUDENT IN AN ORGANIZED HEALTH CARE EDUCATION/TRAINING PROGRAM

## 2023-10-23 PROCEDURE — 1036F TOBACCO NON-USER: CPT | Performed by: STUDENT IN AN ORGANIZED HEALTH CARE EDUCATION/TRAINING PROGRAM

## 2023-10-23 PROCEDURE — 1123F ACP DISCUSS/DSCN MKR DOCD: CPT | Performed by: STUDENT IN AN ORGANIZED HEALTH CARE EDUCATION/TRAINING PROGRAM

## 2023-10-23 PROCEDURE — 3079F DIAST BP 80-89 MM HG: CPT | Performed by: STUDENT IN AN ORGANIZED HEALTH CARE EDUCATION/TRAINING PROGRAM

## 2023-10-23 RX ORDER — LOSARTAN POTASSIUM 100 MG/1
100 TABLET ORAL DAILY
Qty: 90 TABLET | Refills: 3 | Status: SHIPPED | OUTPATIENT
Start: 2023-10-23

## 2023-10-23 RX ORDER — ATORVASTATIN CALCIUM 10 MG/1
10 TABLET, FILM COATED ORAL NIGHTLY
Qty: 90 TABLET | Refills: 1 | Status: SHIPPED | OUTPATIENT
Start: 2023-10-23

## 2023-10-23 RX ORDER — CHLORTHALIDONE 25 MG/1
25 TABLET ORAL DAILY
COMMUNITY
End: 2023-10-23

## 2023-10-23 RX ORDER — CALCIUM CARBONATE 500(1250)
500 TABLET ORAL DAILY
COMMUNITY

## 2023-10-23 RX ORDER — SPIRONOLACTONE AND HYDROCHLOROTHIAZIDE 25; 25 MG/1; MG/1
0.5 TABLET ORAL DAILY
Qty: 1 TABLET | Refills: 0
Start: 2023-10-23

## 2023-10-23 ASSESSMENT — PATIENT HEALTH QUESTIONNAIRE - PHQ9
1. LITTLE INTEREST OR PLEASURE IN DOING THINGS: 0
6. FEELING BAD ABOUT YOURSELF - OR THAT YOU ARE A FAILURE OR HAVE LET YOURSELF OR YOUR FAMILY DOWN: 0
9. THOUGHTS THAT YOU WOULD BE BETTER OFF DEAD, OR OF HURTING YOURSELF: 0
4. FEELING TIRED OR HAVING LITTLE ENERGY: 3
SUM OF ALL RESPONSES TO PHQ QUESTIONS 1-9: 10
SUM OF ALL RESPONSES TO PHQ QUESTIONS 1-9: 10
3. TROUBLE FALLING OR STAYING ASLEEP: 0
7. TROUBLE CONCENTRATING ON THINGS, SUCH AS READING THE NEWSPAPER OR WATCHING TELEVISION: 0
SUM OF ALL RESPONSES TO PHQ QUESTIONS 1-9: 10
2. FEELING DOWN, DEPRESSED OR HOPELESS: 1
SUM OF ALL RESPONSES TO PHQ9 QUESTIONS 1 & 2: 1
5. POOR APPETITE OR OVEREATING: 3
10. IF YOU CHECKED OFF ANY PROBLEMS, HOW DIFFICULT HAVE THESE PROBLEMS MADE IT FOR YOU TO DO YOUR WORK, TAKE CARE OF THINGS AT HOME, OR GET ALONG WITH OTHER PEOPLE: 3
8. MOVING OR SPEAKING SO SLOWLY THAT OTHER PEOPLE COULD HAVE NOTICED. OR THE OPPOSITE, BEING SO FIGETY OR RESTLESS THAT YOU HAVE BEEN MOVING AROUND A LOT MORE THAN USUAL: 3
SUM OF ALL RESPONSES TO PHQ QUESTIONS 1-9: 10

## 2023-10-23 ASSESSMENT — COLUMBIA-SUICIDE SEVERITY RATING SCALE - C-SSRS
7. DID THIS OCCUR IN THE LAST THREE MONTHS: NO
3. HAVE YOU BEEN THINKING ABOUT HOW YOU MIGHT KILL YOURSELF?: NO
4. HAVE YOU HAD THESE THOUGHTS AND HAD SOME INTENTION OF ACTING ON THEM?: NO

## 2023-10-23 ASSESSMENT — ENCOUNTER SYMPTOMS: SHORTNESS OF BREATH: 0

## 2023-10-23 NOTE — PROGRESS NOTES
FOLLOW UP VISIT    Subjective:    Getachew Sims (: 1950) is a 75 y.o., female,   Chief Complaint   Patient presents with    Stroke     2 strokes per Neurology 10/16/23    Fatigue    Hypertension       HPI:    Lives with . Pays her own bills. Tells me that neurology told her that she had 2 new strokes however I don't see evidence of this, no recent MRIs. Of note, patient may have MCI per their note. Some difficulty recalling current BP meds, recent med changes. She brought  bottle of metoprolol succinate and has been putting metoprolol tartrate into this  bottle? BP has been elevated, 162/80 today. She endorses history of white coat HTN. Brought BP log:  10/21: 170/90  10/22: 154/87  10/23: 170/89  In the last 3 days there has been a 'spike' but prior to this BP better, lower 130-140s. Tells me that cardiology stopped amlodipine because of diarrhea per pt, however per cardiology note appears amlodipine was actually increased (?), then tells me stopped amlodipine because she felt sick. Regardless she started back taking amlodipine 10 mg daily yesterday apparently. Taking losartan 100 mg daily, aldactazide half a tab daily. Continues to feel tired all the time. Hasn't made appt to see sleep for her known GABRIEL. Couldn't tolerate CPAP not clear if nasal pillow was tried. DM2: on lantus 42 U nightly she increased this recently. Reports that glucose high in the AM, doesn't recall exact numbers left log at home. A1c 9.4 10/20/23.     The following portions of the patient's history were reviewed and updated as appropriate:      Patient Active Problem List    Diagnosis Date Noted    Paroxysmal atrial fibrillation ([address]) 2023    Myopia of both eyes with regular astigmatism and presbyopia 2019    Posterior vitreous detachment, both eyes 2019    Allergic blepharitis, left 10/03/2018    Angular blepharitis of both eyes 10/03/2018    Mild cognitive impairment

## 2023-10-25 RX ORDER — METOPROLOL SUCCINATE 50 MG/1
50 TABLET, EXTENDED RELEASE ORAL DAILY
Qty: 90 TABLET | Refills: 3 | Status: SHIPPED | OUTPATIENT
Start: 2023-10-25

## 2023-11-06 ENCOUNTER — TELEPHONE (OUTPATIENT)
Age: 73
End: 2023-11-06

## 2023-11-14 ENCOUNTER — OFFICE VISIT (OUTPATIENT)
Dept: INTERNAL MEDICINE CLINIC | Facility: CLINIC | Age: 73
End: 2023-11-14
Payer: MEDICARE

## 2023-11-14 VITALS
SYSTOLIC BLOOD PRESSURE: 154 MMHG | HEIGHT: 62 IN | HEART RATE: 85 BPM | DIASTOLIC BLOOD PRESSURE: 72 MMHG | BODY MASS INDEX: 24.84 KG/M2 | OXYGEN SATURATION: 95 % | WEIGHT: 135 LBS

## 2023-11-14 DIAGNOSIS — I48.0 PAROXYSMAL ATRIAL FIBRILLATION (HCC): ICD-10-CM

## 2023-11-14 DIAGNOSIS — Z79.4 TYPE 2 DIABETES MELLITUS WITH HYPERGLYCEMIA, WITH LONG-TERM CURRENT USE OF INSULIN (HCC): Primary | ICD-10-CM

## 2023-11-14 DIAGNOSIS — E03.9 PRIMARY HYPOTHYROIDISM: ICD-10-CM

## 2023-11-14 DIAGNOSIS — G31.84 MILD COGNITIVE IMPAIRMENT: ICD-10-CM

## 2023-11-14 DIAGNOSIS — I25.119 ATHEROSCLEROSIS OF NATIVE CORONARY ARTERY OF NATIVE HEART WITH ANGINA PECTORIS (HCC): ICD-10-CM

## 2023-11-14 DIAGNOSIS — M35.00 SJOGREN'S SYNDROME, WITH UNSPECIFIED ORGAN INVOLVEMENT (HCC): ICD-10-CM

## 2023-11-14 DIAGNOSIS — E11.65 TYPE 2 DIABETES MELLITUS WITH HYPERGLYCEMIA, WITH LONG-TERM CURRENT USE OF INSULIN (HCC): Primary | ICD-10-CM

## 2023-11-14 DIAGNOSIS — I10 HYPERTENSION, UNCONTROLLED: ICD-10-CM

## 2023-11-14 PROCEDURE — 3077F SYST BP >= 140 MM HG: CPT | Performed by: STUDENT IN AN ORGANIZED HEALTH CARE EDUCATION/TRAINING PROGRAM

## 2023-11-14 PROCEDURE — G8420 CALC BMI NORM PARAMETERS: HCPCS | Performed by: STUDENT IN AN ORGANIZED HEALTH CARE EDUCATION/TRAINING PROGRAM

## 2023-11-14 PROCEDURE — 93294 REM INTERROG EVL PM/LDLS PM: CPT | Performed by: INTERNAL MEDICINE

## 2023-11-14 PROCEDURE — G8484 FLU IMMUNIZE NO ADMIN: HCPCS | Performed by: STUDENT IN AN ORGANIZED HEALTH CARE EDUCATION/TRAINING PROGRAM

## 2023-11-14 PROCEDURE — 3078F DIAST BP <80 MM HG: CPT | Performed by: STUDENT IN AN ORGANIZED HEALTH CARE EDUCATION/TRAINING PROGRAM

## 2023-11-14 PROCEDURE — 99214 OFFICE O/P EST MOD 30 MIN: CPT | Performed by: STUDENT IN AN ORGANIZED HEALTH CARE EDUCATION/TRAINING PROGRAM

## 2023-11-14 PROCEDURE — G8399 PT W/DXA RESULTS DOCUMENT: HCPCS | Performed by: STUDENT IN AN ORGANIZED HEALTH CARE EDUCATION/TRAINING PROGRAM

## 2023-11-14 PROCEDURE — 3017F COLORECTAL CA SCREEN DOC REV: CPT | Performed by: STUDENT IN AN ORGANIZED HEALTH CARE EDUCATION/TRAINING PROGRAM

## 2023-11-14 PROCEDURE — 2022F DILAT RTA XM EVC RTNOPTHY: CPT | Performed by: STUDENT IN AN ORGANIZED HEALTH CARE EDUCATION/TRAINING PROGRAM

## 2023-11-14 PROCEDURE — 1090F PRES/ABSN URINE INCON ASSESS: CPT | Performed by: STUDENT IN AN ORGANIZED HEALTH CARE EDUCATION/TRAINING PROGRAM

## 2023-11-14 PROCEDURE — 93296 REM INTERROG EVL PM/IDS: CPT | Performed by: INTERNAL MEDICINE

## 2023-11-14 PROCEDURE — 1036F TOBACCO NON-USER: CPT | Performed by: STUDENT IN AN ORGANIZED HEALTH CARE EDUCATION/TRAINING PROGRAM

## 2023-11-14 PROCEDURE — G8427 DOCREV CUR MEDS BY ELIG CLIN: HCPCS | Performed by: STUDENT IN AN ORGANIZED HEALTH CARE EDUCATION/TRAINING PROGRAM

## 2023-11-14 PROCEDURE — 3046F HEMOGLOBIN A1C LEVEL >9.0%: CPT | Performed by: STUDENT IN AN ORGANIZED HEALTH CARE EDUCATION/TRAINING PROGRAM

## 2023-11-14 PROCEDURE — 1123F ACP DISCUSS/DSCN MKR DOCD: CPT | Performed by: STUDENT IN AN ORGANIZED HEALTH CARE EDUCATION/TRAINING PROGRAM

## 2023-11-14 RX ORDER — AMLODIPINE BESYLATE 2.5 MG/1
2.5 TABLET ORAL DAILY
Qty: 90 TABLET | Refills: 1 | Status: SHIPPED | OUTPATIENT
Start: 2023-11-14

## 2023-11-14 RX ORDER — VITAMIN B COMPLEX
1 TABLET ORAL DAILY
COMMUNITY

## 2023-11-14 ASSESSMENT — PATIENT HEALTH QUESTIONNAIRE - PHQ9
SUM OF ALL RESPONSES TO PHQ QUESTIONS 1-9: 0
2. FEELING DOWN, DEPRESSED OR HOPELESS: 0
SUM OF ALL RESPONSES TO PHQ QUESTIONS 1-9: 0
SUM OF ALL RESPONSES TO PHQ9 QUESTIONS 1 & 2: 0
SUM OF ALL RESPONSES TO PHQ QUESTIONS 1-9: 0
1. LITTLE INTEREST OR PLEASURE IN DOING THINGS: 0
SUM OF ALL RESPONSES TO PHQ QUESTIONS 1-9: 0

## 2023-11-14 NOTE — PROGRESS NOTES
([address])  Overview:  Rate controlled, on metoprolol, Eliquis, follows with cardiology  4. Hypertension, uncontrolled  Overview:  Blood pressure better, continue metoprolol, Aldactazide, Cozaar. Patient reports dizziness with amlodipine. We will try lowest dose 2.5 mg daily. 5. Atherosclerosis of native coronary artery of native heart with angina pectoris ([address])  Overview:  TTE 10/9/2023: EF 70-75%, normal LV wall thickness, normal wall motion  NST 3/2022: Normal perfusion  6. Primary hypothyroidism  Overview:  TSH 9/22/2023: 1.03. Continue Liscomb Thyroid  7. Mild cognitive impairment  Comments:  Neurology recommend that she go on neuriva  Overview:  Difficulty remembering glucose, med changes. Known encephalomalacia. Seeing neurology. The patient and/or patient representative voiced understanding and agreement with the current diagnoses, recommendations, and possible side effects. Return in about 2 months (around 1/14/2024) for routine follow up, medicare AWE subsequent.      Qamar Akhtar MD

## 2023-11-22 ENCOUNTER — TELEPHONE (OUTPATIENT)
Dept: INTERNAL MEDICINE CLINIC | Facility: CLINIC | Age: 73
End: 2023-11-22

## 2023-11-22 NOTE — TELEPHONE ENCOUNTER
----- Message from La Nena Layne MD sent at 11/22/2023 12:33 PM EST -----  Guerda Hernandez, it looks like  Oc will not be approved by her insurance, but jardiance is which is very similar, could you ask her if she ok if I prescribe this instead?

## 2024-01-25 ENCOUNTER — OFFICE VISIT (OUTPATIENT)
Dept: INTERNAL MEDICINE CLINIC | Facility: CLINIC | Age: 74
End: 2024-01-25
Payer: MEDICARE

## 2024-01-25 VITALS
HEIGHT: 62 IN | SYSTOLIC BLOOD PRESSURE: 168 MMHG | WEIGHT: 134 LBS | DIASTOLIC BLOOD PRESSURE: 84 MMHG | HEART RATE: 76 BPM | OXYGEN SATURATION: 96 % | BODY MASS INDEX: 24.66 KG/M2

## 2024-01-25 DIAGNOSIS — Z00.00 MEDICARE ANNUAL WELLNESS VISIT, SUBSEQUENT: Primary | ICD-10-CM

## 2024-01-25 DIAGNOSIS — E11.65 TYPE 2 DIABETES MELLITUS WITH HYPERGLYCEMIA, WITH LONG-TERM CURRENT USE OF INSULIN (HCC): ICD-10-CM

## 2024-01-25 DIAGNOSIS — I25.119 ATHEROSCLEROSIS OF NATIVE CORONARY ARTERY OF NATIVE HEART WITH ANGINA PECTORIS (HCC): ICD-10-CM

## 2024-01-25 DIAGNOSIS — N18.31 STAGE 3A CHRONIC KIDNEY DISEASE (HCC): ICD-10-CM

## 2024-01-25 DIAGNOSIS — M35.00 SJOGREN'S SYNDROME, WITH UNSPECIFIED ORGAN INVOLVEMENT (HCC): ICD-10-CM

## 2024-01-25 DIAGNOSIS — E78.5 HYPERLIPIDEMIA ASSOCIATED WITH TYPE 2 DIABETES MELLITUS (HCC): ICD-10-CM

## 2024-01-25 DIAGNOSIS — Z79.4 TYPE 2 DIABETES MELLITUS WITH HYPERGLYCEMIA, WITH LONG-TERM CURRENT USE OF INSULIN (HCC): ICD-10-CM

## 2024-01-25 DIAGNOSIS — E11.69 HYPERLIPIDEMIA ASSOCIATED WITH TYPE 2 DIABETES MELLITUS (HCC): ICD-10-CM

## 2024-01-25 DIAGNOSIS — G93.89 ENCEPHALOMALACIA: ICD-10-CM

## 2024-01-25 DIAGNOSIS — Z12.31 ENCOUNTER FOR SCREENING MAMMOGRAM FOR MALIGNANT NEOPLASM OF BREAST: ICD-10-CM

## 2024-01-25 DIAGNOSIS — F32.1 DEPRESSION, MAJOR, SINGLE EPISODE, MODERATE (HCC): ICD-10-CM

## 2024-01-25 DIAGNOSIS — G31.84 MILD COGNITIVE IMPAIRMENT: ICD-10-CM

## 2024-01-25 DIAGNOSIS — I48.0 PAROXYSMAL ATRIAL FIBRILLATION (HCC): ICD-10-CM

## 2024-01-25 DIAGNOSIS — Z12.11 ENCOUNTER FOR SCREENING FOR MALIGNANT NEOPLASM OF COLON: ICD-10-CM

## 2024-01-25 PROCEDURE — 1123F ACP DISCUSS/DSCN MKR DOCD: CPT | Performed by: STUDENT IN AN ORGANIZED HEALTH CARE EDUCATION/TRAINING PROGRAM

## 2024-01-25 PROCEDURE — 3046F HEMOGLOBIN A1C LEVEL >9.0%: CPT | Performed by: STUDENT IN AN ORGANIZED HEALTH CARE EDUCATION/TRAINING PROGRAM

## 2024-01-25 PROCEDURE — 3079F DIAST BP 80-89 MM HG: CPT | Performed by: STUDENT IN AN ORGANIZED HEALTH CARE EDUCATION/TRAINING PROGRAM

## 2024-01-25 PROCEDURE — 3017F COLORECTAL CA SCREEN DOC REV: CPT | Performed by: STUDENT IN AN ORGANIZED HEALTH CARE EDUCATION/TRAINING PROGRAM

## 2024-01-25 PROCEDURE — G8484 FLU IMMUNIZE NO ADMIN: HCPCS | Performed by: STUDENT IN AN ORGANIZED HEALTH CARE EDUCATION/TRAINING PROGRAM

## 2024-01-25 PROCEDURE — 3077F SYST BP >= 140 MM HG: CPT | Performed by: STUDENT IN AN ORGANIZED HEALTH CARE EDUCATION/TRAINING PROGRAM

## 2024-01-25 PROCEDURE — G0439 PPPS, SUBSEQ VISIT: HCPCS | Performed by: STUDENT IN AN ORGANIZED HEALTH CARE EDUCATION/TRAINING PROGRAM

## 2024-01-25 RX ORDER — AMLODIPINE BESYLATE 2.5 MG/1
5 TABLET ORAL DAILY
Qty: 90 TABLET | Refills: 1
Start: 2024-01-25

## 2024-01-25 ASSESSMENT — PATIENT HEALTH QUESTIONNAIRE - PHQ9
SUM OF ALL RESPONSES TO PHQ QUESTIONS 1-9: 3
1. LITTLE INTEREST OR PLEASURE IN DOING THINGS: 0
8. MOVING OR SPEAKING SO SLOWLY THAT OTHER PEOPLE COULD HAVE NOTICED. OR THE OPPOSITE, BEING SO FIGETY OR RESTLESS THAT YOU HAVE BEEN MOVING AROUND A LOT MORE THAN USUAL: 0
3. TROUBLE FALLING OR STAYING ASLEEP: 0
9. THOUGHTS THAT YOU WOULD BE BETTER OFF DEAD, OR OF HURTING YOURSELF: 0
10. IF YOU CHECKED OFF ANY PROBLEMS, HOW DIFFICULT HAVE THESE PROBLEMS MADE IT FOR YOU TO DO YOUR WORK, TAKE CARE OF THINGS AT HOME, OR GET ALONG WITH OTHER PEOPLE: 1
SUM OF ALL RESPONSES TO PHQ QUESTIONS 1-9: 3
4. FEELING TIRED OR HAVING LITTLE ENERGY: 2
5. POOR APPETITE OR OVEREATING: 0
SUM OF ALL RESPONSES TO PHQ QUESTIONS 1-9: 3
6. FEELING BAD ABOUT YOURSELF - OR THAT YOU ARE A FAILURE OR HAVE LET YOURSELF OR YOUR FAMILY DOWN: 0
2. FEELING DOWN, DEPRESSED OR HOPELESS: 1
7. TROUBLE CONCENTRATING ON THINGS, SUCH AS READING THE NEWSPAPER OR WATCHING TELEVISION: 0
SUM OF ALL RESPONSES TO PHQ9 QUESTIONS 1 & 2: 1
SUM OF ALL RESPONSES TO PHQ QUESTIONS 1-9: 3

## 2024-01-25 ASSESSMENT — LIFESTYLE VARIABLES: HOW OFTEN DO YOU HAVE A DRINK CONTAINING ALCOHOL: NEVER

## 2024-01-25 NOTE — PATIENT INSTRUCTIONS
Learning About Activities of Daily Living  What are activities of daily living?     Activities of daily living (ADLs) are the basic self-care tasks you do every day. These include eating, bathing, dressing, and moving around.  As you age, and if you have health problems, you may find that it's harder to do some of these tasks. If so, your doctor can suggest ideas that may help.  To measure what kind of help you may need, your doctor will ask how well you are able to do ADLs. Let your doctor know if there are any tasks that you are having trouble doing. This is an important first step to getting help. And when you have the help you need, you can stay as independent as possible.  How will a doctor assess your ADLs?  Asking about ADLs is part of a routine health checkup your doctor will likely do as you age. Your health check might be done in a doctor's office, in your home, or at a hospital. The goal is to find out if you are having any problems that could make it hard to care for yourself or that make it unsafe for you to be on your own.  To measure your ADLs, your doctor will ask how hard it is for you to do routine tasks. Your doctor may also want to know if you have changed the way you do a task because of a health problem. Your doctor may watch how you:  Walk back and forth.  Keep your balance while you stand or walk.  Move from sitting to standing or from a bed to a chair.  Button or unbutton a shirt or sweater.  Remove and put on your shoes.  It's common to feel a little worried or anxious if you find you can't do all the things you used to be able to do. Talking with your doctor about ADLs is a way to make sure you're as safe as possible and able to care for yourself as well as you can. You may want to bring a caregiver, friend, or family member to your checkup. They can help you talk to your doctor.  Follow-up care is a key part of your treatment and safety. Be sure to make and go to all appointments,

## 2024-01-25 NOTE — PROGRESS NOTES
Medicare Annual Wellness Visit    Constance Botello is here for Medicare AWV    Assessment & Plan   1. Medicare annual wellness visit, subsequent  2. Type 2 diabetes mellitus with hyperglycemia, with long-term current use of insulin (HCC)  Comments:  Increase Lantus to 45 units and encouraged for her to take this at night so we can follow fasting sugar  Orders:  -     empagliflozin (JARDIANCE) 25 MG tablet; Take 1 tablet by mouth daily, Disp-90 tablet, R-1Normal  -     Comprehensive Metabolic Panel; Future  -     Hemoglobin A1C; Future  -     insulin glargine (LANTUS;BASAGLAR) 100 UNIT/ML injection pen; Inject 45 Units into the skin nightly, Disp-45 Adjustable Dose Pre-filled Pen Syringe, R-0NO PRINT  3. Encounter for screening for malignant neoplasm of colon  -     External Referral To Gastroenterology  4. Sjogren's syndrome, with unspecified organ involvement ([facility])  Overview:  She denies issues.  Was referred to rheumatology but declined to schedule.  5. Depression, major, single episode, moderate (HCC)  6. Paroxysmal atrial fibrillation (HCC)  Overview:  Rate controlled, on metoprolol, Eliquis, follows with cardiology  7. Hyperlipidemia associated with type 2 diabetes mellitus (HCC)  8. Atherosclerosis of native coronary artery of native heart with angina pectoris (HCC)  Overview:  TTE 10/9/2023: EF 70-75%, normal LV wall thickness, normal wall motion  NST 3/2022: Normal perfusion  9. Stage 3a chronic kidney disease (HCC)  Overview:  Avoids NSAIDs. Baseline Cr 1.1-1.2  10. Encounter for screening mammogram for malignant neoplasm of breast  -     MEÑO DIGITAL SCREEN W OR WO CAD BILATERAL; Future  11. Encephalomalacia  12. Mild cognitive impairment  Overview:  Difficulty remembering glucose, med changes, however she does keep record of these things.  Has known encephalomalacia.  Seeing neurology.        Recommendations for Preventive Services Due: see orders and patient instructions/AVS.  Recommended screening

## 2024-02-12 ENCOUNTER — TELEPHONE (OUTPATIENT)
Age: 74
End: 2024-02-12

## 2024-02-12 NOTE — TELEPHONE ENCOUNTER
Patient called stating she is experiencing the following :    SC=486/87 today  Blood sugar was 386 on Saturday, nauseated and voming  Walk in Clinic revealed elevated BP  Much better today

## 2024-02-12 NOTE — TELEPHONE ENCOUNTER
Pt states she was seen at urgent care for hypertension and they advised her to see Dr. Pedroza.  Triage offered 2/13/24 appt at 8:15 at [facility]. Pt declines.  Pt states her BP is 150/87 today and she has f/u appt 2/19/24.  Feeling well at this time. States she will call our office back if needed.

## 2024-02-13 ENCOUNTER — TELEPHONE (OUTPATIENT)
Dept: INTERNAL MEDICINE CLINIC | Facility: CLINIC | Age: 74
End: 2024-02-13

## 2024-02-13 NOTE — TELEPHONE ENCOUNTER
Pt called stating that she was seen at Urgent care this weekend for high b/p.   Since then she has started feeling weak,, has sinus congestion an occ. Non productive cough with chest pain when coughing. B/P around 12:30 was 155/78. She denies fever, chills, N/V, or SOB. She has not been tested for COVID, but does have a home test and will take the test and I advised her that I will call her back to review the results.

## 2024-02-13 NOTE — TELEPHONE ENCOUNTER
Pt advised to arrive tomorrow at 12:50 for an appointment with Dr Calderon. I asked her to please wear a mask just incase.

## 2024-02-14 ENCOUNTER — OFFICE VISIT (OUTPATIENT)
Dept: INTERNAL MEDICINE CLINIC | Facility: CLINIC | Age: 74
End: 2024-02-14
Payer: MEDICARE

## 2024-02-14 VITALS
SYSTOLIC BLOOD PRESSURE: 172 MMHG | BODY MASS INDEX: 24.66 KG/M2 | OXYGEN SATURATION: 96 % | TEMPERATURE: 98 F | DIASTOLIC BLOOD PRESSURE: 80 MMHG | WEIGHT: 134 LBS | HEIGHT: 62 IN | HEART RATE: 96 BPM

## 2024-02-14 DIAGNOSIS — I10 HYPERTENSION, UNCONTROLLED: ICD-10-CM

## 2024-02-14 DIAGNOSIS — Z79.4 TYPE 2 DIABETES MELLITUS WITH HYPERGLYCEMIA, WITH LONG-TERM CURRENT USE OF INSULIN (HCC): ICD-10-CM

## 2024-02-14 DIAGNOSIS — E11.65 TYPE 2 DIABETES MELLITUS WITH HYPERGLYCEMIA, WITH LONG-TERM CURRENT USE OF INSULIN (HCC): ICD-10-CM

## 2024-02-14 DIAGNOSIS — R09.81 SINUS CONGESTION: Primary | ICD-10-CM

## 2024-02-14 DIAGNOSIS — G93.89 ENCEPHALOMALACIA: ICD-10-CM

## 2024-02-14 PROCEDURE — 99214 OFFICE O/P EST MOD 30 MIN: CPT | Performed by: STUDENT IN AN ORGANIZED HEALTH CARE EDUCATION/TRAINING PROGRAM

## 2024-02-14 PROCEDURE — G8484 FLU IMMUNIZE NO ADMIN: HCPCS | Performed by: STUDENT IN AN ORGANIZED HEALTH CARE EDUCATION/TRAINING PROGRAM

## 2024-02-14 PROCEDURE — 3079F DIAST BP 80-89 MM HG: CPT | Performed by: STUDENT IN AN ORGANIZED HEALTH CARE EDUCATION/TRAINING PROGRAM

## 2024-02-14 PROCEDURE — G8420 CALC BMI NORM PARAMETERS: HCPCS | Performed by: STUDENT IN AN ORGANIZED HEALTH CARE EDUCATION/TRAINING PROGRAM

## 2024-02-14 PROCEDURE — G8427 DOCREV CUR MEDS BY ELIG CLIN: HCPCS | Performed by: STUDENT IN AN ORGANIZED HEALTH CARE EDUCATION/TRAINING PROGRAM

## 2024-02-14 PROCEDURE — 3077F SYST BP >= 140 MM HG: CPT | Performed by: STUDENT IN AN ORGANIZED HEALTH CARE EDUCATION/TRAINING PROGRAM

## 2024-02-14 PROCEDURE — 1090F PRES/ABSN URINE INCON ASSESS: CPT | Performed by: STUDENT IN AN ORGANIZED HEALTH CARE EDUCATION/TRAINING PROGRAM

## 2024-02-14 PROCEDURE — G8399 PT W/DXA RESULTS DOCUMENT: HCPCS | Performed by: STUDENT IN AN ORGANIZED HEALTH CARE EDUCATION/TRAINING PROGRAM

## 2024-02-14 PROCEDURE — 3017F COLORECTAL CA SCREEN DOC REV: CPT | Performed by: STUDENT IN AN ORGANIZED HEALTH CARE EDUCATION/TRAINING PROGRAM

## 2024-02-14 PROCEDURE — 1123F ACP DISCUSS/DSCN MKR DOCD: CPT | Performed by: STUDENT IN AN ORGANIZED HEALTH CARE EDUCATION/TRAINING PROGRAM

## 2024-02-14 PROCEDURE — 2022F DILAT RTA XM EVC RTNOPTHY: CPT | Performed by: STUDENT IN AN ORGANIZED HEALTH CARE EDUCATION/TRAINING PROGRAM

## 2024-02-14 PROCEDURE — 3046F HEMOGLOBIN A1C LEVEL >9.0%: CPT | Performed by: STUDENT IN AN ORGANIZED HEALTH CARE EDUCATION/TRAINING PROGRAM

## 2024-02-14 PROCEDURE — 1036F TOBACCO NON-USER: CPT | Performed by: STUDENT IN AN ORGANIZED HEALTH CARE EDUCATION/TRAINING PROGRAM

## 2024-02-14 RX ORDER — AMLODIPINE BESYLATE 2.5 MG/1
2.5 TABLET ORAL DAILY
Qty: 90 TABLET | Refills: 1 | Status: SHIPPED | OUTPATIENT
Start: 2024-02-14

## 2024-02-14 RX ORDER — AMOXICILLIN 500 MG/1
500 CAPSULE ORAL 2 TIMES DAILY
Qty: 14 CAPSULE | Refills: 0 | Status: SHIPPED | OUTPATIENT
Start: 2024-02-14 | End: 2024-02-21

## 2024-02-14 NOTE — PROGRESS NOTES
FOLLOW UP VISIT    Subjective:    Constance Botello (: 1950) is a 73 y.o., female,   Chief Complaint   Patient presents with    Sinus Problem    Cough    Hypertension     Out of Amlpdipine and unsure what her dosage is and how she takes it.     Memory Loss     Spouse concerned about her memory       HPI:    4 days ago started vomiting non-bloody, BP increased, EMS was called and did an evaluation and provided her with reassurance. Vomiting stopped. Also having lots of coughing 4 days ago. Feeling less weak.  No abdominal pain, CP, SOB, no fever, chills, myalgias.  When she coughs will have chest pain, now having rare cough.    She started plain amoxicillin 3 days ago today which helped, took this once a day    Had sinus pain, pressure yesterday but it is gone now, she thinks amoxicillin helped this.    She has actually not been taking amlodipine. Pharmacy wouldn't refill, I will refill this today.    Tested negative for COVID.    Some shaking in the AM which is new, glucose 70-80s in the AM a few days ago     has noticed decline in short term memory, in about the past year.    The following portions of the patient's history were reviewed and updated as appropriate:      Patient Active Problem List    Diagnosis Date Noted    Paroxysmal atrial fibrillation (HCC) 2023    Myopia of both eyes with regular astigmatism and presbyopia 2019    Posterior vitreous detachment, both eyes 2019    Allergic blepharitis, left 10/03/2018    Angular blepharitis of both eyes 10/03/2018    Encephalomalacia 2024    Mild cognitive impairment 10/23/2023    History of CVA (cerebrovascular accident) 10/23/2023    Chronic renal disease, stage III ([facility]) [463349] 2023    Angina pectoris, unspecified 2023    Atherosclerotic heart disease of native coronary artery with unspecified angina pectoris 2023    Leg cramping 2023    Type 2 diabetes mellitus with hyperglycemia 2023

## 2024-03-15 ENCOUNTER — OFFICE VISIT (OUTPATIENT)
Dept: INTERNAL MEDICINE CLINIC | Facility: CLINIC | Age: 74
End: 2024-03-15

## 2024-03-15 VITALS
HEART RATE: 102 BPM | DIASTOLIC BLOOD PRESSURE: 78 MMHG | OXYGEN SATURATION: 95 % | SYSTOLIC BLOOD PRESSURE: 162 MMHG | BODY MASS INDEX: 24.33 KG/M2 | WEIGHT: 133 LBS

## 2024-03-15 DIAGNOSIS — E78.2 MIXED HYPERLIPIDEMIA: ICD-10-CM

## 2024-03-15 DIAGNOSIS — E11.65 TYPE 2 DIABETES MELLITUS WITH HYPERGLYCEMIA, WITH LONG-TERM CURRENT USE OF INSULIN (HCC): ICD-10-CM

## 2024-03-15 DIAGNOSIS — Z79.4 TYPE 2 DIABETES MELLITUS WITH HYPERGLYCEMIA, WITH LONG-TERM CURRENT USE OF INSULIN (HCC): ICD-10-CM

## 2024-03-15 DIAGNOSIS — I10 HYPERTENSION, UNCONTROLLED: Primary | ICD-10-CM

## 2024-03-15 DIAGNOSIS — E03.9 PRIMARY HYPOTHYROIDISM: ICD-10-CM

## 2024-03-15 DIAGNOSIS — I48.0 PAROXYSMAL ATRIAL FIBRILLATION (HCC): ICD-10-CM

## 2024-03-15 LAB
ALBUMIN SERPL-MCNC: 3.6 G/DL (ref 3.2–4.6)
ALBUMIN/GLOB SERPL: 1 (ref 0.4–1.6)
ALP SERPL-CCNC: 67 U/L (ref 50–136)
ALT SERPL-CCNC: 23 U/L (ref 12–65)
ANION GAP SERPL CALC-SCNC: 9 MMOL/L (ref 2–11)
AST SERPL-CCNC: 15 U/L (ref 15–37)
BILIRUB SERPL-MCNC: 0.3 MG/DL (ref 0.2–1.1)
BUN SERPL-MCNC: 31 MG/DL (ref 8–23)
CALCIUM SERPL-MCNC: 9.4 MG/DL (ref 8.3–10.4)
CHLORIDE SERPL-SCNC: 100 MMOL/L (ref 103–113)
CO2 SERPL-SCNC: 27 MMOL/L (ref 21–32)
CREAT SERPL-MCNC: 1.3 MG/DL (ref 0.6–1)
EST. AVERAGE GLUCOSE BLD GHB EST-MCNC: 252 MG/DL
GLOBULIN SER CALC-MCNC: 3.5 G/DL (ref 2.8–4.5)
GLUCOSE SERPL-MCNC: 235 MG/DL (ref 65–100)
HBA1C MFR BLD: 10.4 % (ref 4.8–5.6)
POTASSIUM SERPL-SCNC: 4.5 MMOL/L (ref 3.5–5.1)
PROT SERPL-MCNC: 7.1 G/DL (ref 6.3–8.2)
SODIUM SERPL-SCNC: 136 MMOL/L (ref 136–146)
TSH W FREE THYROID IF ABNORMAL: 0.64 UIU/ML (ref 0.36–3.74)

## 2024-03-15 RX ORDER — AMLODIPINE BESYLATE 5 MG/1
5 TABLET ORAL DAILY
Qty: 90 TABLET | Refills: 1
Start: 2024-03-15

## 2024-03-15 RX ORDER — MONTELUKAST SODIUM 4 MG/1
1 TABLET, CHEWABLE ORAL 2 TIMES DAILY
Qty: 180 TABLET | Refills: 3 | Status: SHIPPED | OUTPATIENT
Start: 2024-03-15

## 2024-03-15 NOTE — PROGRESS NOTES
FOLLOW UP VISIT    Subjective:    Constance Botello (: 1950) is a 73 y.o., female,   Chief Complaint   Patient presents with    Follow-up       HPI:  Patient brought in all the medication she is taking for accurate medication reconciliation.    Was sick started a month ago but feeling better. 1 day n/v but resolved. Weak. No cough, sore throat, no fever.    DM2: fasting glucose running 90-140s. Taking 44 U lantus nightly.  Jardiance, farxiga not covered/too expensive    HTN: BP readings last month 132/65, 144/80, 160/85, 160/79, 138/73. Most recently 140/76, 150/83.    Hypothyroidism: reports she couldn't tolerate levothyroxine so is on armor thyroid    The following portions of the patient's history were reviewed and updated as appropriate:      Current Outpatient Medications   Medication Sig Dispense Refill    insulin glargine (LANTUS;BASAGLAR) 100 UNIT/ML injection pen Inject 44 Units into the skin nightly 3 Adjustable Dose Pre-filled Pen Syringe 5    colestipol (COLESTID) 1 g tablet Take 1 tablet by mouth 2 times daily 180 tablet 3    apixaban (ELIQUIS) 5 MG TABS tablet Take 1 tablet by mouth 2 times daily 180 tablet 3    amLODIPine (NORVASC) 5 MG tablet Take 1 tablet by mouth daily 90 tablet 1    NONFORMULARY Take 1 tablet by mouth daily as needed (hypertension) Healthy Blood Pressure Support- Naticol 1000 marine collagen      Coenzyme Q10 (COQ10) 100 MG CAPS Take 1 capsule by mouth daily      metoprolol succinate (TOPROL XL) 50 MG extended release tablet Take 1 tablet by mouth daily 90 tablet 3    calcium carbonate (OSCAL) 500 MG TABS tablet Take 1 tablet by mouth daily      losartan (COZAAR) 100 MG tablet Take 1 tablet by mouth daily 90 tablet 3    atorvastatin (LIPITOR) 10 MG tablet Take 1 tablet by mouth at bedtime 90 tablet 1    Misc Natural Products (NEURIVA) CAPS Take 1 each by mouth daily 1 capsule 0    thyroid (ARMOUR) 30 MG tablet Take 1 tablet by mouth daily 90 tablet 3    Flaxseed, Linseed,

## 2024-03-16 NOTE — PROGRESS NOTES
[facility] CARDIOLOGY  [address] [place] [address], [address]  [place] [address]  PHONE: [phone]        NAME:  Constance Botello  : 1950  MRN: [id number]     PCP:  Janet Calderon MD      SUBJECTIVE:   Constance Botello is a 73 y.o. female seen for a follow up visit regarding the following:     Chief Complaint   Patient presents with    Atrial Fibrillation    Follow-up    Hypertension       HPI:    Recently admitted to [facility] with high-grade AV block and syncope with fall.  She had a dual-chamber MRI safe Medtronic pacemaker implantation with good result...  She is doing well from a pacemaker standpoint and had a nuclear stress test recently showing normal perfusion with normal ejection fraction and regional wall motion.  Pacemaker site well-healed, interrogations stable.     Her blood pressure is elevated consistently at home and here today despite medication compliance (recently increased losartan to 50 mg twice daily but blood pressure remains elevated).  Increased amlodipine to 10mg daily with no increase in lower extremity edema and drop in systolics to 130's  but currently only taking 5mg daily.  She has whitecoat hypertension here and in all MD offices.  Cannot tolerate statins, refuses, has tried numerous statins in the past, all with side effects.  Trying to watch diet, walking for exercise without any limitations or exertional symptoms.   earlier this year.  Familial most likely.  Willing to try Repatha, but insurance would not approve. Taking colestipol now, checking lipids soon with PCP...    Being evaluated by [facility] neurology and [facility] for bilateral vertebral artery stenosis, currently stable without any recent neurologic symptoms on Eliquis twice daily with good compliance.    Her BP has been previously well controlled and she reported a sudden loss of BP control with persistently severely elevated blood pressure despite compliance with

## 2024-03-19 ENCOUNTER — OFFICE VISIT (OUTPATIENT)
Age: 74
End: 2024-03-19
Payer: MEDICARE

## 2024-03-19 ENCOUNTER — NURSE ONLY (OUTPATIENT)
Age: 74
End: 2024-03-19

## 2024-03-19 VITALS
HEART RATE: 90 BPM | WEIGHT: 132 LBS | DIASTOLIC BLOOD PRESSURE: 78 MMHG | BODY MASS INDEX: 24.14 KG/M2 | SYSTOLIC BLOOD PRESSURE: 154 MMHG

## 2024-03-19 DIAGNOSIS — R00.1 SYMPTOMATIC BRADYCARDIA: Primary | ICD-10-CM

## 2024-03-19 DIAGNOSIS — I25.119 ATHEROSCLEROSIS OF NATIVE CORONARY ARTERY OF NATIVE HEART WITH ANGINA PECTORIS (HCC): ICD-10-CM

## 2024-03-19 DIAGNOSIS — G47.33 OSA (OBSTRUCTIVE SLEEP APNEA): ICD-10-CM

## 2024-03-19 DIAGNOSIS — E78.2 MIXED HYPERLIPIDEMIA: ICD-10-CM

## 2024-03-19 DIAGNOSIS — Z86.73 HISTORY OF CVA (CEREBROVASCULAR ACCIDENT): ICD-10-CM

## 2024-03-19 DIAGNOSIS — Z95.0 PACEMAKER: ICD-10-CM

## 2024-03-19 DIAGNOSIS — I48.0 PAROXYSMAL ATRIAL FIBRILLATION (HCC): Primary | ICD-10-CM

## 2024-03-19 PROCEDURE — 1036F TOBACCO NON-USER: CPT | Performed by: INTERNAL MEDICINE

## 2024-03-19 PROCEDURE — G8399 PT W/DXA RESULTS DOCUMENT: HCPCS | Performed by: INTERNAL MEDICINE

## 2024-03-19 PROCEDURE — 3017F COLORECTAL CA SCREEN DOC REV: CPT | Performed by: INTERNAL MEDICINE

## 2024-03-19 PROCEDURE — G8420 CALC BMI NORM PARAMETERS: HCPCS | Performed by: INTERNAL MEDICINE

## 2024-03-19 PROCEDURE — 1090F PRES/ABSN URINE INCON ASSESS: CPT | Performed by: INTERNAL MEDICINE

## 2024-03-19 PROCEDURE — 99214 OFFICE O/P EST MOD 30 MIN: CPT | Performed by: INTERNAL MEDICINE

## 2024-03-19 PROCEDURE — G8484 FLU IMMUNIZE NO ADMIN: HCPCS | Performed by: INTERNAL MEDICINE

## 2024-03-19 PROCEDURE — G8427 DOCREV CUR MEDS BY ELIG CLIN: HCPCS | Performed by: INTERNAL MEDICINE

## 2024-03-19 PROCEDURE — 1123F ACP DISCUSS/DSCN MKR DOCD: CPT | Performed by: INTERNAL MEDICINE

## 2024-03-19 PROCEDURE — 3078F DIAST BP <80 MM HG: CPT | Performed by: INTERNAL MEDICINE

## 2024-03-19 PROCEDURE — 3077F SYST BP >= 140 MM HG: CPT | Performed by: INTERNAL MEDICINE

## 2024-05-17 NOTE — PROGRESS NOTES
[facility]  [address]  [place] [address]  [phone]    Patient Name:  Constance Botello  YOB: 1950      Office Visit 5/20/2024    CHIEF COMPLAINT:    Chief Complaint   Patient presents with    Sleep Apnea    Follow-up             HISTORY OF PRESENT ILLNESS:    Patient is a 73-year-old female who presents for follow-up of GABRIEL.  Diagnostic sleep study on 07/28/2022 with an AHI of 12.2 and lowest oxygen saturation of 84%.  She has had another sleep study since her last visit which shows mild sleep apnea with an AHI of 5.8 and lowest oxygen saturation of 83%.  She did have 11.8 minutes of oxygen saturations less than 89%.  She has previously tried CPAP and did not meet Medicare compliance so she had to turn her CPAP machine back to the medical supply company.  We also referred her to dentistry for possible oral appliance but she decided she did not want to do oral appliance.  At her last visit over 1 year ago she wanted another sleep study ordered so she could requalify for CPAP.  She returns today to discuss treatment options.  She has her  and a friend who is a nurse present with her during exam.  She reports that she brought them with her because her memory has been getting worse.  We did review all of her sleep studies in detail and reviewed her past treatment trials.  I did previously refer her to dentistry for an oral appliance but she decided she did not want to do this.  Her  inquires about inspire therapy but she is not a candidate due to the level of her sleep apnea being mild.  She reports she is tired of waking every morning and feeling like she has not slept well through the night.  She is also waking up during the night gasping for air.  After long discussion about treatment options she does not feel that she would ever be able to tolerate CPAP and is willing to be reevaluated for oral appliance at dentistry.  She reports that her weight

## 2024-05-20 ENCOUNTER — OFFICE VISIT (OUTPATIENT)
Dept: SLEEP MEDICINE | Age: 74
End: 2024-05-20
Payer: MEDICARE

## 2024-05-20 VITALS
HEIGHT: 62 IN | BODY MASS INDEX: 24.84 KG/M2 | WEIGHT: 135 LBS | DIASTOLIC BLOOD PRESSURE: 104 MMHG | HEART RATE: 105 BPM | SYSTOLIC BLOOD PRESSURE: 187 MMHG | OXYGEN SATURATION: 92 %

## 2024-05-20 DIAGNOSIS — G47.00 PERSISTENT DISORDER OF INITIATING OR MAINTAINING SLEEP: ICD-10-CM

## 2024-05-20 DIAGNOSIS — R41.3 MEMORY LOSS: ICD-10-CM

## 2024-05-20 DIAGNOSIS — G47.8 NON-RESTORATIVE SLEEP: ICD-10-CM

## 2024-05-20 DIAGNOSIS — G47.34 NOCTURNAL HYPOXEMIA: ICD-10-CM

## 2024-05-20 DIAGNOSIS — G47.33 OSA (OBSTRUCTIVE SLEEP APNEA): Primary | ICD-10-CM

## 2024-05-20 PROCEDURE — 1090F PRES/ABSN URINE INCON ASSESS: CPT | Performed by: NURSE PRACTITIONER

## 2024-05-20 PROCEDURE — G8399 PT W/DXA RESULTS DOCUMENT: HCPCS | Performed by: NURSE PRACTITIONER

## 2024-05-20 PROCEDURE — 1123F ACP DISCUSS/DSCN MKR DOCD: CPT | Performed by: NURSE PRACTITIONER

## 2024-05-20 PROCEDURE — G8427 DOCREV CUR MEDS BY ELIG CLIN: HCPCS | Performed by: NURSE PRACTITIONER

## 2024-05-20 PROCEDURE — 99213 OFFICE O/P EST LOW 20 MIN: CPT | Performed by: NURSE PRACTITIONER

## 2024-05-20 PROCEDURE — 3080F DIAST BP >= 90 MM HG: CPT | Performed by: NURSE PRACTITIONER

## 2024-05-20 PROCEDURE — 3077F SYST BP >= 140 MM HG: CPT | Performed by: NURSE PRACTITIONER

## 2024-05-20 PROCEDURE — G8420 CALC BMI NORM PARAMETERS: HCPCS | Performed by: NURSE PRACTITIONER

## 2024-05-20 PROCEDURE — 1036F TOBACCO NON-USER: CPT | Performed by: NURSE PRACTITIONER

## 2024-05-20 PROCEDURE — 3017F COLORECTAL CA SCREEN DOC REV: CPT | Performed by: NURSE PRACTITIONER

## 2024-05-20 ASSESSMENT — SLEEP AND FATIGUE QUESTIONNAIRES
ESS TOTAL SCORE: 4
HOW LIKELY ARE YOU TO NOD OFF OR FALL ASLEEP WHILE SITTING AND TALKING TO SOMEONE: WOULD NEVER DOZE
HOW LIKELY ARE YOU TO NOD OFF OR FALL ASLEEP WHILE WATCHING TV: WOULD NEVER DOZE
HOW LIKELY ARE YOU TO NOD OFF OR FALL ASLEEP WHILE SITTING INACTIVE IN A PUBLIC PLACE: WOULD NEVER DOZE
HOW LIKELY ARE YOU TO NOD OFF OR FALL ASLEEP WHILE LYING DOWN TO REST IN THE AFTERNOON WHEN CIRCUMSTANCES PERMIT: HIGH CHANCE OF DOZING
HOW LIKELY ARE YOU TO NOD OFF OR FALL ASLEEP WHILE SITTING AND READING: WOULD NEVER DOZE
HOW LIKELY ARE YOU TO NOD OFF OR FALL ASLEEP WHEN YOU ARE A PASSENGER IN A CAR FOR AN HOUR WITHOUT A BREAK: SLIGHT CHANCE OF DOZING
HOW LIKELY ARE YOU TO NOD OFF OR FALL ASLEEP IN A CAR, WHILE STOPPED FOR A FEW MINUTES IN TRAFFIC: WOULD NEVER DOZE
HOW LIKELY ARE YOU TO NOD OFF OR FALL ASLEEP WHILE SITTING QUIETLY AFTER LUNCH WITHOUT ALCOHOL: WOULD NEVER DOZE

## 2024-05-20 NOTE — PATIENT INSTRUCTIONS
Referral to sleep well in [place] for oral appliance  Recommendations as above  Follow-up in 6 months or sooner if needed

## 2024-05-21 ENCOUNTER — TELEPHONE (OUTPATIENT)
Dept: SLEEP MEDICINE | Age: 74
End: 2024-05-21

## 2024-05-29 ENCOUNTER — HOSPITAL ENCOUNTER (EMERGENCY)
Age: 74
Discharge: HOME | End: 2024-05-29
Attending: EMERGENCY MEDICINE
Payer: MEDICARE

## 2024-05-29 ENCOUNTER — APPOINTMENT (OUTPATIENT)
Dept: GENERAL RADIOLOGY | Age: 74
End: 2024-05-29
Payer: MEDICARE

## 2024-05-29 VITALS
HEART RATE: 74 BPM | TEMPERATURE: 98.9 F | DIASTOLIC BLOOD PRESSURE: 81 MMHG | SYSTOLIC BLOOD PRESSURE: 183 MMHG | BODY MASS INDEX: 23.92 KG/M2 | HEIGHT: 62 IN | OXYGEN SATURATION: 98 % | WEIGHT: 130 LBS | RESPIRATION RATE: 18 BRPM

## 2024-05-29 DIAGNOSIS — R07.9 ACUTE CHEST PAIN: Primary | ICD-10-CM

## 2024-05-29 LAB
ALBUMIN SERPL-MCNC: 3.7 G/DL (ref 3.2–4.6)
ALBUMIN/GLOB SERPL: 1.1 (ref 1–1.9)
ALP SERPL-CCNC: 58 U/L (ref 35–104)
ALT SERPL-CCNC: 19 U/L (ref 12–65)
ANION GAP SERPL CALC-SCNC: 14 MMOL/L (ref 9–18)
AST SERPL-CCNC: 25 U/L (ref 15–37)
BASOPHILS # BLD: 0.1 K/UL (ref 0–0.2)
BASOPHILS NFR BLD: 1 % (ref 0–2)
BILIRUB SERPL-MCNC: 0.3 MG/DL (ref 0–1.2)
BUN SERPL-MCNC: 16 MG/DL (ref 8–23)
CALCIUM SERPL-MCNC: 9.5 MG/DL (ref 8.8–10.2)
CHLORIDE SERPL-SCNC: 101 MMOL/L (ref 98–107)
CO2 SERPL-SCNC: 23 MMOL/L (ref 20–28)
CREAT SERPL-MCNC: 0.87 MG/DL (ref 0.6–1.1)
DIFFERENTIAL METHOD BLD: NORMAL
EKG ATRIAL RATE: 83 BPM
EKG DIAGNOSIS: NORMAL
EKG P AXIS: 43 DEGREES
EKG P-R INTERVAL: 168 MS
EKG Q-T INTERVAL: 378 MS
EKG QRS DURATION: 77 MS
EKG QTC CALCULATION (BAZETT): 447 MS
EKG R AXIS: 0 DEGREES
EKG T AXIS: 59 DEGREES
EKG VENTRICULAR RATE: 84 BPM
EOSINOPHIL # BLD: 0.2 K/UL (ref 0–0.8)
EOSINOPHIL NFR BLD: 2 % (ref 0.5–7.8)
ERYTHROCYTE [DISTWIDTH] IN BLOOD BY AUTOMATED COUNT: 12.7 % (ref 11.9–14.6)
GLOBULIN SER CALC-MCNC: 3.5 G/DL (ref 2.3–3.5)
GLUCOSE SERPL-MCNC: 230 MG/DL (ref 70–99)
HCT VFR BLD AUTO: 40.8 % (ref 35.8–46.3)
HGB BLD-MCNC: 13.3 G/DL (ref 11.7–15.4)
IMM GRANULOCYTES # BLD AUTO: 0 K/UL (ref 0–0.5)
IMM GRANULOCYTES NFR BLD AUTO: 0 % (ref 0–5)
LYMPHOCYTES # BLD: 2 K/UL (ref 0.5–4.6)
LYMPHOCYTES NFR BLD: 21 % (ref 13–44)
MCH RBC QN AUTO: 28 PG (ref 26.1–32.9)
MCHC RBC AUTO-ENTMCNC: 32.6 G/DL (ref 31.4–35)
MCV RBC AUTO: 85.9 FL (ref 82–102)
MONOCYTES # BLD: 0.4 K/UL (ref 0.1–1.3)
MONOCYTES NFR BLD: 5 % (ref 4–12)
NEUTS SEG # BLD: 6.6 K/UL (ref 1.7–8.2)
NEUTS SEG NFR BLD: 71 % (ref 43–78)
NRBC # BLD: 0 K/UL (ref 0–0.2)
PLATELET # BLD AUTO: 306 K/UL (ref 150–450)
PMV BLD AUTO: 10.2 FL (ref 9.4–12.3)
POTASSIUM SERPL-SCNC: 4.3 MMOL/L (ref 3.5–5.1)
PROT SERPL-MCNC: 7.2 G/DL (ref 6.3–8.2)
RBC # BLD AUTO: 4.75 M/UL (ref 4.05–5.2)
SODIUM SERPL-SCNC: 138 MMOL/L (ref 136–145)
TROPONIN T SERPL HS-MCNC: 22 NG/L (ref 0–14)
TROPONIN T SERPL HS-MCNC: 22 NG/L (ref 0–14)
WBC # BLD AUTO: 9.3 K/UL (ref 4.3–11.1)

## 2024-05-29 PROCEDURE — 93010 ELECTROCARDIOGRAM REPORT: CPT | Performed by: INTERNAL MEDICINE

## 2024-05-29 PROCEDURE — 99285 EMERGENCY DEPT VISIT HI MDM: CPT

## 2024-05-29 PROCEDURE — 84484 ASSAY OF TROPONIN QUANT: CPT

## 2024-05-29 PROCEDURE — 80053 COMPREHEN METABOLIC PANEL: CPT

## 2024-05-29 PROCEDURE — 93005 ELECTROCARDIOGRAM TRACING: CPT | Performed by: EMERGENCY MEDICINE

## 2024-05-29 PROCEDURE — 71045 X-RAY EXAM CHEST 1 VIEW: CPT

## 2024-05-29 PROCEDURE — 85025 COMPLETE CBC W/AUTO DIFF WBC: CPT

## 2024-05-29 RX ORDER — NITROGLYCERIN 0.4 MG/1
0.4 TABLET SUBLINGUAL EVERY 5 MIN PRN
Qty: 25 TABLET | Refills: 3 | Status: SHIPPED | OUTPATIENT
Start: 2024-05-29

## 2024-05-29 RX ORDER — OMEPRAZOLE 20 MG/1
20 CAPSULE, DELAYED RELEASE ORAL
Qty: 30 CAPSULE | Refills: 0 | Status: SHIPPED | OUTPATIENT
Start: 2024-05-29

## 2024-05-29 ASSESSMENT — ENCOUNTER SYMPTOMS
COUGH: 0
ABDOMINAL PAIN: 0
VOMITING: 0
SHORTNESS OF BREATH: 0

## 2024-05-29 ASSESSMENT — LIFESTYLE VARIABLES
HOW MANY STANDARD DRINKS CONTAINING ALCOHOL DO YOU HAVE ON A TYPICAL DAY: PATIENT DOES NOT DRINK
HOW OFTEN DO YOU HAVE A DRINK CONTAINING ALCOHOL: NEVER

## 2024-05-29 ASSESSMENT — PAIN - FUNCTIONAL ASSESSMENT: PAIN_FUNCTIONAL_ASSESSMENT: NONE - DENIES PAIN

## 2024-05-29 NOTE — DISCHARGE INSTRUCTIONS
No signs of heart attack.  Call cardiology office if you do not hear from them by noon tomorrow to see if follow-up appointment needs to be moved up.  Consider using nitroglycerin for chest discomfort that comes back on.  Recheck if 3 nitroglycerin do not help.  Also consider starting acid medication as well.  Also call your primary care doctor to schedule appointment. recheck sooner for worse or worrisome symptoms.

## 2024-05-29 NOTE — ED TRIAGE NOTES
Pt arrives to ed via ems from home. C/o chest pain since last night. Does have history of pacemaker. Pacemaker is 1.5 yr old. Medtronic

## 2024-05-29 NOTE — ED NOTES
Patient mobility status  with no difficulty. Provider aware     I have reviewed discharge instructions with the patient.  The patient verbalized understanding.    Patient left ED via Discharge Method: ambulatory to Home with Spouse.    Opportunity for questions and clarification provided.     Patient given 2 scripts.           Colette Bhatti, RN  05/29/24 8500

## 2024-05-31 NOTE — PROGRESS NOTES
[facility] CARDIOLOGY  [address] [place] [address], [address]  [place] [address]  PHONE: [phone]        NAME:  Constance Botello  : 1950  MRN: [id number]     PCP:  Janet Calderon MD      SUBJECTIVE:   Constance Botello is a 73 y.o. female seen for a follow up visit regarding the following:     Chief Complaint   Patient presents with    Atrial Fibrillation    Hypertension       HPI:    Recently admitted to [facility] with high-grade AV block and syncope with fall.  She had a dual-chamber MRI safe Medtronic pacemaker implantation with good result...  She is doing well from a pacemaker standpoint but has had labile blood pressure recently with uncontrolled blood pressure and substernal chest discomfort with negative ER evaluation recently.  She may have forgotten her nighttime BP meds but just is not sure.  Regardless, despite compliance with medications and a healthy diet, since the ER visit her systolics have been consistently elevated into the 140s to 160s.     Cannot tolerate statins, refuses, has tried numerous statins in the past, all with side effects.  Trying to watch diet, walking for exercise without any limitations or exertional symptoms.   earlier this year.  Familial most likely.  Willing to try Repatha, but insurance would not approve. Taking colestipol now, checking lipids soon with PCP...    Being evaluated by [facility] neurology and [facility] endovascular for bilateral vertebral artery stenosis, currently stable without any recent neurologic symptoms on Eliquis twice daily with good compliance.    Her BP has been previously well controlled and she reported a sudden loss of BP control with persistently severely elevated blood pressure despite compliance with medications.     Echocardiogram 10/9/2023:  Left Ventricle Hyperdynamic left ventricular systolic function with a visually estimated EF of 70 - 75%. Left ventricle size is normal. Normal wall thickness. Normal wall

## 2024-06-05 ENCOUNTER — OFFICE VISIT (OUTPATIENT)
Age: 74
End: 2024-06-05
Payer: MEDICARE

## 2024-06-05 VITALS
HEIGHT: 62 IN | SYSTOLIC BLOOD PRESSURE: 155 MMHG | HEART RATE: 87 BPM | WEIGHT: 130 LBS | BODY MASS INDEX: 23.92 KG/M2 | DIASTOLIC BLOOD PRESSURE: 85 MMHG

## 2024-06-05 DIAGNOSIS — I10 HYPERTENSION, UNCONTROLLED: ICD-10-CM

## 2024-06-05 DIAGNOSIS — I25.119 ATHEROSCLEROSIS OF NATIVE CORONARY ARTERY OF NATIVE HEART WITH ANGINA PECTORIS (HCC): ICD-10-CM

## 2024-06-05 DIAGNOSIS — I48.0 PAROXYSMAL ATRIAL FIBRILLATION (HCC): ICD-10-CM

## 2024-06-05 DIAGNOSIS — R00.1 SYMPTOMATIC BRADYCARDIA: ICD-10-CM

## 2024-06-05 DIAGNOSIS — R07.89 CHEST DISCOMFORT: Primary | ICD-10-CM

## 2024-06-05 DIAGNOSIS — Z95.0 PACEMAKER: ICD-10-CM

## 2024-06-05 PROCEDURE — G8420 CALC BMI NORM PARAMETERS: HCPCS | Performed by: INTERNAL MEDICINE

## 2024-06-05 PROCEDURE — G8427 DOCREV CUR MEDS BY ELIG CLIN: HCPCS | Performed by: INTERNAL MEDICINE

## 2024-06-05 PROCEDURE — 3017F COLORECTAL CA SCREEN DOC REV: CPT | Performed by: INTERNAL MEDICINE

## 2024-06-05 PROCEDURE — 3077F SYST BP >= 140 MM HG: CPT | Performed by: INTERNAL MEDICINE

## 2024-06-05 PROCEDURE — G8399 PT W/DXA RESULTS DOCUMENT: HCPCS | Performed by: INTERNAL MEDICINE

## 2024-06-05 PROCEDURE — 1090F PRES/ABSN URINE INCON ASSESS: CPT | Performed by: INTERNAL MEDICINE

## 2024-06-05 PROCEDURE — 1036F TOBACCO NON-USER: CPT | Performed by: INTERNAL MEDICINE

## 2024-06-05 PROCEDURE — 99214 OFFICE O/P EST MOD 30 MIN: CPT | Performed by: INTERNAL MEDICINE

## 2024-06-05 PROCEDURE — 93000 ELECTROCARDIOGRAM COMPLETE: CPT | Performed by: INTERNAL MEDICINE

## 2024-06-05 PROCEDURE — 3079F DIAST BP 80-89 MM HG: CPT | Performed by: INTERNAL MEDICINE

## 2024-06-05 PROCEDURE — 1123F ACP DISCUSS/DSCN MKR DOCD: CPT | Performed by: INTERNAL MEDICINE

## 2024-06-05 RX ORDER — SPIRONOLACTONE AND HYDROCHLOROTHIAZIDE 25; 25 MG/1; MG/1
1 TABLET ORAL DAILY
Qty: 90 TABLET | Refills: 3 | Status: SHIPPED | OUTPATIENT
Start: 2024-06-05

## 2024-06-17 ENCOUNTER — OFFICE VISIT (OUTPATIENT)
Dept: INTERNAL MEDICINE CLINIC | Facility: CLINIC | Age: 74
End: 2024-06-17
Payer: MEDICARE

## 2024-06-17 VITALS
WEIGHT: 134 LBS | HEART RATE: 88 BPM | BODY MASS INDEX: 24.66 KG/M2 | DIASTOLIC BLOOD PRESSURE: 82 MMHG | SYSTOLIC BLOOD PRESSURE: 188 MMHG | OXYGEN SATURATION: 94 % | HEIGHT: 62 IN

## 2024-06-17 DIAGNOSIS — E11.65 TYPE 2 DIABETES MELLITUS WITH HYPERGLYCEMIA, WITH LONG-TERM CURRENT USE OF INSULIN (HCC): ICD-10-CM

## 2024-06-17 DIAGNOSIS — Z79.4 TYPE 2 DIABETES MELLITUS WITH HYPERGLYCEMIA, WITH LONG-TERM CURRENT USE OF INSULIN (HCC): ICD-10-CM

## 2024-06-17 DIAGNOSIS — I10 HYPERTENSION, UNCONTROLLED: Primary | ICD-10-CM

## 2024-06-17 LAB
EST. AVERAGE GLUCOSE BLD GHB EST-MCNC: 221 MG/DL
HBA1C MFR BLD: 9.3 % (ref 0–5.6)

## 2024-06-17 PROCEDURE — 1090F PRES/ABSN URINE INCON ASSESS: CPT | Performed by: STUDENT IN AN ORGANIZED HEALTH CARE EDUCATION/TRAINING PROGRAM

## 2024-06-17 PROCEDURE — G8420 CALC BMI NORM PARAMETERS: HCPCS | Performed by: STUDENT IN AN ORGANIZED HEALTH CARE EDUCATION/TRAINING PROGRAM

## 2024-06-17 PROCEDURE — 1123F ACP DISCUSS/DSCN MKR DOCD: CPT | Performed by: STUDENT IN AN ORGANIZED HEALTH CARE EDUCATION/TRAINING PROGRAM

## 2024-06-17 PROCEDURE — 99214 OFFICE O/P EST MOD 30 MIN: CPT | Performed by: STUDENT IN AN ORGANIZED HEALTH CARE EDUCATION/TRAINING PROGRAM

## 2024-06-17 PROCEDURE — G8427 DOCREV CUR MEDS BY ELIG CLIN: HCPCS | Performed by: STUDENT IN AN ORGANIZED HEALTH CARE EDUCATION/TRAINING PROGRAM

## 2024-06-17 PROCEDURE — 3017F COLORECTAL CA SCREEN DOC REV: CPT | Performed by: STUDENT IN AN ORGANIZED HEALTH CARE EDUCATION/TRAINING PROGRAM

## 2024-06-17 PROCEDURE — 3077F SYST BP >= 140 MM HG: CPT | Performed by: STUDENT IN AN ORGANIZED HEALTH CARE EDUCATION/TRAINING PROGRAM

## 2024-06-17 PROCEDURE — 2022F DILAT RTA XM EVC RTNOPTHY: CPT | Performed by: STUDENT IN AN ORGANIZED HEALTH CARE EDUCATION/TRAINING PROGRAM

## 2024-06-17 PROCEDURE — 3046F HEMOGLOBIN A1C LEVEL >9.0%: CPT | Performed by: STUDENT IN AN ORGANIZED HEALTH CARE EDUCATION/TRAINING PROGRAM

## 2024-06-17 PROCEDURE — G8399 PT W/DXA RESULTS DOCUMENT: HCPCS | Performed by: STUDENT IN AN ORGANIZED HEALTH CARE EDUCATION/TRAINING PROGRAM

## 2024-06-17 PROCEDURE — 1036F TOBACCO NON-USER: CPT | Performed by: STUDENT IN AN ORGANIZED HEALTH CARE EDUCATION/TRAINING PROGRAM

## 2024-06-17 PROCEDURE — 3079F DIAST BP 80-89 MM HG: CPT | Performed by: STUDENT IN AN ORGANIZED HEALTH CARE EDUCATION/TRAINING PROGRAM

## 2024-06-17 RX ORDER — AMOXICILLIN 500 MG/1
500 CAPSULE ORAL 3 TIMES DAILY
COMMUNITY

## 2024-06-17 RX ORDER — ORAL SEMAGLUTIDE 3 MG/1
3 TABLET ORAL DAILY
Qty: 90 TABLET | Refills: 1 | Status: SHIPPED | OUTPATIENT
Start: 2024-06-17

## 2024-06-17 RX ORDER — AMLODIPINE BESYLATE 5 MG/1
5 TABLET ORAL NIGHTLY
Qty: 90 TABLET | Refills: 1
Start: 2024-06-17

## 2024-06-17 SDOH — ECONOMIC STABILITY: FOOD INSECURITY: WITHIN THE PAST 12 MONTHS, YOU WORRIED THAT YOUR FOOD WOULD RUN OUT BEFORE YOU GOT MONEY TO BUY MORE.: NEVER TRUE

## 2024-06-17 SDOH — ECONOMIC STABILITY: FOOD INSECURITY: WITHIN THE PAST 12 MONTHS, THE FOOD YOU BOUGHT JUST DIDN'T LAST AND YOU DIDN'T HAVE MONEY TO GET MORE.: NEVER TRUE

## 2024-06-17 SDOH — ECONOMIC STABILITY: INCOME INSECURITY: HOW HARD IS IT FOR YOU TO PAY FOR THE VERY BASICS LIKE FOOD, HOUSING, MEDICAL CARE, AND HEATING?: NOT HARD AT ALL

## 2024-06-17 ASSESSMENT — PATIENT HEALTH QUESTIONNAIRE - PHQ9
SUM OF ALL RESPONSES TO PHQ9 QUESTIONS 1 & 2: 0
SUM OF ALL RESPONSES TO PHQ QUESTIONS 1-9: 0
3. TROUBLE FALLING OR STAYING ASLEEP: NOT AT ALL
5. POOR APPETITE OR OVEREATING: NOT AT ALL
9. THOUGHTS THAT YOU WOULD BE BETTER OFF DEAD, OR OF HURTING YOURSELF: NOT AT ALL
8. MOVING OR SPEAKING SO SLOWLY THAT OTHER PEOPLE COULD HAVE NOTICED. OR THE OPPOSITE, BEING SO FIGETY OR RESTLESS THAT YOU HAVE BEEN MOVING AROUND A LOT MORE THAN USUAL: NOT AT ALL
1. LITTLE INTEREST OR PLEASURE IN DOING THINGS: NOT AT ALL
7. TROUBLE CONCENTRATING ON THINGS, SUCH AS READING THE NEWSPAPER OR WATCHING TELEVISION: NOT AT ALL
SUM OF ALL RESPONSES TO PHQ QUESTIONS 1-9: 0
2. FEELING DOWN, DEPRESSED OR HOPELESS: NOT AT ALL
4. FEELING TIRED OR HAVING LITTLE ENERGY: NOT AT ALL
6. FEELING BAD ABOUT YOURSELF - OR THAT YOU ARE A FAILURE OR HAVE LET YOURSELF OR YOUR FAMILY DOWN: NOT AT ALL
SUM OF ALL RESPONSES TO PHQ QUESTIONS 1-9: 0
SUM OF ALL RESPONSES TO PHQ QUESTIONS 1-9: 0
10. IF YOU CHECKED OFF ANY PROBLEMS, HOW DIFFICULT HAVE THESE PROBLEMS MADE IT FOR YOU TO DO YOUR WORK, TAKE CARE OF THINGS AT HOME, OR GET ALONG WITH OTHER PEOPLE: NOT DIFFICULT AT ALL

## 2024-06-17 NOTE — PROGRESS NOTES
FOLLOW UP VISIT    Subjective:    Constance Botello (: 1950) is a 73 y.o., female,   Chief Complaint   Patient presents with    Hypertension    Cholesterol Problem    Diabetes       HPI:    HTN: at home BP has been running 140s systolic, 150s if stressed, and sometimes into 130s. Brought her home BP meds although didn't bring in amlodipine takes this at night. Her cardiologist recently added aldactazide    Per patient sees an endocrinologist and last saw them this year, but doesn't recall what she sees them for or their name/location  Reports she doesn't forget to take the insulin    She didn't bring glucose log forgot to bring but checks at least once a day    No hx pancreatitis, no personal or Fhx thyroid cancer    The following portions of the patient's history were reviewed and updated as appropriate:      Current Outpatient Medications   Medication Sig Dispense Refill    amoxicillin (AMOXIL) 500 MG capsule Take 1 capsule by mouth 3 times daily      Apoaequorin (PREVAGEN PO) Take by mouth      amLODIPine (NORVASC) 5 MG tablet Take 1 tablet by mouth nightly 90 tablet 1    Semaglutide (RYBELSUS) 3 MG TABS Take 3 mg by mouth daily 90 tablet 1    spironolactone-hydroCHLOROthiazide (ALDACTAZIDE) 25-25 MG per tablet Take 1 tablet by mouth daily 90 tablet 3    nitroGLYCERIN (NITROSTAT) 0.4 MG SL tablet Place 1 tablet under the tongue every 5 minutes as needed for Chest pain up to max of 3 total doses. If no relief after 3 doses, recheck at emergency department 25 tablet 3    omeprazole (PRILOSEC) 20 MG delayed release capsule Take 1 capsule by mouth 2 times daily (before meals) 30 capsule 0    insulin glargine (LANTUS;BASAGLAR) 100 UNIT/ML injection pen Inject 44 Units into the skin nightly (Patient taking differently: Inject 42 Units into the skin nightly) 3 Adjustable Dose Pre-filled Pen Syringe 5    colestipol (COLESTID) 1 g tablet Take 1 tablet by mouth 2 times daily 180 tablet 3    apixaban (ELIQUIS) 5 MG

## 2024-06-18 NOTE — ASSESSMENT & PLAN NOTE
Likely component of white coat syndrome, reasonably well controlled at home, I discussed with her need to bring in all medications, may also be beneficial for her to write down all meds, doses she is taking

## 2024-07-31 RX ORDER — AMLODIPINE BESYLATE 10 MG/1
10 TABLET ORAL DAILY
Qty: 90 TABLET | Refills: 3 | Status: SHIPPED | OUTPATIENT
Start: 2024-07-31

## 2024-07-31 NOTE — TELEPHONE ENCOUNTER
Requested Prescriptions     Pending Prescriptions Disp Refills    amLODIPine (NORVASC) 10 MG tablet [Pharmacy Med Name: AMLODIPINE BESYLATE TABS 10MG] 90 tablet 3     Sig: Take 1 tablet by mouth daily       Verified rx. Last OV 06/05/24. Erx to pharm on file.

## 2024-08-30 ENCOUNTER — TELEPHONE (OUTPATIENT)
Age: 74
End: 2024-08-30

## 2024-08-30 NOTE — TELEPHONE ENCOUNTER
----- Message from Dr. Jono Pedroza MD sent at 8/30/2024  7:27 AM EDT -----  Her stress test is significantly abnormal.  I need to see her next week to discuss catheterization.  Overbook me as needed  ----- Message -----  From: Jono Pedroza MD  Sent: 8/30/2024   7:26 AM EDT  To: Jono Pedroza MD

## 2024-09-03 ENCOUNTER — OFFICE VISIT (OUTPATIENT)
Age: 74
End: 2024-09-03
Payer: MEDICARE

## 2024-09-03 VITALS
WEIGHT: 130.6 LBS | SYSTOLIC BLOOD PRESSURE: 144 MMHG | DIASTOLIC BLOOD PRESSURE: 80 MMHG | HEART RATE: 97 BPM | HEIGHT: 61 IN | BODY MASS INDEX: 24.66 KG/M2

## 2024-09-03 DIAGNOSIS — I25.119 ATHEROSCLEROSIS OF NATIVE CORONARY ARTERY OF NATIVE HEART WITH ANGINA PECTORIS (HCC): ICD-10-CM

## 2024-09-03 DIAGNOSIS — R00.1 SYMPTOMATIC BRADYCARDIA: ICD-10-CM

## 2024-09-03 DIAGNOSIS — R93.1 ABNORMAL NUCLEAR CARDIAC IMAGING TEST: Primary | ICD-10-CM

## 2024-09-03 DIAGNOSIS — E78.2 MIXED HYPERLIPIDEMIA: ICD-10-CM

## 2024-09-03 DIAGNOSIS — I48.0 PAROXYSMAL ATRIAL FIBRILLATION (HCC): ICD-10-CM

## 2024-09-03 DIAGNOSIS — G47.33 OSA (OBSTRUCTIVE SLEEP APNEA): ICD-10-CM

## 2024-09-03 DIAGNOSIS — Z86.73 HISTORY OF CVA (CEREBROVASCULAR ACCIDENT): ICD-10-CM

## 2024-09-03 DIAGNOSIS — R07.89 CHEST DISCOMFORT: ICD-10-CM

## 2024-09-03 DIAGNOSIS — Z95.0 PACEMAKER: ICD-10-CM

## 2024-09-03 PROBLEM — R07.9 CHEST PAIN: Status: ACTIVE | Noted: 2024-09-03

## 2024-09-03 PROCEDURE — G8427 DOCREV CUR MEDS BY ELIG CLIN: HCPCS | Performed by: INTERNAL MEDICINE

## 2024-09-03 PROCEDURE — 1090F PRES/ABSN URINE INCON ASSESS: CPT | Performed by: INTERNAL MEDICINE

## 2024-09-03 PROCEDURE — G8420 CALC BMI NORM PARAMETERS: HCPCS | Performed by: INTERNAL MEDICINE

## 2024-09-03 PROCEDURE — 1036F TOBACCO NON-USER: CPT | Performed by: INTERNAL MEDICINE

## 2024-09-03 PROCEDURE — 3077F SYST BP >= 140 MM HG: CPT | Performed by: INTERNAL MEDICINE

## 2024-09-03 PROCEDURE — 1123F ACP DISCUSS/DSCN MKR DOCD: CPT | Performed by: INTERNAL MEDICINE

## 2024-09-03 PROCEDURE — 3017F COLORECTAL CA SCREEN DOC REV: CPT | Performed by: INTERNAL MEDICINE

## 2024-09-03 PROCEDURE — G8399 PT W/DXA RESULTS DOCUMENT: HCPCS | Performed by: INTERNAL MEDICINE

## 2024-09-03 PROCEDURE — 3079F DIAST BP 80-89 MM HG: CPT | Performed by: INTERNAL MEDICINE

## 2024-09-03 PROCEDURE — 93000 ELECTROCARDIOGRAM COMPLETE: CPT | Performed by: INTERNAL MEDICINE

## 2024-09-03 PROCEDURE — 99214 OFFICE O/P EST MOD 30 MIN: CPT | Performed by: INTERNAL MEDICINE

## 2024-09-03 RX ORDER — DIPHENHYDRAMINE HYDROCHLORIDE 50 MG/ML
50 INJECTION INTRAMUSCULAR; INTRAVENOUS ONCE
OUTPATIENT
Start: 2024-09-03 | End: 2024-09-03

## 2024-09-03 RX ORDER — ASPIRIN 325 MG
325 TABLET ORAL ONCE
OUTPATIENT
Start: 2024-09-03 | End: 2024-09-03

## 2024-09-03 RX ORDER — SODIUM CHLORIDE 0.9 % (FLUSH) 0.9 %
5-40 SYRINGE (ML) INJECTION EVERY 12 HOURS SCHEDULED
OUTPATIENT
Start: 2024-09-03

## 2024-09-03 RX ORDER — ONDANSETRON 2 MG/ML
4 INJECTION INTRAMUSCULAR; INTRAVENOUS EVERY 6 HOURS PRN
OUTPATIENT
Start: 2024-09-03

## 2024-09-03 RX ORDER — SODIUM CHLORIDE 0.9 % (FLUSH) 0.9 %
5-40 SYRINGE (ML) INJECTION PRN
OUTPATIENT
Start: 2024-09-03

## 2024-09-03 RX ORDER — SODIUM CHLORIDE 9 MG/ML
INJECTION, SOLUTION INTRAVENOUS PRN
OUTPATIENT
Start: 2024-09-03

## 2024-09-03 RX ORDER — SODIUM CHLORIDE 9 MG/ML
INJECTION, SOLUTION INTRAVENOUS CONTINUOUS
OUTPATIENT
Start: 2024-09-03

## 2024-09-03 ASSESSMENT — ENCOUNTER SYMPTOMS
SHORTNESS OF BREATH: 0
CONSTIPATION: 0
ABDOMINAL DISTENTION: 0
DIARRHEA: 0
PHOTOPHOBIA: 0
SORE THROAT: 0
COUGH: 0
CHEST TIGHTNESS: 1
ABDOMINAL PAIN: 0

## 2024-09-03 NOTE — PROGRESS NOTES
Conclusion: There is no evidence of transient ischemic dilation (TID). TID ratio is 0.83.    Image quality is good.    ECG: Resting ECG demonstrates normal sinus rhythm.  Stress ECG without significant change.    Stress Test: A pharmacological stress test was performed using regadenoson (Lexiscan). Patient complained of dyspnea, lightheadedness, and headache/pressure around eyes. 50 mg of aminophylline given as a reversal agent at minute 4:30 of recovery for headache. PO caffeine also given as a reversal agent.       Past Medical History, Past Surgical History, Family history, Social History, and Medications were all reviewed with the patient today and updated as necessary.     Current Outpatient Medications   Medication Sig Dispense Refill    Evolocumab 140 MG/ML SOAJ Inject 140 mg into the skin every 14 days 2 Adjustable Dose Pre-filled Pen Syringe 11    amLODIPine (NORVASC) 10 MG tablet Take 1 tablet by mouth daily 90 tablet 3    Apoaequorin (PREVAGEN PO) Take by mouth      spironolactone-hydroCHLOROthiazide (ALDACTAZIDE) 25-25 MG per tablet Take 1 tablet by mouth daily 90 tablet 3    nitroGLYCERIN (NITROSTAT) 0.4 MG SL tablet Place 1 tablet under the tongue every 5 minutes as needed for Chest pain up to max of 3 total doses. If no relief after 3 doses, recheck at emergency department 25 tablet 3    omeprazole (PRILOSEC) 20 MG delayed release capsule Take 1 capsule by mouth 2 times daily (before meals) 30 capsule 0    insulin glargine (LANTUS;BASAGLAR) 100 UNIT/ML injection pen Inject 44 Units into the skin nightly (Patient taking differently: Inject 42 Units into the skin nightly) 3 Adjustable Dose Pre-filled Pen Syringe 5    colestipol (COLESTID) 1 g tablet Take 1 tablet by mouth 2 times daily 180 tablet 3    apixaban (ELIQUIS) 5 MG TABS tablet Take 1 tablet by mouth 2 times daily 180 tablet 3    NONFORMULARY Take 1 tablet by mouth daily as needed (hypertension) Healthy Blood Pressure Support- Naticol 1000

## 2024-09-04 ENCOUNTER — TELEPHONE (OUTPATIENT)
Age: 74
End: 2024-09-04

## 2024-09-04 DIAGNOSIS — R07.89 CHEST DISCOMFORT: ICD-10-CM

## 2024-09-04 DIAGNOSIS — I25.119 ATHEROSCLEROSIS OF NATIVE CORONARY ARTERY OF NATIVE HEART WITH ANGINA PECTORIS (HCC): ICD-10-CM

## 2024-09-04 DIAGNOSIS — R93.1 ABNORMAL NUCLEAR CARDIAC IMAGING TEST: ICD-10-CM

## 2024-09-04 LAB
ANION GAP SERPL CALC-SCNC: 13 MMOL/L (ref 9–18)
BASOPHILS # BLD: 0 K/UL (ref 0–0.2)
BASOPHILS NFR BLD: 0 % (ref 0–2)
BUN SERPL-MCNC: 25 MG/DL (ref 8–23)
CALCIUM SERPL-MCNC: 9.4 MG/DL (ref 8.8–10.2)
CHLORIDE SERPL-SCNC: 95 MMOL/L (ref 98–107)
CO2 SERPL-SCNC: 26 MMOL/L (ref 20–28)
CREAT SERPL-MCNC: 1.21 MG/DL (ref 0.6–1.1)
DIFFERENTIAL METHOD BLD: ABNORMAL
EOSINOPHIL # BLD: 0 K/UL (ref 0–0.8)
EOSINOPHIL NFR BLD: 0 % (ref 0.5–7.8)
ERYTHROCYTE [DISTWIDTH] IN BLOOD BY AUTOMATED COUNT: 12.6 % (ref 11.9–14.6)
GLUCOSE SERPL-MCNC: 248 MG/DL (ref 70–99)
HCT VFR BLD AUTO: 43.7 % (ref 35.8–46.3)
HGB BLD-MCNC: 14.3 G/DL (ref 11.7–15.4)
IMM GRANULOCYTES # BLD AUTO: 0 K/UL (ref 0–0.5)
IMM GRANULOCYTES NFR BLD AUTO: 0 % (ref 0–5)
LYMPHOCYTES # BLD: 1.3 K/UL (ref 0.5–4.6)
LYMPHOCYTES NFR BLD: 16 % (ref 13–44)
MCH RBC QN AUTO: 28.4 PG (ref 26.1–32.9)
MCHC RBC AUTO-ENTMCNC: 32.7 G/DL (ref 31.4–35)
MCV RBC AUTO: 86.7 FL (ref 82–102)
MONOCYTES # BLD: 0.4 K/UL (ref 0.1–1.3)
MONOCYTES NFR BLD: 5 % (ref 4–12)
NEUTS SEG # BLD: 6.2 K/UL (ref 1.7–8.2)
NEUTS SEG NFR BLD: 79 % (ref 43–78)
NRBC # BLD: 0 K/UL (ref 0–0.2)
PLATELET # BLD AUTO: 278 K/UL (ref 150–450)
PMV BLD AUTO: 10.5 FL (ref 9.4–12.3)
POTASSIUM SERPL-SCNC: 4.4 MMOL/L (ref 3.5–5.1)
RBC # BLD AUTO: 5.04 M/UL (ref 4.05–5.2)
SODIUM SERPL-SCNC: 134 MMOL/L (ref 136–145)
WBC # BLD AUTO: 8 K/UL (ref 4.3–11.1)

## 2024-09-04 NOTE — TELEPHONE ENCOUNTER
Pt is calling saying she got a call from [facility] about her Repatha and they are saying we need to call them asap before they can send out her medication,Please call [facility]-[phone]

## 2024-09-05 ENCOUNTER — TELEPHONE (OUTPATIENT)
Age: 74
End: 2024-09-05

## 2024-09-05 NOTE — TELEPHONE ENCOUNTER
Spoke with Tony with [facility] and she stated that a PA needs to be done.     Spoke with patient and informed her a PA is in the process of being done.

## 2024-09-09 ENCOUNTER — TELEPHONE (OUTPATIENT)
Age: 74
End: 2024-09-09

## 2024-09-19 ENCOUNTER — TELEPHONE (OUTPATIENT)
Age: 74
End: 2024-09-19

## 2024-09-23 ENCOUNTER — TELEPHONE (OUTPATIENT)
Age: 74
End: 2024-09-23

## 2024-09-25 NOTE — PROGRESS NOTES
Patient pre-assessment complete for Botello scheduled for [facility], arrival time 0900. Patient verified using . Patient instructed to bring a list of all home medications on the day of procedure. NPO status reinforced. Patient informed to take a full dose aspirin 325mg  or 81 mg x 4 on the day of procedure. Patient instructed to HOLD aldactone, eliquis, and take 1/2 dose of insulin the night before the procedure. Instructed they can take all other medications excluding vitamins & supplements. Patient verbalizes understanding of all instructions & denies any questions at this time.

## 2024-09-26 ENCOUNTER — HOSPITAL ENCOUNTER (OUTPATIENT)
Age: 74
Setting detail: OBSERVATION
Discharge: HOME | End: 2024-09-27
Attending: INTERNAL MEDICINE | Admitting: INTERNAL MEDICINE
Payer: MEDICARE

## 2024-09-26 DIAGNOSIS — I25.10 CAD S/P PERCUTANEOUS CORONARY ANGIOPLASTY: Primary | ICD-10-CM

## 2024-09-26 DIAGNOSIS — Z98.61 CAD S/P PERCUTANEOUS CORONARY ANGIOPLASTY: Primary | ICD-10-CM

## 2024-09-26 DIAGNOSIS — R07.9 CHEST PAIN: ICD-10-CM

## 2024-09-26 PROBLEM — R94.39 ABNORMAL NUCLEAR STRESS TEST: Status: ACTIVE | Noted: 2024-09-26

## 2024-09-26 LAB
ANION GAP SERPL CALC-SCNC: 14 MMOL/L (ref 9–18)
BUN SERPL-MCNC: 22 MG/DL (ref 8–23)
CALCIUM SERPL-MCNC: 9.8 MG/DL (ref 8.8–10.2)
CHLORIDE SERPL-SCNC: 99 MMOL/L (ref 98–107)
CO2 SERPL-SCNC: 25 MMOL/L (ref 20–28)
CREAT SERPL-MCNC: 1.16 MG/DL (ref 0.6–1.1)
ECHO BSA: 1.59 M2
EKG ATRIAL RATE: 81 BPM
EKG ATRIAL RATE: 85 BPM
EKG DIAGNOSIS: NORMAL
EKG DIAGNOSIS: NORMAL
EKG P AXIS: 44 DEGREES
EKG P AXIS: 46 DEGREES
EKG P-R INTERVAL: 172 MS
EKG P-R INTERVAL: 190 MS
EKG Q-T INTERVAL: 382 MS
EKG Q-T INTERVAL: 388 MS
EKG QRS DURATION: 74 MS
EKG QRS DURATION: 74 MS
EKG QTC CALCULATION (BAZETT): 443 MS
EKG QTC CALCULATION (BAZETT): 461 MS
EKG R AXIS: -1 DEGREES
EKG R AXIS: 3 DEGREES
EKG T AXIS: 25 DEGREES
EKG T AXIS: 38 DEGREES
EKG VENTRICULAR RATE: 81 BPM
EKG VENTRICULAR RATE: 85 BPM
ERYTHROCYTE [DISTWIDTH] IN BLOOD BY AUTOMATED COUNT: 12.7 % (ref 11.9–14.6)
GLUCOSE BLD STRIP.AUTO-MCNC: 195 MG/DL (ref 65–100)
GLUCOSE SERPL-MCNC: 248 MG/DL (ref 70–99)
HCT VFR BLD AUTO: 43 % (ref 35.8–46.3)
HGB BLD-MCNC: 14 G/DL (ref 11.7–15.4)
MAGNESIUM SERPL-MCNC: 2.2 MG/DL (ref 1.8–2.4)
MCH RBC QN AUTO: 28.2 PG (ref 26.1–32.9)
MCHC RBC AUTO-ENTMCNC: 32.6 G/DL (ref 31.4–35)
MCV RBC AUTO: 86.5 FL (ref 82–102)
NRBC # BLD: 0 K/UL (ref 0–0.2)
PLATELET # BLD AUTO: 375 K/UL (ref 150–450)
PMV BLD AUTO: 9.8 FL (ref 9.4–12.3)
POTASSIUM SERPL-SCNC: 4 MMOL/L (ref 3.5–5.1)
RBC # BLD AUTO: 4.97 M/UL (ref 4.05–5.2)
SERVICE CMNT-IMP: ABNORMAL
SODIUM SERPL-SCNC: 137 MMOL/L (ref 136–145)
WBC # BLD AUTO: 11.8 K/UL (ref 4.3–11.1)

## 2024-09-26 PROCEDURE — 2580000003 HC RX 258: Performed by: INTERNAL MEDICINE

## 2024-09-26 PROCEDURE — G0378 HOSPITAL OBSERVATION PER HR: HCPCS

## 2024-09-26 PROCEDURE — 80048 BASIC METABOLIC PNL TOTAL CA: CPT

## 2024-09-26 PROCEDURE — C1725 CATH, TRANSLUMIN NON-LASER: HCPCS | Performed by: INTERNAL MEDICINE

## 2024-09-26 PROCEDURE — 99153 MOD SED SAME PHYS/QHP EA: CPT | Performed by: INTERNAL MEDICINE

## 2024-09-26 PROCEDURE — 6370000000 HC RX 637 (ALT 250 FOR IP): Performed by: INTERNAL MEDICINE

## 2024-09-26 PROCEDURE — 83735 ASSAY OF MAGNESIUM: CPT

## 2024-09-26 PROCEDURE — C1887 CATHETER, GUIDING: HCPCS | Performed by: INTERNAL MEDICINE

## 2024-09-26 PROCEDURE — 6360000002 HC RX W HCPCS: Performed by: INTERNAL MEDICINE

## 2024-09-26 PROCEDURE — 82962 GLUCOSE BLOOD TEST: CPT

## 2024-09-26 PROCEDURE — 93005 ELECTROCARDIOGRAM TRACING: CPT | Performed by: INTERNAL MEDICINE

## 2024-09-26 PROCEDURE — 93458 L HRT ARTERY/VENTRICLE ANGIO: CPT | Performed by: INTERNAL MEDICINE

## 2024-09-26 PROCEDURE — 2500000003 HC RX 250 WO HCPCS: Performed by: INTERNAL MEDICINE

## 2024-09-26 PROCEDURE — 93010 ELECTROCARDIOGRAM REPORT: CPT | Performed by: INTERNAL MEDICINE

## 2024-09-26 PROCEDURE — C1894 INTRO/SHEATH, NON-LASER: HCPCS | Performed by: INTERNAL MEDICINE

## 2024-09-26 PROCEDURE — 99152 MOD SED SAME PHYS/QHP 5/>YRS: CPT | Performed by: INTERNAL MEDICINE

## 2024-09-26 PROCEDURE — C9600 PERC DRUG-EL COR STENT SING: HCPCS | Performed by: INTERNAL MEDICINE

## 2024-09-26 PROCEDURE — 92928 PRQ TCAT PLMT NTRAC ST 1 LES: CPT | Performed by: INTERNAL MEDICINE

## 2024-09-26 PROCEDURE — 6360000004 HC RX CONTRAST MEDICATION: Performed by: INTERNAL MEDICINE

## 2024-09-26 PROCEDURE — C1769 GUIDE WIRE: HCPCS | Performed by: INTERNAL MEDICINE

## 2024-09-26 PROCEDURE — 2709999900 HC NON-CHARGEABLE SUPPLY: Performed by: INTERNAL MEDICINE

## 2024-09-26 PROCEDURE — C1874 STENT, COATED/COV W/DEL SYS: HCPCS | Performed by: INTERNAL MEDICINE

## 2024-09-26 PROCEDURE — 85027 COMPLETE CBC AUTOMATED: CPT

## 2024-09-26 PROCEDURE — 6370000000 HC RX 637 (ALT 250 FOR IP): Performed by: PHYSICIAN ASSISTANT

## 2024-09-26 DEVICE — STENT ONYXNG25018UX ONYX 2.50X18RX
Type: IMPLANTABLE DEVICE | Status: FUNCTIONAL
Brand: ONYX FRONTIER™

## 2024-09-26 DEVICE — STENT ONYXNG30018UX ONYX 3.00X18RX
Type: IMPLANTABLE DEVICE | Status: FUNCTIONAL
Brand: ONYX FRONTIER™

## 2024-09-26 RX ORDER — SODIUM CHLORIDE 9 MG/ML
INJECTION, SOLUTION INTRAVENOUS PRN
Status: DISCONTINUED | OUTPATIENT
Start: 2024-09-26 | End: 2024-09-27 | Stop reason: HOSPADM

## 2024-09-26 RX ORDER — ONDANSETRON 2 MG/ML
4 INJECTION INTRAMUSCULAR; INTRAVENOUS EVERY 6 HOURS PRN
Status: DISCONTINUED | OUTPATIENT
Start: 2024-09-26 | End: 2024-09-26 | Stop reason: HOSPADM

## 2024-09-26 RX ORDER — INSULIN LISPRO 100 [IU]/ML
0-4 INJECTION, SOLUTION INTRAVENOUS; SUBCUTANEOUS
Status: DISCONTINUED | OUTPATIENT
Start: 2024-09-27 | End: 2024-09-27 | Stop reason: HOSPADM

## 2024-09-26 RX ORDER — SODIUM CHLORIDE 0.9 % (FLUSH) 0.9 %
5-40 SYRINGE (ML) INJECTION PRN
Status: DISCONTINUED | OUTPATIENT
Start: 2024-09-26 | End: 2024-09-27 | Stop reason: HOSPADM

## 2024-09-26 RX ORDER — SODIUM CHLORIDE 9 MG/ML
INJECTION, SOLUTION INTRAVENOUS CONTINUOUS
Status: DISCONTINUED | OUTPATIENT
Start: 2024-09-26 | End: 2024-09-26 | Stop reason: HOSPADM

## 2024-09-26 RX ORDER — AMLODIPINE BESYLATE 10 MG/1
10 TABLET ORAL DAILY
Status: DISCONTINUED | OUTPATIENT
Start: 2024-09-27 | End: 2024-09-27 | Stop reason: HOSPADM

## 2024-09-26 RX ORDER — ASPIRIN 81 MG/1
81 TABLET ORAL DAILY
Status: DISCONTINUED | OUTPATIENT
Start: 2024-09-27 | End: 2024-09-27 | Stop reason: HOSPADM

## 2024-09-26 RX ORDER — THYROID 60 MG/1
30 TABLET ORAL DAILY
Status: DISCONTINUED | OUTPATIENT
Start: 2024-09-27 | End: 2024-09-27 | Stop reason: HOSPADM

## 2024-09-26 RX ORDER — SODIUM CHLORIDE 9 MG/ML
INJECTION, SOLUTION INTRAVENOUS PRN
Status: DISCONTINUED | OUTPATIENT
Start: 2024-09-26 | End: 2024-09-26 | Stop reason: HOSPADM

## 2024-09-26 RX ORDER — ASPIRIN 325 MG
325 TABLET ORAL ONCE
Status: DISCONTINUED | OUTPATIENT
Start: 2024-09-26 | End: 2024-09-26 | Stop reason: HOSPADM

## 2024-09-26 RX ORDER — HEPARIN SODIUM 200 [USP'U]/100ML
INJECTION, SOLUTION INTRAVENOUS CONTINUOUS PRN
Status: DISCONTINUED | OUTPATIENT
Start: 2024-09-26 | End: 2024-09-26 | Stop reason: HOSPADM

## 2024-09-26 RX ORDER — INSULIN GLARGINE 100 [IU]/ML
21 INJECTION, SOLUTION SUBCUTANEOUS ONCE
Status: COMPLETED | OUTPATIENT
Start: 2024-09-26 | End: 2024-09-26

## 2024-09-26 RX ORDER — INSULIN LISPRO 100 [IU]/ML
0-4 INJECTION, SOLUTION INTRAVENOUS; SUBCUTANEOUS NIGHTLY
Status: DISCONTINUED | OUTPATIENT
Start: 2024-09-26 | End: 2024-09-27 | Stop reason: HOSPADM

## 2024-09-26 RX ORDER — ATORVASTATIN CALCIUM 40 MG/1
40 TABLET, FILM COATED ORAL NIGHTLY
Status: DISCONTINUED | OUTPATIENT
Start: 2024-09-26 | End: 2024-09-27 | Stop reason: HOSPADM

## 2024-09-26 RX ORDER — ACETAMINOPHEN 325 MG/1
650 TABLET ORAL EVERY 4 HOURS PRN
Status: DISCONTINUED | OUTPATIENT
Start: 2024-09-26 | End: 2024-09-27 | Stop reason: HOSPADM

## 2024-09-26 RX ORDER — IOPAMIDOL 755 MG/ML
INJECTION, SOLUTION INTRAVASCULAR PRN
Status: DISCONTINUED | OUTPATIENT
Start: 2024-09-26 | End: 2024-09-26 | Stop reason: HOSPADM

## 2024-09-26 RX ORDER — LANOLIN ALCOHOL/MO/W.PET/CERES
3 CREAM (GRAM) TOPICAL NIGHTLY PRN
Status: DISCONTINUED | OUTPATIENT
Start: 2024-09-26 | End: 2024-09-27 | Stop reason: HOSPADM

## 2024-09-26 RX ORDER — MIDAZOLAM HYDROCHLORIDE 1 MG/ML
INJECTION INTRAMUSCULAR; INTRAVENOUS PRN
Status: DISCONTINUED | OUTPATIENT
Start: 2024-09-26 | End: 2024-09-26 | Stop reason: HOSPADM

## 2024-09-26 RX ORDER — SODIUM CHLORIDE 0.9 % (FLUSH) 0.9 %
5-40 SYRINGE (ML) INJECTION PRN
Status: DISCONTINUED | OUTPATIENT
Start: 2024-09-26 | End: 2024-09-26 | Stop reason: HOSPADM

## 2024-09-26 RX ORDER — BIVALIRUDIN 250 MG/5ML
INJECTION, POWDER, LYOPHILIZED, FOR SOLUTION INTRAVENOUS PRN
Status: DISCONTINUED | OUTPATIENT
Start: 2024-09-26 | End: 2024-09-26 | Stop reason: HOSPADM

## 2024-09-26 RX ORDER — CHOLESTYRAMINE LIGHT 4 G/5.7G
4 POWDER, FOR SUSPENSION ORAL DAILY
Status: DISCONTINUED | OUTPATIENT
Start: 2024-09-26 | End: 2024-09-27 | Stop reason: HOSPADM

## 2024-09-26 RX ORDER — SODIUM CHLORIDE 9 MG/ML
INJECTION, SOLUTION INTRAVENOUS CONTINUOUS
Status: ACTIVE | OUTPATIENT
Start: 2024-09-26 | End: 2024-09-27

## 2024-09-26 RX ORDER — LOSARTAN POTASSIUM 50 MG/1
100 TABLET ORAL DAILY
Status: DISCONTINUED | OUTPATIENT
Start: 2024-09-27 | End: 2024-09-27 | Stop reason: HOSPADM

## 2024-09-26 RX ORDER — SODIUM CHLORIDE 0.9 % (FLUSH) 0.9 %
5-40 SYRINGE (ML) INJECTION EVERY 12 HOURS SCHEDULED
Status: DISCONTINUED | OUTPATIENT
Start: 2024-09-26 | End: 2024-09-27 | Stop reason: HOSPADM

## 2024-09-26 RX ORDER — CLOPIDOGREL BISULFATE 75 MG/1
TABLET ORAL PRN
Status: DISCONTINUED | OUTPATIENT
Start: 2024-09-26 | End: 2024-09-26 | Stop reason: HOSPADM

## 2024-09-26 RX ORDER — LIDOCAINE HYDROCHLORIDE 10 MG/ML
INJECTION, SOLUTION INFILTRATION; PERINEURAL PRN
Status: DISCONTINUED | OUTPATIENT
Start: 2024-09-26 | End: 2024-09-26 | Stop reason: HOSPADM

## 2024-09-26 RX ORDER — SODIUM CHLORIDE 0.9 % (FLUSH) 0.9 %
5-40 SYRINGE (ML) INJECTION EVERY 12 HOURS SCHEDULED
Status: DISCONTINUED | OUTPATIENT
Start: 2024-09-26 | End: 2024-09-26 | Stop reason: HOSPADM

## 2024-09-26 RX ORDER — CLOPIDOGREL BISULFATE 75 MG/1
75 TABLET ORAL DAILY
Status: DISCONTINUED | OUTPATIENT
Start: 2024-09-27 | End: 2024-09-27 | Stop reason: HOSPADM

## 2024-09-26 RX ORDER — METOPROLOL SUCCINATE 100 MG/1
50 TABLET, EXTENDED RELEASE ORAL DAILY
Status: DISCONTINUED | OUTPATIENT
Start: 2024-09-27 | End: 2024-09-27 | Stop reason: HOSPADM

## 2024-09-26 RX ORDER — MAGNESIUM HYDROXIDE/ALUMINUM HYDROXICE/SIMETHICONE 120; 1200; 1200 MG/30ML; MG/30ML; MG/30ML
SUSPENSION ORAL PRN
Status: DISCONTINUED | OUTPATIENT
Start: 2024-09-26 | End: 2024-09-26 | Stop reason: HOSPADM

## 2024-09-26 RX ORDER — DIPHENHYDRAMINE HYDROCHLORIDE 50 MG/ML
50 INJECTION INTRAMUSCULAR; INTRAVENOUS ONCE
Status: DISCONTINUED | OUTPATIENT
Start: 2024-09-26 | End: 2024-09-26 | Stop reason: HOSPADM

## 2024-09-26 RX ORDER — NITROGLYCERIN 20 MG/100ML
INJECTION INTRAVENOUS PRN
Status: DISCONTINUED | OUTPATIENT
Start: 2024-09-26 | End: 2024-09-26 | Stop reason: HOSPADM

## 2024-09-26 RX ADMIN — SODIUM CHLORIDE: 9 INJECTION, SOLUTION INTRAVENOUS at 08:46

## 2024-09-26 RX ADMIN — ATORVASTATIN CALCIUM 40 MG: 40 TABLET, FILM COATED ORAL at 22:00

## 2024-09-26 RX ADMIN — SODIUM CHLORIDE, PRESERVATIVE FREE 10 ML: 5 INJECTION INTRAVENOUS at 22:01

## 2024-09-26 RX ADMIN — CHOLESTYRAMINE 4 G: 4 POWDER, FOR SUSPENSION ORAL at 14:36

## 2024-09-26 RX ADMIN — APIXABAN 5 MG: 5 TABLET, FILM COATED ORAL at 22:00

## 2024-09-26 RX ADMIN — INSULIN GLARGINE 21 UNITS: 100 INJECTION, SOLUTION SUBCUTANEOUS at 22:00

## 2024-09-26 RX ADMIN — SODIUM CHLORIDE: 9 INJECTION, SOLUTION INTRAVENOUS at 14:39

## 2024-09-26 NOTE — PROGRESS NOTES
Patient received to CPRU room # 10  Ambulatory from [facility]. Patient scheduled for [facility] today with Dr Pedroza. Procedure reviewed & questions answered, voiced good understanding consent obtained & placed on chart. All medications and medical history reviewed. Will prep patient per orders. Patient & family updated on plan of care.      The patient has a fraility score of 3-MANAGING WELL, based on patient A&Ox3, patient able to ambulate to room without difficulty.

## 2024-09-26 NOTE — PROGRESS NOTES
TRANSFER - OUT REPORT:    Verbal report given to RN(name) on Constance Botello  being transferred to [facility](unit) for routine post-op       Report consisted of patient’s Situation, Background, Assessment and   Recommendations(SBAR).     Information from the following report(s) Surgery Report was reviewed with the receiving nurse.    Opportunity for questions and clarification was provided.      Patient transported with:   Registered Nurse

## 2024-09-26 NOTE — PROGRESS NOTES
4 Eyes Skin Assessment     NAME:  Constance Botello  YOB: 1950  MEDICAL RECORD NUMBER:  [id number]    The patient is being assessed for  Admission    I agree that at least one RN has performed a thorough Head to Toe Skin Assessment on the patient. ALL assessment sites listed below have been assessed.      Areas assessed by both nurses:    Head, Face, Ears, Shoulders, Back, Chest, Arms, Elbows, Hands, Sacrum. Buttock, Coccyx, Ischium, and Legs. Feet and Heels        Does the Patient have a Wound? No noted wound(s)       Virgil Prevention initiated by RN: No  Wound Care Orders initiated by RN: No    Pressure Injury (Stage 3,4, Unstageable, DTI, NWPT, and Complex wounds) if present, place Wound referral order by RN under : No    New Ostomies, if present place, Ostomy referral order under : No     Nurse 1 eSignature: Electronically signed by Tanner Ramirez RN on 9/26/24 at 2:01 PM EDT    **SHARE this note so that the co-signing nurse can place an eSignature**    Nurse 2 eSignature: Electronically signed by Candis Escoto RN on 9/26/24 at 2:14 PM EDT

## 2024-09-26 NOTE — CARE COORDINATION
Pt presented for scheduled LHC today with Dr Pedroza. PTA, pt indep with her ADLs. A/O x4. Lives with her spouse. Drives. On RA. Denies DME needs or recent h/o falls. Denies current home care services. PCP established. [place] verified and able to afford home meds. No anticipated discharge needs identified by CM. Will remain available.       09/26/24 1391   Service Assessment   Patient Orientation Alert and Oriented;Person;Place;Situation;Self   Cognition Alert   History Provided By Patient   Primary Caregiver Self   Support Systems Spouse/Significant Other;Children;Family Members;[place] Community;Friends/Neighbors   Patient's Healthcare Decision Maker is: Legal Next of Kin   PCP Verified by CM Yes   Prior Functional Level Independent in ADLs/IADLs   Current Functional Level Independent in ADLs/IADLs   Can patient return to prior living arrangement Yes   Ability to make needs known: Good   Family able to assist with home care needs: Yes   Would you like for me to discuss the discharge plan with any other family members/significant others, and if so, who? No   Financial Resources Medicare   Community Resources None   Social/Functional History   Lives With Spouse   Type of Home House   Bathroom Toilet Standard   Bathroom Accessibility Accessible   Home Equipment None   Receives Help From Family   ADL Assistance Independent   Homemaking Assistance Independent   Ambulation Assistance Independent   Transfer Assistance Independent   Active  Yes   Occupation Retired   Discharge Planning   Type of Residence House   Living Arrangements Spouse/Significant Other   Current Services Prior To Admission None   Potential Assistance Needed N/A   DME Ordered? No   Potential Assistance Purchasing Medications No   Type of Home Care Services None   Services At/After Discharge   Transition of Care Consult (CM Consult) Discharge Planning   [place] Resource Information Provided? No   Mode of Transport at Discharge Other

## 2024-09-26 NOTE — PROGRESS NOTES
TRANSFER - IN REPORT:    Verbal report received from ARNOLD Garcia on Constance Botello being received from Cath Lab for routine progression of care.     Report consisted of patient’s Situation, Background, Assessment and Recommendations(SBAR).     Information from the following report(s) SBAR, Procedure Summary, MAR, and Cardiac Rhythm NSR  was reviewed. Opportunity for questions and clarification was provided.      Assessment completed upon patient’s arrival to unit and care assumed.     Patient received to room 410. Patient connected to monitor and assessment completed. Plan of care reviewed. Patient oriented to room and call light. Patient aware to use call light to communicate any chest pain or needs.

## 2024-09-26 NOTE — PROGRESS NOTES
PCI/LHC w/ Dr. Pedroza  1 stent to mid LAD 1 stent to prox cx  R radial access  TR band to R radial @ 14 mL  No s/sxs of bleeding or hematoma to R radial access site    Heparin 2000 units  Versed 1 mg  Fentanyl 25 mcg  Angiomax bolus and gtt to infuse until 12:50   @ 11:28  Plavix 600 mg  Maalox 30 ml    Report given to ARNOLD Garcia in CPRU

## 2024-09-26 NOTE — PROGRESS NOTES
Multivessel disease status post PCI to the proximal to mid LAD and proximal to mid circumflex.  Already on Eliquis which she needs to continue at home as an outpatient.  Use aspirin, Plavix, and Eliquis for 1 week and then stop aspirin.  Continue Plavix and Eliquis until follow-up with me in a couple of weeks.  Okay to overbook me.    Kasia

## 2024-09-27 VITALS
HEART RATE: 83 BPM | TEMPERATURE: 98.4 F | OXYGEN SATURATION: 92 % | HEIGHT: 61 IN | RESPIRATION RATE: 18 BRPM | BODY MASS INDEX: 23.86 KG/M2 | SYSTOLIC BLOOD PRESSURE: 147 MMHG | WEIGHT: 126.4 LBS | DIASTOLIC BLOOD PRESSURE: 73 MMHG

## 2024-09-27 PROBLEM — R94.39 ABNORMAL NUCLEAR STRESS TEST: Status: RESOLVED | Noted: 2024-09-26 | Resolved: 2024-09-27

## 2024-09-27 PROBLEM — R07.9 CHEST PAIN: Status: RESOLVED | Noted: 2024-09-03 | Resolved: 2024-09-27

## 2024-09-27 LAB
ANION GAP SERPL CALC-SCNC: 10 MMOL/L (ref 9–18)
BUN SERPL-MCNC: 13 MG/DL (ref 8–23)
CALCIUM SERPL-MCNC: 8.5 MG/DL (ref 8.8–10.2)
CHLORIDE SERPL-SCNC: 106 MMOL/L (ref 98–107)
CO2 SERPL-SCNC: 22 MMOL/L (ref 20–28)
CREAT SERPL-MCNC: 0.89 MG/DL (ref 0.6–1.1)
ERYTHROCYTE [DISTWIDTH] IN BLOOD BY AUTOMATED COUNT: 12.7 % (ref 11.9–14.6)
GLUCOSE BLD STRIP.AUTO-MCNC: 118 MG/DL (ref 65–100)
GLUCOSE BLD STRIP.AUTO-MCNC: 123 MG/DL (ref 65–100)
GLUCOSE BLD STRIP.AUTO-MCNC: 231 MG/DL (ref 65–100)
GLUCOSE SERPL-MCNC: 116 MG/DL (ref 70–99)
HCT VFR BLD AUTO: 36.2 % (ref 35.8–46.3)
HGB BLD-MCNC: 12.1 G/DL (ref 11.7–15.4)
MAGNESIUM SERPL-MCNC: 2.3 MG/DL (ref 1.8–2.4)
MCH RBC QN AUTO: 28.2 PG (ref 26.1–32.9)
MCHC RBC AUTO-ENTMCNC: 33.4 G/DL (ref 31.4–35)
MCV RBC AUTO: 84.4 FL (ref 82–102)
NRBC # BLD: 0 K/UL (ref 0–0.2)
PLATELET # BLD AUTO: 273 K/UL (ref 150–450)
PMV BLD AUTO: 9.6 FL (ref 9.4–12.3)
POTASSIUM SERPL-SCNC: 3.8 MMOL/L (ref 3.5–5.1)
RBC # BLD AUTO: 4.29 M/UL (ref 4.05–5.2)
SERVICE CMNT-IMP: ABNORMAL
SODIUM SERPL-SCNC: 138 MMOL/L (ref 136–145)
WBC # BLD AUTO: 8.8 K/UL (ref 4.3–11.1)

## 2024-09-27 PROCEDURE — G0378 HOSPITAL OBSERVATION PER HR: HCPCS

## 2024-09-27 PROCEDURE — 99232 SBSQ HOSP IP/OBS MODERATE 35: CPT | Performed by: INTERNAL MEDICINE

## 2024-09-27 PROCEDURE — 82962 GLUCOSE BLOOD TEST: CPT

## 2024-09-27 PROCEDURE — 80048 BASIC METABOLIC PNL TOTAL CA: CPT

## 2024-09-27 PROCEDURE — 6370000000 HC RX 637 (ALT 250 FOR IP): Performed by: INTERNAL MEDICINE

## 2024-09-27 PROCEDURE — 36415 COLL VENOUS BLD VENIPUNCTURE: CPT

## 2024-09-27 PROCEDURE — 2580000003 HC RX 258: Performed by: INTERNAL MEDICINE

## 2024-09-27 PROCEDURE — 85027 COMPLETE CBC AUTOMATED: CPT

## 2024-09-27 PROCEDURE — 83735 ASSAY OF MAGNESIUM: CPT

## 2024-09-27 RX ORDER — CLOPIDOGREL BISULFATE 75 MG/1
75 TABLET ORAL DAILY
Qty: 30 TABLET | Refills: 3 | Status: SHIPPED | OUTPATIENT
Start: 2024-09-27

## 2024-09-27 RX ADMIN — CHOLESTYRAMINE 4 G: 4 POWDER, FOR SUSPENSION ORAL at 09:35

## 2024-09-27 RX ADMIN — METOPROLOL SUCCINATE 50 MG: 100 TABLET, EXTENDED RELEASE ORAL at 09:51

## 2024-09-27 RX ADMIN — AMLODIPINE BESYLATE 10 MG: 10 TABLET ORAL at 09:51

## 2024-09-27 RX ADMIN — CLOPIDOGREL BISULFATE 75 MG: 75 TABLET ORAL at 09:32

## 2024-09-27 RX ADMIN — ASPIRIN 81 MG: 81 TABLET, COATED ORAL at 09:34

## 2024-09-27 RX ADMIN — LOSARTAN POTASSIUM 100 MG: 50 TABLET, FILM COATED ORAL at 09:51

## 2024-09-27 RX ADMIN — SODIUM CHLORIDE, PRESERVATIVE FREE 10 ML: 5 INJECTION INTRAVENOUS at 09:47

## 2024-09-27 RX ADMIN — LEVOTHYROXINE, LIOTHYRONINE 30 MG: 38; 9 TABLET ORAL at 09:31

## 2024-09-27 RX ADMIN — APIXABAN 5 MG: 5 TABLET, FILM COATED ORAL at 09:34

## 2024-09-27 NOTE — DISCHARGE SUMMARY
tablet by mouth daily     apixaban 5 MG Tabs tablet  Commonly known as: ELIQUIS  Take 1 tablet by mouth 2 times daily     atorvastatin 10 MG tablet  Commonly known as: LIPITOR  Take 1 tablet by mouth at bedtime     calcium carbonate 500 MG Tabs tablet  Commonly known as: OSCAL     colestipol 1 g tablet  Commonly known as: COLESTID  Take 1 tablet by mouth 2 times daily     CoQ10 100 MG Caps     Evolocumab 140 MG/ML Soaj  Inject 140 mg into the skin every 14 days     FLAXSEED OIL PO     fluticasone 50 MCG/ACT nasal spray  Commonly known as: FLONASE     insulin glargine 100 UNIT/ML injection pen  Commonly known as: LANTUS;BASAGLAR  Inject 44 Units into the skin nightly     losartan 100 MG tablet  Commonly known as: Cozaar  Take 1 tablet by mouth daily     metoprolol succinate 50 MG extended release tablet  Commonly known as: TOPROL XL  Take 1 tablet by mouth daily     Neuriva Caps  Take 1 each by mouth daily     nitroGLYCERIN 0.4 MG SL tablet  Commonly known as: Nitrostat  Place 1 tablet under the tongue every 5 minutes as needed for Chest pain up to max of 3 total doses. If no relief after 3 doses, recheck at emergency department     NONFORMULARY     omeprazole 20 MG delayed release capsule  Commonly known as: PRILOSEC  Take 1 capsule by mouth 2 times daily (before meals)     PREVAGEN PO     spironolactone-hydroCHLOROthiazide 25-25 MG per tablet  Commonly known as: Aldactazide  Take 1 tablet by mouth daily     thyroid 30 MG tablet  Commonly known as: ARMOUR  Take 1 tablet by mouth daily     UltiCare Micro Pen Needles 32G X 4 MM Misc  Generic drug: Insulin Pen Needle     WOMENS MULTIVITAMIN PO               Where to Get Your Medications        These medications were sent to [id number] [place]  [place] - [address] -  [phone] - F [phone] [address] [address]      Phone: [phone]   clopidogrel 75 MG tablet             Signed:  SUZANNE Pollock -  CNP-C  9/27/2024  8:08 AM

## 2024-09-27 NOTE — PROGRESS NOTES
am  9/27/2024 9:26 AM    Admit Date: 9/26/2024    Admit Diagnosis: Chest pain [R07.9]  CAD S/P percutaneous coronary angioplasty [I25.10, Z98.61]  Abnormal nuclear stress test [R94.39]      Subjective:    Patient : laying in bed comfortably, requesting DC home. No complaints, questions, or concerns at this time.    Objective:    BP (!) 150/76   Pulse 85   Temp 98.4 °F (36.9 °C) (Oral)   Resp 18   Ht 1.549 m (5' 1\")   Wt 57.3 kg (126 lb 6.4 oz)   SpO2 92%   BMI 23.88 kg/m²     ROS:  General ROS: negative for - chills  Hematological and Lymphatic ROS: negative for - blood clots or jaundice  Respiratory ROS: no cough, shortness of breath, or wheezing  Cardiovascular ROS: no chest pain or dyspnea on exertion  Gastrointestinal ROS: no abdominal pain, change in bowel habits, or black or bloody stools  Neurological ROS: no TIA or stroke symptoms    Physical Exam:      Physical Examination: General appearance - Appearance: alert, well appearing, and in no distress.   Neck/lymph - supple, no significant adenopathy  Chest/CV - clear to auscultation, no wheezes, rales or rhonchi, symmetric air entry  Heart - normal rate, regular rhythm, normal S1, S2, no murmurs, rubs, clicks or gallops  Abdomen/GI - soft, nontender, nondistended, no masses or organomegaly   Musculoskeletal - no joint tenderness, deformity or swelling  Extremities - peripheral pulses normal, no pedal edema, no clubbing or cyanosis  Skin - normal coloration and turgor, no rashes, no suspicious skin lesions noted    Current Facility-Administered Medications   Medication Dose Route Frequency    sodium chloride flush 0.9 % injection 5-40 mL  5-40 mL IntraVENous 2 times per day    sodium chloride flush 0.9 % injection 5-40 mL  5-40 mL IntraVENous PRN    0.9 % sodium chloride infusion   IntraVENous PRN    acetaminophen (TYLENOL) tablet 650 mg  650 mg Oral Q4H PRN    aspirin EC tablet 81 mg  81 mg Oral Daily    clopidogrel (PLAVIX) tablet 75 mg  75 mg Oral  Daily    amLODIPine (NORVASC) tablet 10 mg  10 mg Oral Daily    atorvastatin (LIPITOR) tablet 40 mg  40 mg Oral Nightly    cholestyramine light packet 4 g  4 g Oral Daily    losartan (COZAAR) tablet 100 mg  100 mg Oral Daily    metoprolol succinate (TOPROL XL) extended release tablet 50 mg  50 mg Oral Daily    thyroid (ARMOUR) tablet 30 mg  30 mg Oral Daily    apixaban (ELIQUIS) tablet 5 mg  5 mg Oral BID    insulin lispro (HUMALOG,ADMELOG) injection vial 0-4 Units  0-4 Units SubCUTAneous TID WC    insulin lispro (HUMALOG,ADMELOG) injection vial 0-4 Units  0-4 Units SubCUTAneous Nightly    melatonin tablet 3 mg  3 mg Oral Nightly PRN       Data Review: data included in this note has been independently reviewed by the author     TELEMETRY: KRISTINE    Assessment/Plan:     Patient Active Problem List   Diagnosis    Anxiety    Post-traumatic headache    Age-related nuclear cataract of both eyes    Statin intolerance    Symptomatic bradycardia    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 ([facility])    Depression, major, single episode, moderate ([facility])    Mixed hyperlipidemia    Dry eye syndrome, bilateral    Pacemaker    Sjogren's syndrome ([facility])    Primary hypothyroidism    IBS (irritable bowel syndrome)    Hypertension, uncontrolled    Acute midline low back pain without sciatica    Allergic blepharitis, left    Angular blepharitis of both eyes    Myopia of both eyes with regular astigmatism and presbyopia    Posterior vitreous detachment, both eyes    Paroxysmal atrial fibrillation ([facility])    GABRIEL (obstructive sleep apnea)    Persistent disorder of initiating or maintaining sleep    Hypersomnolence    Chronic renal disease, stage III ([facility]) [487368]    Angina pectoris, unspecified    Atherosclerotic heart disease of native coronary artery with unspecified angina pectoris    Leg cramping    Type 2 diabetes mellitus with hyperglycemia    Mild cognitive impairment    History of CVA (cerebrovascular accident)    Encephalomalacia

## 2024-09-27 NOTE — PLAN OF CARE
Problem: Chronic Conditions and Co-morbidities  Goal: Patient's chronic conditions and co-morbidity symptoms are monitored and maintained or improved  Outcome: Progressing  Flowsheets (Taken 9/27/2024 0007)  Care Plan - Patient's Chronic Conditions and Co-Morbidity Symptoms are Monitored and Maintained or Improved:   Monitor and assess patient's chronic conditions and comorbid symptoms for stability, deterioration, or improvement   Collaborate with multidisciplinary team to address chronic and comorbid conditions and prevent exacerbation or deterioration   Update acute care plan with appropriate goals if chronic or comorbid symptoms are exacerbated and prevent overall improvement and discharge

## 2024-09-27 NOTE — DISCHARGE INSTRUCTIONS
Learning About Coronary Angioplasty  What is a coronary angioplasty?     Coronary angioplasty is a procedure that uses a thin tube called a catheter to open a blocked or narrowed coronary artery. Coronary arteries are the blood vessels that bring oxygen to the heart muscle. Angioplasty also may be called percutaneous coronary intervention (PCI).  Angioplasty can widen an artery that has been narrowed by fatty buildup (plaque) or blocked by a blood clot. The procedure helps blood flow more normally to the heart muscle.  How is the procedure done?  Coronary angioplasty is done in a cardiac catheterization laboratory (\"cath lab\"). It is done by a heart specialist called a cardiologist. The whole procedure may take 1½ to 3 hours.  You lie on a table under a large X-ray machine. You will get medicine through an I.V. in one of your veins. It helps you relax and not feel pain. You will be awake during the procedure. But you may not be able to remember much about it.  The doctor will inject some medicine into your wrist or groin to numb the skin. You will feel a small needle poke you. It's like having a blood test. You may feel some pressure when the doctor puts in the catheter. But you will not feel pain.  The doctor will look at X-ray pictures on a monitor (like a TV screen) to move the catheter to your heart. The doctor then puts a dye into the catheter. This makes your heart's arteries show up on a screen. The doctor can then see any arteries that are blocked or narrowed. You may feel warm or flushed for a short time when the doctor injects dye into your artery.  If you have a blocked or narrow artery, the doctor uses a catheter with a tiny balloon at the tip. The doctor puts it into the blocked or narrow area and inflates it. The balloon presses the fatty buildup against the walls of the artery. This buildup is called plaque. This creates more room for blood to flow. In most cases, the doctor then puts a stent in

## 2024-09-30 ENCOUNTER — CARE COORDINATION (OUTPATIENT)
Dept: CARE COORDINATION | Facility: CLINIC | Age: 74
End: 2024-09-30

## 2024-09-30 NOTE — CARE COORDINATION
Care Transitions Note    Initial Call - Call within 2 business days of discharge: Yes    Patient Current Location:  Home: [address] [place] [id number]    Care Transition Nurse contacted the patient by telephone to perform post hospital discharge assessment, verified name and  as identifiers. Provided introduction to self, and explanation of the Care Transition Nurse role.     Patient: Constance Botello    Patient : 1950   MRN: [id number]    Reason for Admission: OBS/Chest pain  Procedure: heart cath  Discharge Date: 24  RURS: No data recorded    Last Discharge Facility       Date Complaint Diagnosis Description Type Department Provider    24  CAD S/P percutaneous coronary angioplasty ... Admission (Discharged) [id number] Curran MD            Was this an external facility discharge? No    Additional needs identified to be addressed with provider   No needs identified             Method of communication with provider: none.    Patients top risk factors for readmission: medical condition-HTN,DM    Interventions to address risk factors:   Patient reports doing fine at time of call.  Reviewed her hospital disposition: Patient verbalized understanding  The patient is being discharged home in stable condition on a low saturated fat, low cholesterol and low salt diet. The patient is instructed to advance activities as tolerated to the limit of fatigue or shortness of breath. The patient is instructed to avoid all heavy lifting for 5 days. The patient is instructed to watch the cath site for bleeding/oozing; if seen, the patient is instructed to apply firm pressure with a clean cloth and call [facility] Cardiology at [phone]. The patient is instructed to watch for signs of infection which include: increasing area of redness, fever/hot to touch or purulent drainage at the catheterization site. The patient is instructed not to soak in a bathtub for 7-10 days, but is cleared to shower. The

## 2024-10-02 ENCOUNTER — TELEPHONE (OUTPATIENT)
Dept: CARDIAC REHAB | Age: 74
End: 2024-10-02

## 2024-10-02 NOTE — TELEPHONE ENCOUNTER
Inpatient referral to the [facility] Cardiac Rehab program received for this patient from the [facility] Post Cath Recovery Unit. Pt called with VM left requesting pt return call regarding MD referral to Cardiac Rehab with contact number provided.

## 2024-10-07 ENCOUNTER — CARE COORDINATION (OUTPATIENT)
Dept: CARE COORDINATION | Facility: CLINIC | Age: 74
End: 2024-10-07

## 2024-10-07 NOTE — CARE COORDINATION
in 6-10 days based on severity of symptoms and risk factors.  Plan for next call: symptom management    Amy Little LPN

## 2024-10-14 ENCOUNTER — CARE COORDINATION (OUTPATIENT)
Dept: CARE COORDINATION | Facility: CLINIC | Age: 74
End: 2024-10-14

## 2024-10-14 ASSESSMENT — ENCOUNTER SYMPTOMS
SHORTNESS OF BREATH: 0
CONSTIPATION: 0
ABDOMINAL DISTENTION: 0
DIARRHEA: 0
ABDOMINAL PAIN: 0
PHOTOPHOBIA: 0
SORE THROAT: 0
COUGH: 0

## 2024-10-14 NOTE — CARE COORDINATION
Care Transitions Note    Follow Up Call     Patient Current Location:  Home: [address]  [facility] [address]    Care Transition Nurse contacted the patient by telephone. Verified name and  as identifiers.    Additional needs identified to be addressed with provider   No needs identified                 Method of communication with provider: none.      Plan of care updates since last contact:  Patient reports doing fine with no issues at time of call. BP-138/89 BS-81 and weight 124.   Patient to contact Cardiac Rehab to schedule appointment when she decides if she would like to attend. CTN provided her with contact information.  Continue taking all medications as ordered  Continue with adequate nutritional and fluid intake  Patient to contact CTN with any issues, questions, or concerns prior to next outreach.    Advance Care Planning:   Does patient have an Advance Directive: reviewed during previous call, see note. .    Medication Review:  No changes since last call.     Remote Patient Monitoring:  Offered patient enrollment in the Remote Patient Monitoring (RPM) program for in-home monitoring: Yes, but did not enroll at this time: already monitoring with home equipment.    Assessments:  Care Transitions Subsequent and Final Call    Subsequent and Final Calls  Are you currently active with any services?: Outpatient/Community Services  Care Transitions Interventions  Other Interventions:             Goals Addressed    None          Follow Up Appointment:   Reviewed upcoming appointment(s).  Future Appointments         Provider Specialty Dept Phone    10/16/2024 10:15 AM Jono Pedroza MD Cardiology [phone]    2024 2:40 PM Sebas Hameed, APRN - CNP Sleep Medicine [phone]    2024 2:00 PM Janet Calderon MD Internal Medicine [phone]    3/10/2025 1:00 PM Jono Pedroza MD Cardiology [phone]            Care Transition Nurse provided contact information.  Plan

## 2024-10-14 NOTE — PROGRESS NOTES
rotation   Normal ST and T waves  Consider left atrial enlargement              ASSESSMENT and PLAN    Diagnoses and all orders for this visit:      1. Primary hypertension-continue current meds but add Aldactazide.  If starts having cramping, take one half Aldactazide daily.  Check daily blood pressure and heart rate log.  Check BMP and magnesium in 7 to 10 days.  Follow-up.  Renal artery duplex in the past couple of years has been negative.      2. Diabetes mellitus, with long-term current use of insulin (HCC) stable, per primary care physician      3. Hyperlipidemia associated with type 2 diabetes mellitus (HCC)-severely elevated LDL - intolerant to statins, ended up in the ER 3x on statin in the past with severe muscle weakness and myalgias.    On colestipol and lipid still markedly elevated as noted above.  Continue Repatha and recheck lipid and liver in 8 to 12 weeks.      4. Primary hypothyroidism-continue surveillance per PCP      5. Symptomatic bradycardia-stable, resolved after Medtronic dual-chamber pacer implant.  Interrogation and site look good today.  Stay well-hydrated but minimize sodium.    6.  Paroxysmal atrial fibrillation-monitor pacemaker, continue Eliquis. Rapid A-fib occurring only 0.5% of the time but clinically asymptomatic.  Continue beta-blocker as currently taking.  Emphasized taking this first thing in the morning each day.    7.  Vertebral artery stenosis-avoid hypotension, intolerant to all statins and refuses, continue Eliquis.  Per [facility].     8.  Chest discomfort-in the setting of uncontrolled hypertension with negative troponins.  Clinically euvolemic.  No interval symptoms.  Abnormal Lexiscan nuclear stress test with large area of lateral ischemia.   on no therapy, see above.  Continue Repatha and recheck lipid and liver in 8 to 12 weeks.    9 history of coronary stent-MANISH to critical LAD and circumflex stenoses.  On Plavix and Eliquis.  Tolerating well.

## 2024-10-16 ENCOUNTER — OFFICE VISIT (OUTPATIENT)
Age: 74
End: 2024-10-16
Payer: MEDICARE

## 2024-10-16 VITALS
BODY MASS INDEX: 24.19 KG/M2 | DIASTOLIC BLOOD PRESSURE: 64 MMHG | HEART RATE: 91 BPM | WEIGHT: 128 LBS | SYSTOLIC BLOOD PRESSURE: 118 MMHG

## 2024-10-16 DIAGNOSIS — E78.5 DYSLIPIDEMIA: ICD-10-CM

## 2024-10-16 DIAGNOSIS — Z95.0 PACEMAKER: ICD-10-CM

## 2024-10-16 DIAGNOSIS — I10 HYPERTENSION, UNCONTROLLED: ICD-10-CM

## 2024-10-16 DIAGNOSIS — I48.0 PAROXYSMAL ATRIAL FIBRILLATION (HCC): ICD-10-CM

## 2024-10-16 DIAGNOSIS — I25.10 CAD S/P PERCUTANEOUS CORONARY ANGIOPLASTY: Primary | ICD-10-CM

## 2024-10-16 DIAGNOSIS — G47.33 OSA (OBSTRUCTIVE SLEEP APNEA): ICD-10-CM

## 2024-10-16 DIAGNOSIS — Z98.61 CAD S/P PERCUTANEOUS CORONARY ANGIOPLASTY: Primary | ICD-10-CM

## 2024-10-16 PROCEDURE — 99214 OFFICE O/P EST MOD 30 MIN: CPT | Performed by: INTERNAL MEDICINE

## 2024-10-16 PROCEDURE — 3074F SYST BP LT 130 MM HG: CPT | Performed by: INTERNAL MEDICINE

## 2024-10-16 PROCEDURE — 1090F PRES/ABSN URINE INCON ASSESS: CPT | Performed by: INTERNAL MEDICINE

## 2024-10-16 PROCEDURE — 3017F COLORECTAL CA SCREEN DOC REV: CPT | Performed by: INTERNAL MEDICINE

## 2024-10-16 PROCEDURE — G8427 DOCREV CUR MEDS BY ELIG CLIN: HCPCS | Performed by: INTERNAL MEDICINE

## 2024-10-16 PROCEDURE — 1036F TOBACCO NON-USER: CPT | Performed by: INTERNAL MEDICINE

## 2024-10-16 PROCEDURE — G8484 FLU IMMUNIZE NO ADMIN: HCPCS | Performed by: INTERNAL MEDICINE

## 2024-10-16 PROCEDURE — G8399 PT W/DXA RESULTS DOCUMENT: HCPCS | Performed by: INTERNAL MEDICINE

## 2024-10-16 PROCEDURE — 3078F DIAST BP <80 MM HG: CPT | Performed by: INTERNAL MEDICINE

## 2024-10-16 PROCEDURE — 1123F ACP DISCUSS/DSCN MKR DOCD: CPT | Performed by: INTERNAL MEDICINE

## 2024-10-16 PROCEDURE — 93000 ELECTROCARDIOGRAM COMPLETE: CPT | Performed by: INTERNAL MEDICINE

## 2024-10-16 PROCEDURE — G8420 CALC BMI NORM PARAMETERS: HCPCS | Performed by: INTERNAL MEDICINE

## 2024-10-16 ASSESSMENT — ENCOUNTER SYMPTOMS: CHEST TIGHTNESS: 0

## 2024-10-21 ENCOUNTER — CARE COORDINATION (OUTPATIENT)
Dept: CARE COORDINATION | Facility: CLINIC | Age: 74
End: 2024-10-21

## 2024-10-21 NOTE — CARE COORDINATION
Care Transitions Note    Follow Up Call     Attempted to reach patient for transitions of care follow up.  Unable to reach patient.      Outreach Attempts:   HIPAA compliant voicemail left for patient.       Follow Up Appointment:   Future Appointments         Provider Specialty Dept Phone    11/21/2024 2:40 PM Sebas Hameed, APRN - CNP Sleep Medicine [phone]    12/16/2024 2:00 PM Janet Calderon MD Internal Medicine [phone]    3/10/2025 1:00 PM Jono Pedroza MD Cardiology [phone]    4/21/2025 1:00 PM Jono Pedroza MD Cardiology [phone]            Plan for follow-up call in 6-10 days based on severity of symptoms and risk factors. Plan for next call: symptom management    Amy Little LPN

## 2024-10-24 ENCOUNTER — TELEPHONE (OUTPATIENT)
Age: 74
End: 2024-10-24

## 2024-10-24 NOTE — TELEPHONE ENCOUNTER
Dere with [facility] called in to get verbal approval to replace the PT's Repatha. Ref# [id number]

## 2024-10-30 ENCOUNTER — CARE COORDINATION (OUTPATIENT)
Dept: CARE COORDINATION | Facility: CLINIC | Age: 74
End: 2024-10-30

## 2024-10-30 NOTE — CARE COORDINATION
Care Transitions Note    Final Call     Patient Current Location:  Home: [place] [address]    N Care Coordinator contacted the patient by telephone. Verified name and  as identifiers.    Patient graduated from the Care Transitions program on 10/30/24.  Patient/family has the ability to self manage at this time..      Advance Care Planning:   Does patient have an Advance Directive: reviewed during previous call, see note. .    Handoff:   Patient was not referred to the AC team due to patient declined services.      Care Summary Note: Patient reports doing very well with  no questions or concerns voiced.  She verified having this LPN CC's phone number should any questions arise.    Assessments:   Goals Addressed                   This Visit's Progress     COMPLETED: Returns to baseline activity level.   On track     Patient/Family able to obtain medicine after d/c     Patient/Family able to verbalize medicine changes     Patient/Family aware and attends follow up appointments s/p d/c.     Patient/Family agrees to notify provider of any barriers to plan of care.    Patient/Family agrees to notify provider of any symptoms that indicate a worsening of condition    Patient/Family agrees to monitor and record weights daily and report a gain of 2 lbs in a day or 5 lbs in a week to MD for review.    Patient/Family agrees to monitor and record BS and BP daily for MD review.                Upcoming Appointments:    Future Appointments         Provider Specialty Dept Phone    2024 2:40 PM Sebas Hameed, APRN - CNP Sleep Medicine [phone]    2024 2:00 PM Janet Calderon MD Internal Medicine [phone]    3/10/2025 1:00 PM Jono Pedroza MD Cardiology [phone]    2025 1:00 PM Jono Pedroza MD Cardiology [phone]            Patient has agreed to contact primary care provider and/or specialist for any further questions, concerns, or needs.    Amy

## 2024-11-20 ENCOUNTER — TELEPHONE (OUTPATIENT)
Dept: SLEEP MEDICINE | Age: 74
End: 2024-11-20

## 2024-11-20 NOTE — TELEPHONE ENCOUNTER
[facility] Pt has not gotten oral [facility]. Appt tomorrow will be cancelled and pt will call back to reschedule once she gets oral device.

## 2024-12-17 DIAGNOSIS — I10 HYPERTENSION, UNCONTROLLED: ICD-10-CM

## 2024-12-17 RX ORDER — LOSARTAN POTASSIUM 100 MG/1
100 TABLET ORAL DAILY
Qty: 90 TABLET | Refills: 0 | Status: SHIPPED | OUTPATIENT
Start: 2024-12-17

## 2024-12-17 NOTE — TELEPHONE ENCOUNTER
Patient last seen on 6/17/24, no follow-up scheduled at this time. Rx last wrote on 10/23/23. Rx last wrote on 10/23/23 #90 with 3 refill. Rx pended.

## 2024-12-17 NOTE — TELEPHONE ENCOUNTER
Patient's phone number wouldn't go through. Called patient's  and he will have patient call our office.

## 2025-01-09 DIAGNOSIS — E03.9 PRIMARY HYPOTHYROIDISM: ICD-10-CM

## 2025-01-10 RX ORDER — THYROID 30 MG/1
30 TABLET ORAL DAILY
Qty: 90 TABLET | Refills: 0 | Status: SHIPPED | OUTPATIENT
Start: 2025-01-10

## 2025-03-31 RX ORDER — DULOXETIN HYDROCHLORIDE 20 MG/1
20 CAPSULE, DELAYED RELEASE ORAL DAILY
Qty: 90 CAPSULE | Refills: 0 | OUTPATIENT
Start: 2025-03-31

## 2025-04-19 NOTE — PROGRESS NOTES
pacing  No AF  11.8 years battery    Past Medical History, Past Surgical History, Family history, Social History, and Medications were all reviewed with the patient today and updated as necessary.     Current Outpatient Medications   Medication Sig Dispense Refill    ARMOUR THYROID 30 MG tablet TAKE 1 TABLET DAILY 90 tablet 0    losartan (COZAAR) 100 MG tablet Take 1 tablet by mouth daily 90 tablet 0    clopidogrel (PLAVIX) 75 MG tablet Take 1 tablet by mouth daily 30 tablet 3    Evolocumab 140 MG/ML SOAJ Inject 140 mg into the skin every 14 days 2 Adjustable Dose Pre-filled Pen Syringe 11    amLODIPine (NORVASC) 10 MG tablet Take 1 tablet by mouth daily 90 tablet 3    Apoaequorin (PREVAGEN PO) Take by mouth      spironolactone-hydroCHLOROthiazide (ALDACTAZIDE) 25-25 MG per tablet Take 1 tablet by mouth daily 90 tablet 3    nitroGLYCERIN (NITROSTAT) 0.4 MG SL tablet Place 1 tablet under the tongue every 5 minutes as needed for Chest pain up to max of 3 total doses. If no relief after 3 doses, recheck at emergency department 25 tablet 3    omeprazole (PRILOSEC) 20 MG delayed release capsule Take 1 capsule by mouth 2 times daily (before meals) 30 capsule 0    insulin glargine (LANTUS;BASAGLAR) 100 UNIT/ML injection pen Inject 44 Units into the skin nightly 3 Adjustable Dose Pre-filled Pen Syringe 5    apixaban (ELIQUIS) 5 MG TABS tablet Take 1 tablet by mouth 2 times daily 180 tablet 3    NONFORMULARY Take 1 tablet by mouth daily as needed (hypertension) Healthy Blood Pressure Support- Naticol 1000 marine collagen      Coenzyme Q10 (COQ10) 100 MG CAPS Take 1 capsule by mouth daily      metoprolol succinate (TOPROL XL) 50 MG extended release tablet Take 1 tablet by mouth daily 90 tablet 3    atorvastatin (LIPITOR) 10 MG tablet Take 1 tablet by mouth at bedtime 90 tablet 1    Misc Natural Products (NEURIVA) CAPS Take 1 each by mouth daily 1 capsule 0    Flaxseed, Linseed, (FLAXSEED OIL PO) Take 1 tablet by mouth daily

## 2025-04-21 ENCOUNTER — CLINICAL SUPPORT (OUTPATIENT)
Age: 75
End: 2025-04-21
Payer: MEDICARE

## 2025-04-21 ENCOUNTER — OFFICE VISIT (OUTPATIENT)
Age: 75
End: 2025-04-21
Payer: MEDICARE

## 2025-04-21 VITALS
HEART RATE: 71 BPM | DIASTOLIC BLOOD PRESSURE: 70 MMHG | HEIGHT: 61 IN | WEIGHT: 128 LBS | BODY MASS INDEX: 24.17 KG/M2 | SYSTOLIC BLOOD PRESSURE: 136 MMHG

## 2025-04-21 DIAGNOSIS — I25.10 CAD S/P PERCUTANEOUS CORONARY ANGIOPLASTY: Primary | ICD-10-CM

## 2025-04-21 DIAGNOSIS — Z98.61 CAD S/P PERCUTANEOUS CORONARY ANGIOPLASTY: Primary | ICD-10-CM

## 2025-04-21 DIAGNOSIS — Z95.0 PACEMAKER: ICD-10-CM

## 2025-04-21 DIAGNOSIS — R00.1 SYMPTOMATIC BRADYCARDIA: ICD-10-CM

## 2025-04-21 DIAGNOSIS — E78.2 MIXED HYPERLIPIDEMIA: ICD-10-CM

## 2025-04-21 DIAGNOSIS — Z86.73 HISTORY OF CVA (CEREBROVASCULAR ACCIDENT): ICD-10-CM

## 2025-04-21 DIAGNOSIS — R00.1 SYMPTOMATIC BRADYCARDIA: Primary | ICD-10-CM

## 2025-04-21 DIAGNOSIS — I48.0 PAROXYSMAL ATRIAL FIBRILLATION (HCC): ICD-10-CM

## 2025-04-21 PROCEDURE — G8427 DOCREV CUR MEDS BY ELIG CLIN: HCPCS | Performed by: INTERNAL MEDICINE

## 2025-04-21 PROCEDURE — G8399 PT W/DXA RESULTS DOCUMENT: HCPCS | Performed by: INTERNAL MEDICINE

## 2025-04-21 PROCEDURE — 1123F ACP DISCUSS/DSCN MKR DOCD: CPT | Performed by: INTERNAL MEDICINE

## 2025-04-21 PROCEDURE — 1126F AMNT PAIN NOTED NONE PRSNT: CPT | Performed by: INTERNAL MEDICINE

## 2025-04-21 PROCEDURE — 93280 PM DEVICE PROGR EVAL DUAL: CPT | Performed by: INTERNAL MEDICINE

## 2025-04-21 PROCEDURE — 3078F DIAST BP <80 MM HG: CPT | Performed by: INTERNAL MEDICINE

## 2025-04-21 PROCEDURE — 99214 OFFICE O/P EST MOD 30 MIN: CPT | Performed by: INTERNAL MEDICINE

## 2025-04-21 PROCEDURE — G8420 CALC BMI NORM PARAMETERS: HCPCS | Performed by: INTERNAL MEDICINE

## 2025-04-21 PROCEDURE — 1160F RVW MEDS BY RX/DR IN RCRD: CPT | Performed by: INTERNAL MEDICINE

## 2025-04-21 PROCEDURE — 1159F MED LIST DOCD IN RCRD: CPT | Performed by: INTERNAL MEDICINE

## 2025-04-21 PROCEDURE — 3075F SYST BP GE 130 - 139MM HG: CPT | Performed by: INTERNAL MEDICINE

## 2025-04-21 PROCEDURE — 1036F TOBACCO NON-USER: CPT | Performed by: INTERNAL MEDICINE

## 2025-04-21 PROCEDURE — 1090F PRES/ABSN URINE INCON ASSESS: CPT | Performed by: INTERNAL MEDICINE

## 2025-04-21 PROCEDURE — 3017F COLORECTAL CA SCREEN DOC REV: CPT | Performed by: INTERNAL MEDICINE

## 2025-08-25 ENCOUNTER — APPOINTMENT (OUTPATIENT)
Dept: GENERAL RADIOLOGY | Age: 75
End: 2025-08-25
Payer: MEDICARE

## 2025-08-25 ENCOUNTER — HOSPITAL ENCOUNTER (EMERGENCY)
Age: 75
Discharge: HOME | End: 2025-08-25
Payer: MEDICARE

## 2025-08-25 VITALS
RESPIRATION RATE: 17 BRPM | TEMPERATURE: 98.1 F | WEIGHT: 125 LBS | BODY MASS INDEX: 23.6 KG/M2 | DIASTOLIC BLOOD PRESSURE: 86 MMHG | OXYGEN SATURATION: 97 % | SYSTOLIC BLOOD PRESSURE: 184 MMHG | HEIGHT: 61 IN | HEART RATE: 74 BPM

## 2025-08-25 DIAGNOSIS — M25.512 ACUTE PAIN OF LEFT SHOULDER: Primary | ICD-10-CM

## 2025-08-25 PROCEDURE — 6360000002 HC RX W HCPCS: Performed by: PHYSICIAN ASSISTANT

## 2025-08-25 PROCEDURE — 73030 X-RAY EXAM OF SHOULDER: CPT

## 2025-08-25 PROCEDURE — 99283 EMERGENCY DEPT VISIT LOW MDM: CPT

## 2025-08-25 PROCEDURE — 96372 THER/PROPH/DIAG INJ SC/IM: CPT

## 2025-08-25 RX ORDER — KETOROLAC TROMETHAMINE 30 MG/ML
30 INJECTION, SOLUTION INTRAMUSCULAR; INTRAVENOUS
Status: COMPLETED | OUTPATIENT
Start: 2025-08-25 | End: 2025-08-25

## 2025-08-25 RX ADMIN — KETOROLAC TROMETHAMINE 30 MG: 30 INJECTION, SOLUTION INTRAMUSCULAR at 15:14

## 2025-08-25 ASSESSMENT — PAIN DESCRIPTION - LOCATION
LOCATION: SHOULDER
LOCATION: SHOULDER

## 2025-08-25 ASSESSMENT — PAIN DESCRIPTION - DESCRIPTORS: DESCRIPTORS: STABBING

## 2025-08-25 ASSESSMENT — PAIN - FUNCTIONAL ASSESSMENT
PAIN_FUNCTIONAL_ASSESSMENT: 0-10
PAIN_FUNCTIONAL_ASSESSMENT: 0-10

## 2025-08-25 ASSESSMENT — PAIN SCALES - GENERAL
PAINLEVEL_OUTOF10: 7
PAINLEVEL_OUTOF10: 7
PAINLEVEL_OUTOF10: 5

## 2025-08-25 ASSESSMENT — PAIN DESCRIPTION - ORIENTATION
ORIENTATION: LEFT
ORIENTATION: RIGHT

## 2025-08-27 ENCOUNTER — TELEPHONE (OUTPATIENT)
Age: 75
End: 2025-08-27

## (undated) DEVICE — CATHETER GUID AD 6FR L100CM COR PERIPH GRN NYL PTFE XB L 3

## (undated) DEVICE — CATH BLLN ANGIO 3.50X8MM NC EUPHORIA RX

## (undated) DEVICE — DRESSING POSTOP AG PRISMASEAL 3.5X6IN

## (undated) DEVICE — RUNTHROUGH NS EXTRA FLOPPY PTCA GUIDEWIRE: Brand: RUNTHROUGH

## (undated) DEVICE — SEALANT HEMOSTAT THROM TOP --

## (undated) DEVICE — SUTURE TICRON SZ 0 L36IN NONABSORBABLE BLU CV 300 L35MM 1 2 8886339361

## (undated) DEVICE — IMMOB SHLDR W/WAIST STRP M --

## (undated) DEVICE — CATHETER COR DIAG 4.0 5FR 110CM 2 SIDE H

## (undated) DEVICE — SUTURE VCRL + SZ 4-0 L27IN ABSRB UD PS-2 3/8 CIR REV CUT VCP426H

## (undated) DEVICE — CATHETER COR DIAG PIGTAILS PIG 145 CRV 5FR 110CM 6 SIDE H

## (undated) DEVICE — DERMABOND SKIN ADH 0.7ML -- DERMABOND ADVANCED 12/BX

## (undated) DEVICE — GLIDESHEATH SLENDER STAINLESS STEEL KIT: Brand: GLIDESHEATH SLENDER

## (undated) DEVICE — PLASMABLADE X PS210-030S-LIGHT 3.0SL: Brand: PLASMABLADE™ X

## (undated) DEVICE — SUTURE ABSORBABLE BRAIDED 2-0 CT-1 27 IN UD VICRYL J259H

## (undated) DEVICE — CATH BLLN ANGIO 2.50X15MM SC EUPHORA RX

## (undated) DEVICE — BAND COMPR L21CM SHT CLR PLAS HEMSTAT EXT HK AND LOOP RETEN

## (undated) DEVICE — COPILOT BLEEDBACK CONTROL VALVE: Brand: COPILOT

## (undated) DEVICE — INTRO PEELWY HEMVLV 7F 13CM -- SHRT PRELUDE SNAP

## (undated) DEVICE — CATH BLLN ANGIO 2.75X12MM NC EUPHORIA RX

## (undated) DEVICE — KENDALL RADIOLUCENT FOAM MONITORING ELECTRODE RECTANGULAR SHAPE: Brand: KENDALL

## (undated) DEVICE — SUTURE VCRL SZ 3-0 L27IN ABSRB UD L26MM SH 1/2 CIR J416H

## (undated) DEVICE — GUIDEWIRE 035IN 210CM PTFE COAT FIX COR J TIP 15MM FIRM BODY

## (undated) DEVICE — CATHETER DIAG 5FR L100CM LUMN ID0.047IN JL3.5 CRV 0 SIDE H

## (undated) DEVICE — CONNECTOR TBNG OD5-7MM O2 END DISP

## (undated) DEVICE — CANNULA NSL ORAL AD FOR CAPNOFLEX CO2 O2 AIRLFE